# Patient Record
Sex: FEMALE | Race: WHITE | NOT HISPANIC OR LATINO | Employment: FULL TIME | URBAN - METROPOLITAN AREA
[De-identification: names, ages, dates, MRNs, and addresses within clinical notes are randomized per-mention and may not be internally consistent; named-entity substitution may affect disease eponyms.]

---

## 2017-01-12 ENCOUNTER — COMMUNICATION - HEALTHEAST (OUTPATIENT)
Dept: INTERNAL MEDICINE | Facility: CLINIC | Age: 52
End: 2017-01-12

## 2017-01-17 ENCOUNTER — COMMUNICATION - HEALTHEAST (OUTPATIENT)
Dept: INTERNAL MEDICINE | Facility: CLINIC | Age: 52
End: 2017-01-17

## 2017-01-24 ENCOUNTER — OFFICE VISIT - HEALTHEAST (OUTPATIENT)
Dept: PODIATRY | Age: 52
End: 2017-01-24

## 2017-01-24 DIAGNOSIS — L57.0 KERATOMA: ICD-10-CM

## 2017-01-25 ENCOUNTER — COMMUNICATION - HEALTHEAST (OUTPATIENT)
Dept: INTERNAL MEDICINE | Facility: CLINIC | Age: 52
End: 2017-01-25

## 2017-01-25 DIAGNOSIS — Z13.9 SCREENING: ICD-10-CM

## 2017-01-26 ENCOUNTER — COMMUNICATION - HEALTHEAST (OUTPATIENT)
Dept: INTERNAL MEDICINE | Facility: CLINIC | Age: 52
End: 2017-01-26

## 2017-01-27 ENCOUNTER — RECORDS - HEALTHEAST (OUTPATIENT)
Dept: ADMINISTRATIVE | Facility: OTHER | Age: 52
End: 2017-01-27

## 2017-02-08 ENCOUNTER — OFFICE VISIT - HEALTHEAST (OUTPATIENT)
Dept: INTERNAL MEDICINE | Facility: CLINIC | Age: 52
End: 2017-02-08

## 2017-02-08 ENCOUNTER — COMMUNICATION - HEALTHEAST (OUTPATIENT)
Dept: INTERNAL MEDICINE | Facility: CLINIC | Age: 52
End: 2017-02-08

## 2017-02-08 DIAGNOSIS — M54.89 OTHER CHRONIC BACK PAIN: ICD-10-CM

## 2017-02-08 DIAGNOSIS — Z23 NEED FOR IMMUNIZATION AGAINST INFLUENZA: ICD-10-CM

## 2017-02-08 DIAGNOSIS — L71.9 ROSACEA: ICD-10-CM

## 2017-02-08 DIAGNOSIS — Z91.09 ENVIRONMENTAL ALLERGIES: ICD-10-CM

## 2017-02-08 DIAGNOSIS — G89.29 OTHER CHRONIC BACK PAIN: ICD-10-CM

## 2017-02-08 DIAGNOSIS — G62.9 NEUROPATHY: ICD-10-CM

## 2017-02-08 DIAGNOSIS — K80.20 CALCULUS OF GALLBLADDER WITHOUT CHOLECYSTITIS WITHOUT OBSTRUCTION: ICD-10-CM

## 2017-02-08 ASSESSMENT — MIFFLIN-ST. JEOR: SCORE: 1361.53

## 2017-02-10 ENCOUNTER — OFFICE VISIT - HEALTHEAST (OUTPATIENT)
Dept: ALLERGY | Facility: CLINIC | Age: 52
End: 2017-02-10

## 2017-02-10 DIAGNOSIS — R20.0 NUMBNESS: ICD-10-CM

## 2017-02-10 DIAGNOSIS — T78.1XXD ADVERSE FOOD REACTION, SUBSEQUENT ENCOUNTER: ICD-10-CM

## 2017-02-10 DIAGNOSIS — L50.3 DERMATOGRAPHIC URTICARIA: ICD-10-CM

## 2017-02-10 ASSESSMENT — MIFFLIN-ST. JEOR: SCORE: 1344.52

## 2017-02-14 ENCOUNTER — COMMUNICATION - HEALTHEAST (OUTPATIENT)
Dept: INTERNAL MEDICINE | Facility: CLINIC | Age: 52
End: 2017-02-14

## 2017-02-14 ENCOUNTER — RECORDS - HEALTHEAST (OUTPATIENT)
Dept: ADMINISTRATIVE | Facility: OTHER | Age: 52
End: 2017-02-14

## 2017-02-14 ENCOUNTER — OFFICE VISIT (OUTPATIENT)
Dept: NEUROLOGY | Facility: CLINIC | Age: 52
End: 2017-02-14

## 2017-02-14 VITALS — SYSTOLIC BLOOD PRESSURE: 126 MMHG | DIASTOLIC BLOOD PRESSURE: 86 MMHG | HEART RATE: 66 BPM | RESPIRATION RATE: 18 BRPM

## 2017-02-14 DIAGNOSIS — R20.9 SENSORY DISTURBANCE: Primary | ICD-10-CM

## 2017-02-14 DIAGNOSIS — E53.8 VITAMIN B12 DEFICIENCY (NON ANEMIC): ICD-10-CM

## 2017-02-14 ASSESSMENT — PAIN SCALES - GENERAL: PAINLEVEL: MILD PAIN (2)

## 2017-02-14 NOTE — NURSING NOTE
Chief Complaint   Patient presents with     Consult     unexplained neuropathic process     Lanny Ivory,CMA

## 2017-02-14 NOTE — MR AVS SNAPSHOT
After Visit Summary   2/14/2017    Hill Odom    MRN: [id number]           Patient Information     Date Of Birth          1965        Visit Information        Provider Department      2/14/2017 1:30 PM Jace Henderson MD [facility]        Today's Diagnoses     Sensory disturbance    -  1    Vitamin B12 deficiency (non anemic)           Follow-ups after your visit        Additional Services     [facility] Referral       [facility]          Shaheed; ? Toxic reaction to insteps  [facility]  [address]  [address] [place], [place]  [id number] [phone]                  Your next 10 appointments already scheduled     Mar 14, 2017  8:15 AM CDT   Return Visit with Jace Henderson MD   [facility] ([facility])    [address], [address]  [place] [address] [phone]              Who to contact     Please call your clinic at [phone] to:    Ask questions about your health    Make or cancel appointments    Discuss your medicines    Learn about your test results    Speak to your doctor   If you have compliments or concerns about an experience at your clinic, or if you wish to file a complaint, please contact [facility] Patient Relations at [phone] or email us at [email or web address]         Additional Information About Your Visit        MyChart Information     [employer] gives you secure access to your electronic health record. If you see a primary care provider, you can also send messages to your care team and make appointments. If you have questions, please call your primary care clinic.  If you do not have a primary care provider, please call [phone] and they will assist you.      [employer] is an electronic gateway that provides easy, online access to your medical records.  With [employer], you can request a clinic appointment, read your test results, renew a prescription or communicate with your care team.     To access your existing account, please contact your [facility] or call [phone] for assistance.        Care EveryWhere ID     This is your Care EveryWhere ID. This could be used by other organizations to access your [place] medical records  [id number]        Your Vitals Were     Pulse Respirations                66 [phone]           Blood Pressure from Last 3 Encounters:   02/14/17 126/86    Weight from Last 3 Encounters:   No data found for Wt              We Performed the Following     Parietal Cell Antibody IgG        Primary Care Provider Office Phone # Fax #    Matt Sung [phone]       [facility] [address]  [place] [address]        Thank you!     Thank you for choosing [facility]  for your care. Our goal is always to provide you with excellent care. Hearing back from our patients is one way we can continue to improve our services. Please take a few minutes to complete the written survey that you may receive in the mail after your visit with us. Thank you!             Your Updated Medication List - Protect others around you: Learn how to safely use, store and throw away your medicines at www.disposemymeds.org.          This list is accurate as of: 2/14/17 11:59 PM.  Always use your most recent med list.                   Brand Name Dispense Instructions for use    SUMATRIPTAN SUCCINATE PO      Take 100 mg by mouth as needed for migraine

## 2017-02-14 NOTE — LETTER
2017       RE: Hill Odom   [address] [address]  [facility] [address]     Dear Colleague,    Thank you for referring your patient, Hill Odom, to the [facility] at [facility]. Please see a copy of my visit note below.    2017         Matt Sung MD   [facility]   [address]   [place]  [address]      RE: Hill Odom   MRN: [id number]   : 1965      Dear Matt:      Thank you for referring Hill Odom for neurologic consultation on 2017.  The patient is a 51-year-old woman you were kind enough to refer with chief complaints of a burning dysesthetic sensation.      The patient came with handwritten notes that I reviewed with her and I also took her history.  I was 30 minutes late and she was quite upset about that.  I apologized but I told her I really could not always keep my office schedule on time.  It suddenly turned out that she has a very complicated history and I ended up spending 70 minutes with her and then beyond that more minutes trying to arrange testing.  The patient did initially discuss with me diagnosis, and I did ask her to try to stay away from diagnosis and previous testing that was done until after I was able to get a history of her complaints.  She did tell me that she started to have problems in  or July.  She noted that she had a burning sensation involving her feet to the knees.  This evidently lasted about 10 days.  She also noted that she had extreme low back pain in July.  She can recall that she was reaching up to clear out some material in her garage and she evidently dropped a 20-pound box.  She said about 2 weeks later symptoms started.  She just recalled this historical event.  She also then noted a bump involving the right side of her head.  This lasted for about 6 weeks.  She said that she had a very small area that was not  discolored that actually broke and bled.  She could recall feeling this initially start when she was on a bike ride.  The patient did note that she had paresthesias that have continued in her feet.  These have been more persistent in a seated position.  She said the last 10 days she has actually had improvement in her feet as she took out memory foam that she had placed in her shoes and she is convinced that she had some type of toxic reaction to the memory foam.  She noted symptoms also were present when she would be seated on the floor with her knees flexed.  The patient has noted burning involving the back during this time at least 1-2 times per month and also to a lesser extent in her abdomen.  The patient did note that all of her sensory complaints have had a burning quality.  She has actually noted that if she sits and moves her head forward she does get what she described as the same sensation in her lower coccyx area.  She is strong.  She still has paresthesias involving the soles of her feet that go to the back of her calves.  The patient did note that she had pain involving the middle of her thoracic spine in July.  She said that she had also lower back pain.  She has never had decreased perirectal or genital sensation.  She did note that earlier she was quite fatigued until the last 2 weeks and that seems to have improved.  The patient did note that she has had urgency of bowel movement for years.  She said that she has actually gained about 10 pounds in the last 10 months.  She has never had any problems with imbalance.  She said her arms are free of symptoms.  She noted that she has been in excellent health.  Earlier she would exercise riding her bike.  When I reviewed some of her records in front of her I did note that she had been evaluated for a dysesthetic sensation in the left shoulder that lasted about 2 months in 2015.  She said that she had been carrying material over her shoulder but then  "switched to a backpack and that seemed to help.  Those symptoms never returned.  She also complained of some tingling in her toes.  She said in July she did receive prednisone for a week and also Medrol at suppertime.  This helped for about a week.  The patient went on to tell me there is no family history of demyelinating disease.  She herself has never had any history of optic neuritis.  Her symptoms do not seem to be heat related.  She is of [place]  ext[place].  She does not smoke.  She has never used illicit drugs.  She rarely has used alcohol.  The patient did review her family history.  She never really knew her father.  Her mother is fine.  The patient's sister has medical problems and is on disability.  She has 2 brothers and she is estranged from them.  They evidently had  drug and alcohol issues.  The patient denied any trouble with memory.  She has not had any history of allergies to medication.      Review of her typed-written notes indicated that the patient's symptoms started on 06/24/2016.  At that time, she felt something burst in her right shoulder blade.  She thought she had a \"gallbladder stone.\"  Later she had some bad diarrhea.  She had cleaned out the garage and dropped a heavy box.  She said she had no immediate symptoms as a result.  Then on 07/08 she noted paresthesias when she took a walk.  She had severe back pain and numbness when seated in a car on the way to [place].  Biking helped.  A pillow between her knees helped.  Then she had severe diarrhea.  On 07/18, the patient noted a burning sensation in her legs.  She said she had what she described as hot around the knees, it \"felt like fluid behind the knees.\"  The patient on 07/22 was given prednisone.  In August, she noticed the bug bite she described on the side of her head.  In September, she took meloxicam for 2 weeks and then had a burning sensation in her scalp.  The patient did take vitamin D and vitamin B12 in early in " "September.  In mid September, she had attacks on her stomach and back.  It started burning in the upper stomach and the back in the middle of the night.  On 10/15, she had severe diarrhea with bright green bowel movement.  In October, she had paresthesias from her knees to her feet, getting chills.  She had a pain level of 2-3 for 3-4 days with spikes of the pain to 10 for 2-3 days.  She had numbness in her legs when lying down but exacerbated when walking.  She said after 1-1/2 blocks she could hardly feel her feet.  The patient did have on 11/28 on the morning of endoscopy \"electric shocks\" to her lower back and lower abdomen area starting about every 30 seconds.  The patient subsequently did have a neurologic consultation and also had endocrinology, [facility] review, as well as gastroenterology consult and did have an endoscopy.  The patient did have multiple tests done.  She had what appears on records from 10/04/2016 from a neurologist a prior MRI scan of the lumbar spine that showed at L4-5 a small disk protrusion on the right, extrapyramidal zone with annular fissure.  This encroached on the right L4 nerve root.      The patient did have lab studies collated on that record from 09/28/2016, and these were normal for complete blood count, gliadin antibody studies, as well as tissue transglutaminase studies, TSH and IgA level.  Her vitamin D level was low at 28.3.      The patient did evidently have a CT angiogram of the head and neck which showed no mass or abnormality.  Abdominal ultrasound on 06/20/2015 showed she had a solitary mobile gallstones and no evidence of cholecystitis.      The patient's other records do indicate that she has had a cholelithiasis, history of anesthesia with \"slow to wake upon general anesthesia.\"  This evidently came after a partial hysterectomy was done for fibroids on 06/2012.  She has had a prior history of migraine headaches, postoperative nausea and vomiting, rosacea, " "eyelid surgery 12/2015 in 2 foot surgeries.      The other neurologist's records do indicate that she had epigastric pain that was worse at night.  This was from September to December.  She stopped milk then and her symptoms improved.      The patient had a prior history of migraines but not for 10 years and these are better.      The patient also saw a different neurologist, Dr. Linsey Diaz.  She did not recall that evaluation other than she said she was told by Dr. Diaz \"she did not do EMGs.\"  The patient did have a history of migraines the previous 10 years.  These are much better.      Other records from the [facility] did indicate that she had normal EMG study of the legs.  This study was evidently done in December after she had seen Dr. Whalen at the [facility].  He indicated when she was seen on 12/03/2015 that she had a tingling in her left shoulder on an intermittent basis for the last 3 months, and these episodes occurred about every other day for about 5 minutes.  She also reported tingling in her hands, feet and tongue the last couple of weeks.  He did recommend an MRI scan that was done of her brain and cervical cord for demyelinating lesions and this evidently was normal according to the patient.      The patient did have other tests done and these included an  LP. On 01/18/2016  results showed a negative IgG index, negative, HSV, negative cytology, negative Lyme CSF, equivocal oligoclonal band, protein of 32 and glucose of 48 and 2 white blood cells noted on CSF.      Neurologic examination revealed an anxious woman who initially was quite upset about my being late by 30 minutes.  Her blood pressure is 126/86 with a pulse of 66.  Gait, station, cerebellar testing, muscle stretch reflexes that were present at the biceps and brachioradialis, brisk at the triceps with knee jerks being slightly brisk and present with slightly reduced ankle jerks, left upper quadrant abdominal reflexes being present, " plantar stimulation with withdrawal, strength testing, cranial nerve examination, superficial and cortical sensory testing with no obvious sensory level being found.  She had normal vibratory and position sense testing.  She had negative Beevor sign.  I could not auscultate cervical bruits.  She had a supple neck.  The patient had regular cardiac rhythm without gallops or murmurs.      IMPRESSION:  Sensory disturbance.      The patient is convinced that much of her problem does relate to wearing insteps which could have some type of toxic effect on her.  I did tell her I did not know a  but did ask that she review the possibility of consultation with a  in [place] who also works at [facility].  I directed her to that office[[facility]].  The patient is going to have further studies now including MRI scan of the cervical and thoracic spine with and without gadolinium at [facility] on a 3 Cinthia machine.  Depending on the test results, I will make other recommendations.  She notes she is better over the last 10 days.  I told her I did not have a specific diagnosis but was concerned that her symptoms in her lower back were made worse by neck flexion and explained the significance of this.      A complete review of medical systems was done and a positive review is listed in the report above. Over 50% of the time involved counseling and coordination of care.     Sincerely yours,      Jace Henderson MD           D: 2017 15:22   T: 2017 09:43   MT: [email or web address]      Name:     SUZAN KENT   MRN:      [id number]        Account:      [id number]   :      1965           Service Date: 2017      Document: [id number]

## 2017-02-17 ENCOUNTER — OFFICE VISIT - HEALTHEAST (OUTPATIENT)
Dept: SURGERY | Facility: CLINIC | Age: 52
End: 2017-02-17

## 2017-02-17 DIAGNOSIS — K80.20 SYMPTOMATIC CHOLELITHIASIS: ICD-10-CM

## 2017-02-17 ASSESSMENT — MIFFLIN-ST. JEOR: SCORE: 1345.65

## 2017-02-20 DIAGNOSIS — R20.9 SENSORY DISTURBANCE: ICD-10-CM

## 2017-02-20 DIAGNOSIS — E53.8 VITAMIN B12 DEFICIENCY (NON ANEMIC): ICD-10-CM

## 2017-02-21 NOTE — PROGRESS NOTES
2017         Matt Sung MD   [facility]   [address]   [place]  [address]      RE: Hill Odom   MRN: [id number]   : 1965      Dear Matt:      Thank you for referring Hill Odom for neurologic consultation on 2017.  The patient is a 51-year-old woman you were kind enough to refer with chief complaints of a burning dysesthetic sensation.      The patient came with handwritten notes that I reviewed with her and I also took her history.  I was 30 minutes late and she was quite upset about that.  I apologized but I told her I really could not always keep my office schedule on time.  It suddenly turned out that she has a very complicated history and I ended up spending 70 minutes with her and then beyond that more minutes trying to arrange testing.  The patient did initially discuss with me diagnosis, and I did ask her to try to stay away from diagnosis and previous testing that was done until after I was able to get a history of her complaints.  She did tell me that she started to have problems in  or July.  She noted that she had a burning sensation involving her feet to the knees.  This evidently lasted about 10 days.  She also noted that she had extreme low back pain in July.  She can recall that she was reaching up to clear out some material in her garage and she evidently dropped a 20-pound box.  She said about 2 weeks later symptoms started.  She just recalled this historical event.  She also then noted a bump involving the right side of her head.  This lasted for about 6 weeks.  She said that she had a very small area that was not discolored that actually broke and bled.  She could recall feeling this initially start when she was on a bike ride.  The patient did note that she had paresthesias that have continued in her feet.  These have been more persistent in a seated position.  She said the last 10 days she has actually had  improvement in her feet as she took out memory foam that she had placed in her shoes and she is convinced that she had some type of toxic reaction to the memory foam.  She noted symptoms also were present when she would be seated on the floor with her knees flexed.  The patient has noted burning involving the back during this time at least 1-2 times per month and also to a lesser extent in her abdomen.  The patient did note that all of her sensory complaints have had a burning quality.  She has actually noted that if she sits and moves her head forward she does get what she described as the same sensation in her lower coccyx area.  She is strong.  She still has paresthesias involving the soles of her feet that go to the back of her calves.  The patient did note that she had pain involving the middle of her thoracic spine in July.  She said that she had also lower back pain.  She has never had decreased perirectal or genital sensation.  She did note that earlier she was quite fatigued until the last 2 weeks and that seems to have improved.  The patient did note that she has had urgency of bowel movement for years.  She said that she has actually gained about 10 pounds in the last 10 months.  She has never had any problems with imbalance.  She said her arms are free of symptoms.  She noted that she has been in excellent health.  Earlier she would exercise riding her bike.  When I reviewed some of her records in front of her I did note that she had been evaluated for a dysesthetic sensation in the left shoulder that lasted about 2 months in 2015.  She said that she had been carrying material over her shoulder but then switched to a backpack and that seemed to help.  Those symptoms never returned.  She also complained of some tingling in her toes.  She said in July she did receive prednisone for a week and also Medrol at suppertime.  This helped for about a week.  The patient went on to tell me there is no family  "history of demyelinating disease.  She herself has never had any history of optic neuritis.  Her symptoms do not seem to be heat related.  She is of northern  ext[place].  She does not smoke.  She has never used illicit drugs.  She rarely has used alcohol.  The patient did review her family history.  She never really knew her father.  Her mother is fine.  The patient's sister has medical problems and is on disability.  She has 2 brothers and she is estranged from them.  They evidently had  drug and alcohol issues.  The patient denied any trouble with memory.  She has not had any history of allergies to medication.      Review of her typed-written notes indicated that the patient's symptoms started on 06/24/2016.  At that time, she felt something burst in her right shoulder blade.  She thought she had a \"gallbladder stone.\"  Later she had some bad diarrhea.  She had cleaned out the garage and dropped a heavy box.  She said she had no immediate symptoms as a result.  Then on 07/08 she noted paresthesias when she took a walk.  She had severe back pain and numbness when seated in a car on the way to [place].  Biking helped.  A pillow between her knees helped.  Then she had severe diarrhea.  On 07/18, the patient noted a burning sensation in her legs.  She said she had what she described as hot around the knees, it \"felt like fluid behind the knees.\"  The patient on 07/22 was given prednisone.  In August, she noticed the bug bite she described on the side of her head.  In September, she took meloxicam for 2 weeks and then had a burning sensation in her scalp.  The patient did take vitamin D and vitamin B12 in early in September.  In mid September, she had attacks on her stomach and back.  It started burning in the upper stomach and the back in the middle of the night.  On 10/15, she had severe diarrhea with bright green bowel movement.  In October, she had paresthesias from her knees to her feet, getting chills. " " She had a pain level of 2-3 for 3-4 days with spikes of the pain to 10 for 2-3 days.  She had numbness in her legs when lying down but exacerbated when walking.  She said after 1-1/2 blocks she could hardly feel her feet.  The patient did have on 11/28 on the morning of endoscopy \"electric shocks\" to her lower back and lower abdomen area starting about every 30 seconds.  The patient subsequently did have a neurologic consultation and also had endocrinology, [facility] review, as well as gastroenterology consult and did have an endoscopy.  The patient did have multiple tests done.  She had what appears on records from 10/04/2016 from a neurologist a prior MRI scan of the lumbar spine that showed at L4-5 a small disk protrusion on the right, extrapyramidal zone with annular fissure.  This encroached on the right L4 nerve root.      The patient did have lab studies collated on that record from 09/28/2016, and these were normal for complete blood count, gliadin antibody studies, as well as tissue transglutaminase studies, TSH and IgA level.  Her vitamin D level was low at 28.3.      The patient did evidently have a CT angiogram of the head and neck which showed no mass or abnormality.  Abdominal ultrasound on 06/20/2015 showed she had a solitary mobile gallstones and no evidence of cholecystitis.      The patient's other records do indicate that she has had a cholelithiasis, history of anesthesia with \"slow to wake upon general anesthesia.\"  This evidently came after a partial hysterectomy was done for fibroids on 06/2012.  She has had a prior history of migraine headaches, postoperative nausea and vomiting, rosacea, eyelid surgery 12/2015 in 2 foot surgeries.      The other neurologist's records do indicate that she had epigastric pain that was worse at night.  This was from September to December.  She stopped milk then and her symptoms improved.      The patient had a prior history of migraines but not for 10 years " "and these are better.      The patient also saw a different neurologist, Dr. Linsey Diaz.  She did not recall that evaluation other than she said she was told by Dr. Diaz \"she did not do EMGs.\"  The patient did have a history of migraines the previous 10 years.  These are much better.      Other records from the [facility] did indicate that she had normal EMG study of the legs.  This study was evidently done in December after she had seen Dr. Whalen at the [facility].  He indicated when she was seen on 12/03/2015 that she had a tingling in her left shoulder on an intermittent basis for the last 3 months, and these episodes occurred about every other day for about 5 minutes.  She also reported tingling in her hands, feet and tongue the last couple of weeks.  He did recommend an MRI scan that was done of her brain and cervical cord for demyelinating lesions and this evidently was normal according to the patient.      The patient did have other tests done and these included an  LP. On 01/18/2016  results showed a negative IgG index, negative, HSV, negative cytology, negative Lyme CSF, equivocal oligoclonal band, protein of 32 and glucose of 48 and 2 white blood cells noted on CSF.      Neurologic examination revealed an anxious woman who initially was quite upset about my being late by 30 minutes.  Her blood pressure is 126/86 with a pulse of 66.  Gait, station, cerebellar testing, muscle stretch reflexes that were present at the biceps and brachioradialis, brisk at the triceps with knee jerks being slightly brisk and present with slightly reduced ankle jerks, left upper quadrant abdominal reflexes being present, plantar stimulation with withdrawal, strength testing, cranial nerve examination, superficial and cortical sensory testing with no obvious sensory level being found.  She had normal vibratory and position sense testing.  She had negative Beevor sign.  I could not auscultate cervical bruits.  She had a supple " neck.  The patient had regular cardiac rhythm without gallops or murmurs.      IMPRESSION:  Sensory disturbance.      The patient is convinced that much of her problem does relate to wearing insteps which could have some type of toxic effect on her.  I did tell her I did not know a  but did ask that she review the possibility of consultation with a  in [place] who also works at [facility].  I directed her to that office[[facility]].  The patient is going to have further studies now including MRI scan of the cervical and thoracic spine with and without gadolinium at [facility] on a 3 Cinthia machine.  Depending on the test results, I will make other recommendations.  She notes she is better over the last 10 days.  I told her I did not have a specific diagnosis but was concerned that her symptoms in her lower back were made worse by neck flexion and explained the significance of this.      A complete review of medical systems was done and a positive review is listed in the report above. Over 50% of the time involved counseling and coordination of care.     Sincerely yours,      MD LYNNE Schwab MD             D: 2017 15:22   T: 2017 09:43   MT: [email or web address]      Name:     SUZAN KENT   MRN:      [id number]        Account:      [id number]   :      1965           Service Date: 2017      Document: [id number]

## 2017-02-23 ENCOUNTER — TELEPHONE (OUTPATIENT)
Dept: NEUROLOGY | Facility: CLINIC | Age: 52
End: 2017-02-23

## 2017-02-23 DIAGNOSIS — R20.9 SENSORY DISTURBANCE: Primary | ICD-10-CM

## 2017-02-23 LAB
COPPER SERPL-MCNC: 109 UG/DL
ZINC SERPL-MCNC: 60 UG/ML

## 2017-02-27 LAB — PNP ABY SERUM: NORMAL

## 2017-02-28 ENCOUNTER — TELEPHONE (OUTPATIENT)
Dept: NEUROLOGY | Facility: CLINIC | Age: 52
End: 2017-02-28

## 2017-02-28 NOTE — TELEPHONE ENCOUNTER
----- Message from Jace Henderson MD sent at 2/28/2017 12:44 PM CST -----  Please tell her labs I ordered are negative or normal[good news]

## 2017-03-02 ENCOUNTER — ALLIED HEALTH/NURSE VISIT (OUTPATIENT)
Dept: NEUROLOGY | Facility: CLINIC | Age: 52
End: 2017-03-02

## 2017-03-02 DIAGNOSIS — M47.812 CERVICAL SPONDYLOSIS WITHOUT MYELOPATHY: ICD-10-CM

## 2017-03-02 DIAGNOSIS — R20.2 PARESTHESIA OF BOTH LOWER EXTREMITIES: Primary | ICD-10-CM

## 2017-03-02 DIAGNOSIS — E53.8 VITAMIN B12 DEFICIENCY (NON ANEMIC): ICD-10-CM

## 2017-03-02 DIAGNOSIS — R20.9 SENSORY DISTURBANCE: ICD-10-CM

## 2017-03-02 NOTE — MR AVS SNAPSHOT
After Visit Summary   3/2/2017    Hill Odom    MRN: [id number]           Patient Information     Date Of Birth          1965        Visit Information        Provider Department      3/2/2017 10:30 AM  EEG TECH 1 [facility]        Today's Diagnoses     Paresthesia of both lower extremities    -  1    Cervical spondylosis without myelopathy        Vitamin B12 deficiency (non anemic)           Follow-ups after your visit        Your next 10 appointments already scheduled     Mar 02, 2017  6:15 PM CST   (Arrive by 6:00 PM)   MR BRAIN W/O & W CONTRAST with [id number]   [facility] MRI ([facility])    [address] [address] [phone]           Take your medicines as usual, unless your doctor tells you not to. Bring a list of your current medicines to your exam (including vitamins, minerals and over-the-counter drugs).  You will be given intravenous contrast for this exam. To prepare:   The day before your exam, drink extra fluids at least six 8-ounce glasses (unless your doctor tells you to restrict your fluids).   Have a blood test (creatinine test) within 30 days of your exam. Go to your clinic or Diagnostic Imaging Department for this test.  The MRI machine uses a strong magnet. Please wear clothes without metal (snaps, zippers). A sweatsuit works well, or we may give you a hospital gown.  Please remove any body piercings and hair extensions before you arrive. You will also remove watches, jewelry, hairpins, wallets, dentures, partial dental plates and hearing aids. You may wear contact lenses, and you may be able to wear your rings. We have a safe place to keep your personal items, but it is safer to leave them at home.   **IMPORTANT** THE INSTRUCTIONS BELOW ARE ONLY FOR THOSE PATIENTS WHO HAVE BEEN TOLD THEY WILL RECEIVE SEDATION OR GENERAL ANESTHESIA DURING THEIR MRI PROCEDURE:  IF YOU WILL RECEIVE SEDATION  (take medicine to help you relax during your exam):   You must get the medicine from your doctor before you arrive. Bring the medicine to the exam. Do not take it at home.   Arrive one hour early. Bring someone who can take you home after the test. Your medicine will make you sleepy. After the exam, you may not drive, take a bus or take a taxi by yourself.   No eating 8 hours before your exam. You may have clear liquids up until 4 hours before your exam. (Clear liquids include water, clear tea, black coffee and fruit juice without pulp.)  IF YOU WILL RECEIVE ANESTHESIA (be asleep for your exam):   Arrive 1 1/2 hours early. Bring someone who can take you home after the test. You may not drive, take a bus or take a taxi by yourself.   No eating 8 hours before your exam. You may have clear liquids up until 4 hours before your exam. (Clear liquids include water, clear tea, black coffee and fruit juice without pulp.)  Please call the Imaging Department at your exam site with any questions.            Mar 14, 2017  8:15 AM CDT   Return Visit with Jace Henderson MD   [facility] ([facility])    [address]  [facility] [place] [address] [phone]              Who to contact     Please call your clinic at [phone] to:    Ask questions about your health    Make or cancel appointments    Discuss your medicines    Learn about your test results    Speak to your doctor   If you have compliments or concerns about an experience at your clinic, or if you wish to file a complaint, please contact [facility] Patient Relations at [phone] or email us at [email or web address]         Additional Information About Your Visit        MyChart Information     [employer] gives you secure access to your electronic health record. If you see a primary care provider, you can also send messages to your care team and make  appointments. If you have questions, please call your primary care clinic.  If you do not have a primary care provider, please call [phone] and they will assist you.      [employer] is an electronic gateway that provides easy, online access to your medical records. With [employer], you can request a clinic appointment, read your test results, renew a prescription or communicate with your care team.     To access your existing account, please contact your [facility] or call [phone] for assistance.        Care EveryWhere ID     This is your Care EveryWhere ID. This could be used by other organizations to access your [place] medical records  [id number]         Blood Pressure from Last 3 Encounters:   02/14/17 126/86    Weight from Last 3 Encounters:   No data found for Wt              We Performed the Following     SS EVOKED POTENTIAL, LOWER LIMBS PRT (40467)     SS EVOKED POTENTIAL, UPPER LIMBS PRT ([id number])        Primary Care Provider Office Phone # Fax #    Matt Sung [phone]       [facility] [address]  [place] [address]        Thank you!     Thank you for choosing [facility]  for your care. Our goal is always to provide you with excellent care. Hearing back from our patients is one way we can continue to improve our services. Please take a few minutes to complete the written survey that you may receive in the mail after your visit with us. Thank you!             Your Updated Medication List - Protect others around you: Learn how to safely use, store and throw away your medicines at www.disposemymeds.org.          This list is accurate as of: 3/2/17 10:49 AM.  Always use your most recent med list.                   Brand Name Dispense Instructions for use    SUMATRIPTAN SUCCINATE PO      Take 100 mg by mouth as needed for migraine

## 2017-03-14 ENCOUNTER — OFFICE VISIT (OUTPATIENT)
Dept: NEUROLOGY | Facility: CLINIC | Age: 52
End: 2017-03-14

## 2017-03-14 ENCOUNTER — RECORDS - HEALTHEAST (OUTPATIENT)
Dept: ADMINISTRATIVE | Facility: OTHER | Age: 52
End: 2017-03-14

## 2017-03-14 VITALS — SYSTOLIC BLOOD PRESSURE: 125 MMHG | RESPIRATION RATE: 18 BRPM | DIASTOLIC BLOOD PRESSURE: 92 MMHG | HEART RATE: 76 BPM

## 2017-03-14 DIAGNOSIS — R20.9 SENSORY DISTURBANCE: Primary | ICD-10-CM

## 2017-03-14 DIAGNOSIS — R94.131 ABNORMAL EMG: ICD-10-CM

## 2017-03-14 RX ORDER — GABAPENTIN 100 MG/1
CAPSULE ORAL
Qty: 90 CAPSULE | Refills: 3 | Status: SHIPPED | OUTPATIENT
Start: 2017-03-14 | End: 2017-05-31 | Stop reason: SINTOL

## 2017-03-14 NOTE — NURSING NOTE
Chief Complaint   Patient presents with     RECHECK     f/u back pain and numbness in feet      Lanny Ivory,KARMEN

## 2017-03-14 NOTE — MR AVS SNAPSHOT
After Visit Summary   3/14/2017    Hill Odom    MRN: [id number]           Patient Information     Date Of Birth          1965        Visit Information        Provider Department      3/14/2017 8:15 AM Jace Henderson MD [facility]        Today's Diagnoses     Sensory disturbance    -  1    Abnormal EMG           Follow-ups after your visit        Your next 10 appointments already scheduled     Apr 04, 2017  8:00 AM CDT   EMG with Eb Calderon MD   [facility] ([facility])    [address]  [facility] [facility] [address] [phone]              Who to contact     Please call your clinic at [phone] to:    Ask questions about your health    Make or cancel appointments    Discuss your medicines    Learn about your test results    Speak to your doctor   If you have compliments or concerns about an experience at your clinic, or if you wish to file a complaint, please contact [facility] Patient Relations at [phone] or email us at [email or web address]         Additional Information About Your Visit        MyChart Information     [employer] gives you secure access to your electronic health record. If you see a primary care provider, you can also send messages to your care team and make appointments. If you have questions, please call your primary care clinic.  If you do not have a primary care provider, please call [phone] and they will assist you.      [employer] is an electronic gateway that provides easy, online access to your medical records. With [employer], you can request a clinic appointment, read your test results, renew a prescription or communicate with your care team.     To access your existing account, please contact your [facility] or call [phone] for assistance.        Care  EveryWhere ID     This is your Care EveryWhere ID. This could be used by other organizations to access your [place] medical records  [id number]        Your Vitals Were     Pulse Respirations                76 18           Blood Pressure from Last 3 Encounters:   03/14/17 (!) 125/92   02/14/17 126/86    Weight from Last 3 Encounters:   No data found for Wt              We Performed the Following     Needle EMG - 1 Extremity  (5 or more muscles w/ 3or more nerves or 4 or more spinal levels) (38550)          Today's Medication Changes          These changes are accurate as of: 3/14/17  9:06 AM.  If you have any questions, ask your nurse or doctor.               Start taking these medicines.        Dose/Directions    gabapentin 100 MG capsule   Commonly known as:  NEURONTIN   Used for:  Sensory disturbance        Take 100mg po for 3 days, then 200mg po for 3 days, then 300mg   Quantity:  90 capsule   Refills:  3            Where to get your medicines      These medications were sent to [facility]/pharmacy #[id number] [place], [place] - [address]  [address] [address]     Phone:  [phone]     gabapentin 100 MG capsule                Primary Care Provider Office Phone # Fax #    Matt Sung [phone]       [facility] [address]  [place] [address]        Thank you!     Thank you for choosing [facility]  for your care. Our goal is always to provide you with excellent care. Hearing back from our patients is one way we can continue to improve our services. Please take a few minutes to complete the written survey that you may receive in the mail after your visit with us. Thank you!             Your Updated Medication List - Protect others around you: Learn how to safely use, store and throw away your medicines at www.disposemymeds.org.          This list is accurate as of: 3/14/17  9:06 AM.  Always use your most recent med  list.                   Brand Name Dispense Instructions for use    gabapentin 100 MG capsule    NEURONTIN    90 capsule    Take 100mg po for 3 days, then 200mg po for 3 days, then 300mg       SUMATRIPTAN SUCCINATE PO      Take 100 mg by mouth as needed for migraine

## 2017-03-14 NOTE — LETTER
"3/14/2017       RE: Hill Odom   [address]  [facility] [address]     Dear Colleague,    Thank you for referring your patient, Hill Odom, to the [facility] NEUROLOGY CLINIC at [facility]. Please see a copy of my visit note below.    2017      Matt Sung MD   [facility]   [address], [address]   [place]  [address]      RE: Hill Odom   MRN: [id number]   : 1965      Dear Matt:      This is in regard to followup on Hill Odom.  The patient returned today with chief complaints of \"electric shocks\" throughout her body as well as muscle pain and possible Lhermitte sign.      The patient did undergo SEP.  These somatoform evoked potentials were done of the left tibial and left median motor nerve at [facility].  Central conduction was normal for both studies, which is reassuring.  She has slowing or prolongation of the left tibial pathway until it went into the central pathway.  This was felt to be a peripheral process.  I did go over with the patient her various previous evaluations and did suggest that she have an EMG now done through Dr. Calderon, our neuromuscular expert.  She did talk with me about her neck being sore and having tongue type of issues with soreness.  She has never had any trouble taking low-dose gabapentin.  I have talked with her about going to a higher dose today again.  She has not had any history of bipolar disease.  She denied any depression, suicidal thoughts, previous history of sedation or other problems.  I went over potential side effects of gabapentin with her and did prescribe it.  Specifically, I did warn her about inability to drive and/or trouble driving or operating heavy machinery as well as generalized weakness. I reviewed all of the MRI scans done of her neuro axis and reviewed the films with her and also her labs. I discussed with the patient further followup " including EMG testing of her lower extremities.  She is going to have further evaluation and testing based on this testing.      Thank you for allowing me to see this patient.  If you should have any questions, please feel free to contact me. I spent 25 min with the patient today and 50% of the time it involved counseling and coordination of care.      Sincerely yours,       She is going to have followup with me shortly.             Jace Henderson MD           D: 2017 16:44   T: 03/15/2017 13:54   MT: [email or web address]      Name:     SUZAN KENT   MRN:      [id number]        Account:      [id number]   :      1965           Service Date: 2017      Document: [id number]

## 2017-03-15 ENCOUNTER — COMMUNICATION - HEALTHEAST (OUTPATIENT)
Dept: INTERNAL MEDICINE | Facility: CLINIC | Age: 52
End: 2017-03-15

## 2017-03-15 NOTE — PROGRESS NOTES
"2017      Matt Sung MD   [facility]   [address], [address]   [place]  [address]      RE: Hill Odom   MRN: [id number]   : 1965      Dear Matt:      This is in regard to followup on Hill Odom.  The patient returned today with chief complaints of \"electric shocks\" throughout her body as well as muscle pain and possible Lhermitte sign.      The patient did undergo SEP.  These somatoform evoked potentials were done of the left tibial and left median motor nerve at [facility].  Central conduction was normal for both studies, which is reassuring.  She has slowing or prolongation of the left tibial pathway until it went into the central pathway.  This was felt to be a peripheral process.  I did go over with the patient her various previous evaluations and did suggest that she have an EMG now done through Dr. Calderon, our neuromuscular expert.  She did talk with me about her neck being sore and having tongue type of issues with soreness.  She has never had any trouble taking low-dose gabapentin.  I have talked with her about going to a higher dose today again.  She has not had any history of bipolar disease.  She denied any depression, suicidal thoughts, previous history of sedation or other problems.  I went over potential side effects of gabapentin with her and did prescribe it.  Specifically, I did warn her about inability to drive and/or trouble driving or operating heavy machinery as well as generalized weakness. I reviewed all of the MRI scans done of her neuro axis and reviewed the films with her and also her labs. I discussed with the patient further followup including EMG testing of her lower extremities.  She is going to have further evaluation and testing based on this testing.      Thank you for allowing me to see this patient.  If you should have any questions, please feel free to contact me. I spent 25 min with the patient today and 50% of the time it involved " counseling and coordination of care.      Sincerely yours,       She is going to have followup with me shortly.             MD LYNNE Schwab MD             D: 2017 16:44   T: 03/15/2017 13:54   MT: [email or web address]      Name:     SUZAN KENT   MRN:      [id number]        Account:      [id number]   :      1965           Service Date: 2017      Document: [id number]

## 2017-04-12 ENCOUNTER — RECORDS - HEALTHEAST (OUTPATIENT)
Dept: ADMINISTRATIVE | Facility: OTHER | Age: 52
End: 2017-04-12

## 2017-04-12 ENCOUNTER — OFFICE VISIT (OUTPATIENT)
Dept: NEUROLOGY | Facility: CLINIC | Age: 52
End: 2017-04-12

## 2017-04-12 DIAGNOSIS — M54.50 CHRONIC BILATERAL LOW BACK PAIN WITHOUT SCIATICA: ICD-10-CM

## 2017-04-12 DIAGNOSIS — G89.29 CHRONIC BILATERAL LOW BACK PAIN WITHOUT SCIATICA: ICD-10-CM

## 2017-04-12 DIAGNOSIS — R20.0 BILATERAL LEG NUMBNESS: Primary | ICD-10-CM

## 2017-04-12 NOTE — LETTER
4/12/2017       RE: Hill Odom  [address]  [facility] [address]     Dear Colleague,    Thank you for referring your patient, Hill Odom, to the [facility] EMG at [facility]. Please see a copy of my visit note below.        [facility]  Electrodiagnostic Laboratory    Nerve Conduction & EMG Report          Patient:       Hill Odom  Patient ID:    [id number]  Gender:        Female  YOB: 1965  Age:           51 Years 7 Months        History & Examination:  51 year old woman with numbness from knees to toes. Both sides affected. Onset summer 2016. She also reports some low back discomfort. Eval for polyneuropathy vs radiuclopathy.     Techniques: Motor and sensory conduction studies were done with surface recording electrodes. EMG was done with a concentric needle electrode.     Results:  Nerve conduction studies:   1. Bilateral sural sensory responses were normal.   2. Bilateral peroneal-EDB and left tibial-AH motor responses were normal.     Needle EMG of selected right and left leg muscles was performed as tabulated below. No abnormal spontaneous activity was observed in the sampled muscles. Motor unit potential morphology and recruitment patterns were normal.     Interpretation:  This is a normal study. There is no electrophysiologic evidence of a large-fiber polyneuropathy or lumbosacral radiculopathy affecting the lower limbs on the basis of this study.     Eb Calderon MD  Department of Neurology            Sensory NCS      Nerve / Sites Rec. Site Onset Peak NP Amp Ref. PP Amp Dist Wyatt Ref. Temp     ms ms  V  V  V cm m/s m/s  C   L SURAL - Lat Mall      Calf Ankle 2.50 3.28 8.4 8.0 21.2 14 56.0 38.0 32.1   R SURAL - Lat Mall      Calf Ankle 2.29 3.18 8.7 8.0 7.1 14 61.1 38.0 31.2       Motor NCS      Nerve / Sites Rec. Site Lat Ref. Amp Ref. Rel Amp Dist Wyatt Ref. Dur. Area Temp.     ms ms mV mV % cm m/s m/s ms %  C   L DEEP PERONEAL - EDB       Ankle EDB 3.80 6.00 5.8 2.5 100 8   4.79 100 32      FibHead EDB 9.69  5.7  97.8 30 51.0 38.0 5.52 92 32      Pop Fos EDB 11.25  5.2  89.7 8.5 54.4 38.0 5.78 88.2 32   R DEEP PERONEAL - EDB      Ankle EDB 3.33 6.00 6.0 2.5 100 8   5.16 100 29.2   L TIBIAL - AH      Ankle AH 3.65 6.00 20.2 4.0 100 8   4.43 100 31.1      Pop Fos AH 10.94  9.3  46.2 39 53.5 38.0 5.16 55.7 31.5       F  Wave      Nerve Min F Lat Max F Lat Mean FLat Temp.    ms ms ms  C   L TIBIAL 46.56 47.81 47.13 29.4       EMG Summary Table     Normal    IA Fib Fasc H.F. Amp Dur. PPP Pattern   L. VAST LATERALIS Normal None None None N N None Normal   L. TIB ANTERIOR Normal None None None N N None Normal   L. GASTROCN (MED) Normal None None None N N None Normal   R. TIB ANTERIOR Normal None None None N N None Normal   R. GASTROCN (MED) Normal None None None N N None Normal                        Again, thank you for allowing me to participate in the care of your patient.      Sincerely,    Eb Calderon MD

## 2017-04-12 NOTE — PROGRESS NOTES
[facility]  Electrodiagnostic Laboratory    Nerve Conduction & EMG Report          Patient:       Hill Odom  Patient ID:    [id number]  Gender:        Female  YOB: 1965  Age:           51 Years 7 Months        History & Examination:  51 year old woman with numbness from knees to toes. Both sides affected. Onset summer 2016. She also reports some low back discomfort. Eval for polyneuropathy vs radiuclopathy.     Techniques: Motor and sensory conduction studies were done with surface recording electrodes. EMG was done with a concentric needle electrode.     Results:  Nerve conduction studies:   1. Bilateral sural sensory responses were normal.   2. Bilateral peroneal-EDB and left tibial-AH motor responses were normal.     Needle EMG of selected right and left leg muscles was performed as tabulated below. No abnormal spontaneous activity was observed in the sampled muscles. Motor unit potential morphology and recruitment patterns were normal.     Interpretation:  This is a normal study. There is no electrophysiologic evidence of a large-fiber polyneuropathy or lumbosacral radiculopathy affecting the lower limbs on the basis of this study.     Eb Calderon MD  Department of Neurology            Sensory NCS      Nerve / Sites Rec. Site Onset Peak NP Amp Ref. PP Amp Dist Wyatt Ref. Temp     ms ms  V  V  V cm m/s m/s  C   L SURAL - Lat Mall      Calf Ankle 2.50 3.28 8.4 8.0 21.2 14 56.0 38.0 32.1   R SURAL - Lat Mall      Calf Ankle 2.29 3.18 8.7 8.0 7.1 14 61.1 38.0 31.2       Motor NCS      Nerve / Sites Rec. Site Lat Ref. Amp Ref. Rel Amp Dist Wyatt Ref. Dur. Area Temp.     ms ms mV mV % cm m/s m/s ms %  C   L DEEP PERONEAL - EDB      Ankle EDB 3.80 6.00 5.8 2.5 100 8   4.79 100 32      FibHead EDB 9.69  5.7  97.8 30 51.0 38.0 5.52 92 32      Pop Fos EDB 11.25  5.2  89.7 8.5 54.4 38.0 5.78 88.2 32   R DEEP PERONEAL - EDB      Ankle EDB 3.33 6.00 6.0 2.5 100 8   5.16 100 29.2   L TIBIAL -  AH      Ankle AH 3.65 6.00 20.2 4.0 100 8   4.43 100 31.1      Pop Fos AH 10.94  9.3  46.2 39 53.5 38.0 5.16 55.7 31.5       F  Wave      Nerve Min F Lat Max F Lat Mean FLat Temp.    ms ms ms  C   L TIBIAL 46.56 47.81 47.13 29.4       EMG Summary Table     Normal    IA Fib Fasc H.F. Amp Dur. PPP Pattern   L. VAST LATERALIS Normal None None None N N None Normal   L. TIB ANTERIOR Normal None None None N N None Normal   L. GASTROCN (MED) Normal None None None N N None Normal   R. TIB ANTERIOR Normal None None None N N None Normal   R. GASTROCN (MED) Normal None None None N N None Normal

## 2017-04-12 NOTE — MR AVS SNAPSHOT
After Visit Summary   4/12/2017    Hill Odom    MRN: [id number]           Patient Information     Date Of Birth          1965        Visit Information        Provider Department      4/12/2017 12:30 PM Eb Calderon MD  Health EMG        Today's Diagnoses     Bilateral leg numbness    -  1    Chronic bilateral low back pain without sciatica           Follow-ups after your visit        Who to contact     Please call your clinic at [phone] to:    Ask questions about your health    Make or cancel appointments    Discuss your medicines    Learn about your test results    Speak to your doctor   If you have compliments or concerns about an experience at your clinic, or if you wish to file a complaint, please contact [facility] Patient Relations at [phone] or email us at [email or web address]         Additional Information About Your Visit        MyChart Information     [employer] gives you secure access to your electronic health record. If you see a primary care provider, you can also send messages to your care team and make appointments. If you have questions, please call your primary care clinic.  If you do not have a primary care provider, please call [phone] and they will assist you.      [employer] is an electronic gateway that provides easy, online access to your medical records. With [employer], you can request a clinic appointment, read your test results, renew a prescription or communicate with your care team.     To access your existing account, please contact your [facility] Clinic or call [phone] for assistance.        Care EveryWhere ID     This is your Care EveryWhere ID. This could be used by other organizations to access your [place] medical records  [id number]         Blood Pressure from Last 3 Encounters:   03/14/17 (!) 125/92   02/14/17 126/86    Weight from Last 3 Encounters:   No data found for Wt               We Performed the Following     HC NCS MOTOR W OR W/O F-WAVE, 5 OR 6     HC NEEDLE EMG EA EXTREMITY W/PARASPINAL AREA LIMITED        Primary Care Provider Office Phone # Fax #    Matt Sung [phone]       [facility] [address] [facility] [address]  [place] [address]        Thank you!     Thank you for choosing [facility]  for your care. Our goal is always to provide you with excellent care. Hearing back from our patients is one way we can continue to improve our services. Please take a few minutes to complete the written survey that you may receive in the mail after your visit with us. Thank you!             Your Updated Medication List - Protect others around you: Learn how to safely use, store and throw away your medicines at www.disposemymeds.org.          This list is accurate as of: 4/12/17  1:00 PM.  Always use your most recent med list.                   Brand Name Dispense Instructions for use    gabapentin 100 MG capsule    NEURONTIN    90 capsule    Take 100mg po for 3 days, then 200mg po for 3 days, then 300mg       SUMATRIPTAN SUCCINATE PO      Take 100 mg by mouth as needed for migraine

## 2017-04-19 ENCOUNTER — TELEPHONE (OUTPATIENT)
Dept: NEUROLOGY | Facility: CLINIC | Age: 52
End: 2017-04-19

## 2017-04-19 NOTE — TELEPHONE ENCOUNTER
Discussed normal EMG results.I reviewed her last MRI of lumbar spine. She didn't tolerate doxycline. For skin rash . Numbness better with low dosage gabapentin. Advised her to follow up here.

## 2017-04-24 ENCOUNTER — OFFICE VISIT - HEALTHEAST (OUTPATIENT)
Dept: SURGERY | Facility: CLINIC | Age: 52
End: 2017-04-24

## 2017-04-24 DIAGNOSIS — K80.20 SYMPTOMATIC CHOLELITHIASIS: ICD-10-CM

## 2017-04-24 ASSESSMENT — MIFFLIN-ST. JEOR: SCORE: 1343.84

## 2017-05-01 ENCOUNTER — AMBULATORY - HEALTHEAST (OUTPATIENT)
Dept: SURGERY | Facility: CLINIC | Age: 52
End: 2017-05-01

## 2017-05-01 ENCOUNTER — COMMUNICATION - HEALTHEAST (OUTPATIENT)
Dept: INTERNAL MEDICINE | Facility: CLINIC | Age: 52
End: 2017-05-01

## 2017-05-02 ENCOUNTER — RECORDS - HEALTHEAST (OUTPATIENT)
Dept: ADMINISTRATIVE | Facility: OTHER | Age: 52
End: 2017-05-02

## 2017-05-16 ENCOUNTER — OFFICE VISIT - HEALTHEAST (OUTPATIENT)
Dept: INTERNAL MEDICINE | Facility: CLINIC | Age: 52
End: 2017-05-16

## 2017-05-16 DIAGNOSIS — K80.20 CALCULUS OF GALLBLADDER WITHOUT CHOLECYSTITIS WITHOUT OBSTRUCTION: ICD-10-CM

## 2017-05-16 DIAGNOSIS — Z01.818 PRE-OP EXAM: ICD-10-CM

## 2017-05-16 DIAGNOSIS — M79.2 NEUROPATHIC PAIN: ICD-10-CM

## 2017-05-16 LAB
ATRIAL RATE - MUSE: 73 BPM
DIASTOLIC BLOOD PRESSURE - MUSE: NORMAL MMHG
INTERPRETATION ECG - MUSE: NORMAL
P AXIS - MUSE: 16 DEGREES
PR INTERVAL - MUSE: 148 MS
QRS DURATION - MUSE: 72 MS
QT - MUSE: 382 MS
QTC - MUSE: 420 MS
R AXIS - MUSE: 82 DEGREES
SYSTOLIC BLOOD PRESSURE - MUSE: NORMAL MMHG
T AXIS - MUSE: 59 DEGREES
VENTRICULAR RATE- MUSE: 73 BPM

## 2017-05-16 ASSESSMENT — MIFFLIN-ST. JEOR: SCORE: 1341.11

## 2017-05-17 ENCOUNTER — COMMUNICATION - HEALTHEAST (OUTPATIENT)
Dept: INTERNAL MEDICINE | Facility: CLINIC | Age: 52
End: 2017-05-17

## 2017-05-25 ENCOUNTER — AMBULATORY - HEALTHEAST (OUTPATIENT)
Dept: SURGERY | Facility: CLINIC | Age: 52
End: 2017-05-25

## 2017-05-25 ENCOUNTER — RECORDS - HEALTHEAST (OUTPATIENT)
Dept: ADMINISTRATIVE | Facility: OTHER | Age: 52
End: 2017-05-25

## 2017-05-25 ENCOUNTER — AMBULATORY - HEALTHEAST (OUTPATIENT)
Dept: VASCULAR SURGERY | Facility: CLINIC | Age: 52
End: 2017-05-25

## 2017-05-25 DIAGNOSIS — K80.20 CHOLELITHIASIS: ICD-10-CM

## 2017-05-31 ENCOUNTER — OFFICE VISIT (OUTPATIENT)
Dept: NEUROLOGY | Facility: CLINIC | Age: 52
End: 2017-05-31

## 2017-05-31 ENCOUNTER — COMMUNICATION - HEALTHEAST (OUTPATIENT)
Dept: INTERNAL MEDICINE | Facility: CLINIC | Age: 52
End: 2017-05-31

## 2017-05-31 ENCOUNTER — RECORDS - HEALTHEAST (OUTPATIENT)
Dept: ADMINISTRATIVE | Facility: OTHER | Age: 52
End: 2017-05-31

## 2017-05-31 VITALS — RESPIRATION RATE: 18 BRPM | DIASTOLIC BLOOD PRESSURE: 68 MMHG | HEART RATE: 78 BPM | SYSTOLIC BLOOD PRESSURE: 122 MMHG

## 2017-05-31 DIAGNOSIS — L71.9 ROSACEA: Primary | ICD-10-CM

## 2017-05-31 DIAGNOSIS — M47.816 LUMBAR SPONDYLOSIS: ICD-10-CM

## 2017-05-31 DIAGNOSIS — M47.812 CERVICAL SPONDYLOSIS: ICD-10-CM

## 2017-05-31 DIAGNOSIS — Z88.9 ATOPY: ICD-10-CM

## 2017-05-31 ASSESSMENT — PAIN SCALES - GENERAL: PAINLEVEL: NO PAIN (0)

## 2017-05-31 NOTE — MR AVS SNAPSHOT
After Visit Summary   5/31/2017    Hill Odom    MRN: [id number]           Patient Information     Date Of Birth          1965        Visit Information        Provider Department      5/31/2017 8:00 AM Jace Henderson MD [facility] Neurology Clinic        Today's Diagnoses     Rosacea    -  1    Atopy           Follow-ups after your visit        Additional Services     DERMATOLOGY REFERRAL       Your provider has referred you to: [facility]: Dermatology Clinic [facility] [phone]   [email or web address]    Please be aware that coverage of these services is subject to the terms and limitations of your health insurance plan.  Call member services at your health plan with any benefit or coverage questions.      Please bring the following with you to your appointment:    (1) Any X-Rays, CTs or MRIs which have been performed.  Contact the facility where they were done to arrange for  prior to your scheduled appointment.    (2) List of current medications  (3) This referral request   (4) Any documents/labs given to you for this referral                  Who to contact     Please call your clinic at [phone] to:    Ask questions about your health    Make or cancel appointments    Discuss your medicines    Learn about your test results    Speak to your doctor   If you have compliments or concerns about an experience at your clinic, or if you wish to file a complaint, please contact [facility] Patient Relations at [phone] or email us at [email or web address]         Additional Information About Your Visit        MyChart Information     [employer] gives you secure access to your electronic health record. If you see a primary care provider, you can also send messages to your care team and make appointments. If you have questions, please call your primary care clinic.  If you do not  have a primary care provider, please call [phone] and they will assist you.      [employer] is an electronic gateway that provides easy, online access to your medical records. With [employer], you can request a clinic appointment, read your test results, renew a prescription or communicate with your care team.     To access your existing account, please contact your [facility] Clinic or call [phone] for assistance.        Care EveryWhere ID     This is your Care EveryWhere ID. This could be used by other organizations to access your [place] medical records  [id number]        Your Vitals Were     Pulse Respirations                78 18           Blood Pressure from Last 3 Encounters:   05/31/17 122/68   03/14/17 (!) 125/92   02/14/17 126/86    Weight from Last 3 Encounters:   No data found for Wt              We Performed the Following     DERMATOLOGY REFERRAL          Today's Medication Changes          These changes are accurate as of: 5/31/17 11:59 PM.  If you have any questions, ask your nurse or doctor.               Stop taking these medicines if you haven't already. Please contact your care team if you have questions.     gabapentin 100 MG capsule   Commonly known as:  NEURONTIN   Stopped by:  Jace Henderson MD                    Primary Care Provider Office Phone # Fax #    Matt Sung [phone]       [facility] [address]  [place] [address]        Thank you!     Thank you for choosing [facility] NEUROLOGY CLINIC  for your care. Our goal is always to provide you with excellent care. Hearing back from our patients is one way we can continue to improve our services. Please take a few minutes to complete the written survey that you may receive in the mail after your visit with us. Thank you!             Your Updated Medication List - Protect others around you: Learn how to safely use, store and throw  away your medicines at www.disposemymeds.org.          This list is accurate as of: 5/31/17 11:59 PM.  Always use your most recent med list.                   Brand Name Dispense Instructions for use    SUMATRIPTAN SUCCINATE PO      Take 100 mg by mouth as needed for migraine

## 2017-05-31 NOTE — LETTER
"2017       RE: Hill Odom   [address]  [facility] [place] [address]     Dear Colleague,    Thank you for referring your patient, Hill Odom, to the [facility] NEUROLOGY CLINIC at [facility]. Please see a copy of my visit note below.    May 31, 2017         Matt Sung MD   [facility]   [address]   [place]  [address]      RE: Hill Odom   MRN: [id number]   : 1965      Dear Dr. Sung:      This is in regard to followup on Hill Odom.  The patient returned today with chief complaint of spine discomfort as well as sensory disturbance.      The patient said that she did get improvement in some of her symptoms taking gabapentin for 4 weeks.  She then went off the medicine.  She noted her neck started to hurt.  She continued to have pain in the middle of her back.  She did note that paresthesias of her feet have continued intermittently but are not persistent.  She noted the \"electric sensation\" she had in her lower back and legs did seem to leave.  She thought she had some trouble with doxycycline but then went back on it and for a time she was on it with gabapentin, but thought she had reaction.  By , she noted her face was better.  Then she developed swelling involving her hands and feet that was painful.  She went off gabapentin.  The patient did note that she was started on a salve for her acne rosacea.  The patient also reviewed with me having her gallbladder removed.  This did not help her spine pain but it did help some chronic burning she had in her abdomen.  She was treated with ciprofloxacin after surgery and developed a rash and had to be treated with Benadryl.  She has basically suffered from atopy it sounds like in the past.  She also developed since the time I saw her having hives earlier that did not relate to antibiotic but did probably relate to eating tomatoes.  She " wondered about referral to a different allergist.  I told her at [facility] allergists could relate to dermatologic problems.  I suggested she go through our Dermatology Department to see if they can come up with any further treatment for acne rosacea and her atopy.  She did talk about her vitamin D and B12 levels and has continued on replacement.  I suggested that these levels be rechecked under your direction this year.  I had referred her to [facility] in the past, but she had not gone ahead with physical therapy as I suggested.  I went over with her the actual MRI scan that was done at [facility] from 07/19/2016 of the lumbar spine.  This showed at the L4-5 level an annular bulge and a right foraminal disk protrusion with an annular fissure which encroached on the exiting right L4 nerve root.  She has some slight annular bulging also at L2-3 and L3-4.  The patient reviewed with me her thoracic MRI scan and this showed some minor degenerative changes with some minor disk bulging.  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  I explained to her that there was no evidence of spinal cord compression on these studies and she earlier had a normal MRI scan of the brain.  I did talk with her about her normal EMG study through Dr. Calderon in our neuromuscular department.  I reassured her of this.  She is interested in having a dermatology consultation and I made arrangements for this to be done at [facility].  I stressed the importance of getting involved in a physical therapy program.  Some of her symptoms started after a long bike ride, and I talked with her about how this could aggravate the spine issues.  She is going to be seen in this office now on a p.r.n. basis.  If she does not  improve, I have talked with her about having followup MRI scan of the lumbar spine done again.  I noted her blood pressure today was 110/78 with a pulse of 76 seated.  Upon standing, it was 122/68 with a pulse of 78.      I have not renewed her gabapentin and did tell her at this point to hold off on restarting that.      I spent 25 minutes with the patient today.  Over 50% of the time did involve counseling and coordination of care.      Thank you for allowing me to see this patient.      Sincerely yours,         LYNNE PIERCE MD             D: 2017 18:24   T: 2017 14:31   MT: [email or web address]      Name:     SUZAN KENT   MRN:      [id number]        Account:      [id number]   :      1965           Service Date: 2017      Document: [id number]

## 2017-06-01 ENCOUNTER — COMMUNICATION - HEALTHEAST (OUTPATIENT)
Dept: INTERNAL MEDICINE | Facility: CLINIC | Age: 52
End: 2017-06-01

## 2017-06-01 NOTE — PROGRESS NOTES
"May 31, 2017         Matt Sung MD   [facility]   [address]   [place]  [address]      RE: Hill Odom   MRN: [id number]   : 1965      Dear Dr. Sung:      This is in regard to followup on Hill Odom.  The patient returned today with chief complaint of spine discomfort as well as sensory disturbance.      The patient said that she did get improvement in some of her symptoms taking gabapentin for 4 weeks.  She then went off the medicine.  She noted her neck started to hurt.  She continued to have pain in the middle of her back.  She did note that paresthesias of her feet have continued intermittently but are not persistent.  She noted the \"electric sensation\" she had in her lower back and legs did seem to leave.  She thought she had some trouble with doxycycline but then went back on it and for a time she was on it with gabapentin, but thought she had reaction.  By , she noted her face was better.  Then she developed swelling involving her hands and feet that was painful.  She went off gabapentin.  The patient did note that she was started on a salve for her acne rosacea.  The patient also reviewed with me having her gallbladder removed.  This did not help her spine pain but it did help some chronic burning she had in her abdomen.  She was treated with ciprofloxacin after surgery and developed a rash and had to be treated with Benadryl.  She has basically suffered from atopy it sounds like in the past.  She also developed since the time I saw her having hives earlier that did not relate to antibiotic but did probably relate to eating tomatoes.  She wondered about referral to a different allergist.  I told her at [email or web address] allergists could relate to dermatologic problems.  I suggested she go through our Dermatology Department to see if they can come up with any further treatment for acne rosacea and her atopy.  She did talk about her vitamin D and B12 levels and has " continued on replacement.  I suggested that these levels be rechecked under your direction this year.  I had referred her to [facility] in the past, but she had not gone ahead with physical therapy as I suggested.  I went over with her the actual MRI scan that was done at [facility] from 07/19/2016 of the lumbar spine.  This showed at the L4-5 level an annular bulge and a right foraminal disk protrusion with an annular fissure which encroached on the exiting right L4 nerve root.  She has some slight annular bulging also at L2-3 and L3-4.  The patient reviewed with me her thoracic MRI scan and this showed some minor degenerative changes with some minor disk bulging.  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  I explained to her that there was no evidence of spinal cord compression on these studies and she earlier had a normal MRI scan of the brain.  I did talk with her about her normal EMG study through Dr. Calderon in our neuromuscular department.  I reassured her of this.  She is interested in having a dermatology consultation and I made arrangements for this to be done at [facility].  I stressed the importance of getting involved in a physical therapy program.  Some of her symptoms started after a long bike ride, and I talked with her about how this could aggravate the spine issues.  She is going to be seen in this office now on a p.r.n. basis.  If she does not improve, I have talked with her about having followup MRI scan of the lumbar spine done again.  I noted her blood pressure today was 110/78 with a pulse of 76 seated.  Upon standing, it was 122/68 with a pulse of 78.      I have not renewed her gabapentin and did tell her at this point to hold off on restarting  that.      I spent 25 minutes with the patient today.  Over 50% of the time did involve counseling and coordination of care.      Thank you for allowing me to see this patient.      Sincerely yours,         LYNNE PIERCE MD             D: 2017 18:24   T: 2017 14:31   MT: dany      Name:     SUZAN KENT   MRN:      [id number]        Account:      [id number]   :      1965           Service Date: 2017      Document: [id number]

## 2017-06-05 ENCOUNTER — COMMUNICATION - HEALTHEAST (OUTPATIENT)
Dept: SURGERY | Facility: CLINIC | Age: 52
End: 2017-06-05

## 2017-06-07 ENCOUNTER — COMMUNICATION - HEALTHEAST (OUTPATIENT)
Dept: INTERNAL MEDICINE | Facility: CLINIC | Age: 52
End: 2017-06-07

## 2017-06-08 ENCOUNTER — OFFICE VISIT - HEALTHEAST (OUTPATIENT)
Dept: SURGERY | Facility: CLINIC | Age: 52
End: 2017-06-08

## 2017-06-08 DIAGNOSIS — Z98.890 POST-OPERATIVE STATE: ICD-10-CM

## 2017-06-22 DIAGNOSIS — Z88.9 MULTIPLE ALLERGIES: Primary | ICD-10-CM

## 2017-06-27 ENCOUNTER — RECORDS - HEALTHEAST (OUTPATIENT)
Dept: ADMINISTRATIVE | Facility: OTHER | Age: 52
End: 2017-06-27

## 2017-06-27 ENCOUNTER — TRANSFERRED RECORDS (OUTPATIENT)
Dept: HEALTH INFORMATION MANAGEMENT | Facility: CLINIC | Age: 52
End: 2017-06-27

## 2017-08-08 ENCOUNTER — COMMUNICATION - HEALTHEAST (OUTPATIENT)
Dept: INTERNAL MEDICINE | Facility: CLINIC | Age: 52
End: 2017-08-08

## 2017-08-08 ENCOUNTER — OFFICE VISIT - HEALTHEAST (OUTPATIENT)
Dept: INTERNAL MEDICINE | Facility: CLINIC | Age: 52
End: 2017-08-08

## 2017-08-08 DIAGNOSIS — L71.9 ROSACEA: ICD-10-CM

## 2017-08-08 DIAGNOSIS — M79.2 NEUROPATHIC PAIN: ICD-10-CM

## 2017-08-08 DIAGNOSIS — Z00.00 HEALTHCARE MAINTENANCE: ICD-10-CM

## 2017-08-08 DIAGNOSIS — T78.40XA ALLERGIC REACTION: ICD-10-CM

## 2017-08-08 DIAGNOSIS — M54.9 BACK PAIN: ICD-10-CM

## 2017-08-08 ASSESSMENT — MIFFLIN-ST. JEOR: SCORE: 1341.11

## 2017-08-16 ENCOUNTER — COMMUNICATION - HEALTHEAST (OUTPATIENT)
Dept: INTERNAL MEDICINE | Facility: CLINIC | Age: 52
End: 2017-08-16

## 2017-08-24 ENCOUNTER — HOSPITAL ENCOUNTER (OUTPATIENT)
Dept: MAMMOGRAPHY | Facility: HOSPITAL | Age: 52
Discharge: HOME | End: 2017-08-24
Attending: INTERNAL MEDICINE

## 2017-08-24 DIAGNOSIS — Z00.00 HEALTHCARE MAINTENANCE: ICD-10-CM

## 2017-08-29 ENCOUNTER — COMMUNICATION - HEALTHEAST (OUTPATIENT)
Dept: INTERNAL MEDICINE | Facility: CLINIC | Age: 52
End: 2017-08-29

## 2017-09-11 ASSESSMENT — ENCOUNTER SYMPTOMS
FEVER: 0
POLYDIPSIA: 0
SINUS CONGESTION: 0
SMELL DISTURBANCE: 0
TASTE DISTURBANCE: 0
NECK PAIN: 0
JOINT SWELLING: 0
POLYPHAGIA: 0
HALLUCINATIONS: 0
NAIL CHANGES: 0
CHILLS: 1
FATIGUE: 0
MUSCLE WEAKNESS: 0
MYALGIAS: 0
POOR WOUND HEALING: 0
SORE THROAT: 0
TROUBLE SWALLOWING: 0
NIGHT SWEATS: 1
WEIGHT LOSS: 0
HOARSE VOICE: 0
ARTHRALGIAS: 1
WEIGHT GAIN: 0
INCREASED ENERGY: 0
MUSCLE CRAMPS: 1
ALTERED TEMPERATURE REGULATION: 0
NECK MASS: 0
SKIN CHANGES: 0
STIFFNESS: 0
DECREASED APPETITE: 0
SINUS PAIN: 0
BACK PAIN: 1

## 2017-09-15 ENCOUNTER — PRE VISIT (OUTPATIENT)
Dept: PULMONOLOGY | Facility: CLINIC | Age: 52
End: 2017-09-15

## 2017-09-15 NOTE — TELEPHONE ENCOUNTER
1.  Date/reason for appt:9/25/17, Allergies     2.  Referring provider: LYNNE PIERCE    3.  Call to patient (Yes / No - short description): No, referred     4.  Previous care at / records requested from:   [facility]

## 2017-09-20 ENCOUNTER — RECORDS - HEALTHEAST (OUTPATIENT)
Dept: ADMINISTRATIVE | Facility: OTHER | Age: 52
End: 2017-09-20

## 2017-09-20 ENCOUNTER — TRANSFERRED RECORDS (OUTPATIENT)
Dept: HEALTH INFORMATION MANAGEMENT | Facility: CLINIC | Age: 52
End: 2017-09-20

## 2017-09-25 ENCOUNTER — RECORDS - HEALTHEAST (OUTPATIENT)
Dept: ADMINISTRATIVE | Facility: OTHER | Age: 52
End: 2017-09-25

## 2017-09-25 ENCOUNTER — OFFICE VISIT (OUTPATIENT)
Dept: PULMONOLOGY | Facility: CLINIC | Age: 52
End: 2017-09-25
Attending: ALLERGY & IMMUNOLOGY
Payer: COMMERCIAL

## 2017-09-25 VITALS
DIASTOLIC BLOOD PRESSURE: 75 MMHG | HEART RATE: 72 BPM | WEIGHT: 165 LBS | RESPIRATION RATE: 18 BRPM | OXYGEN SATURATION: 97 % | SYSTOLIC BLOOD PRESSURE: 113 MMHG | BODY MASS INDEX: 27.49 KG/M2 | HEIGHT: 65 IN

## 2017-09-25 DIAGNOSIS — J30.1 CHRONIC SEASONAL ALLERGIC RHINITIS DUE TO POLLEN: ICD-10-CM

## 2017-09-25 DIAGNOSIS — Z88.9 MULTIPLE ALLERGIES: ICD-10-CM

## 2017-09-25 DIAGNOSIS — L27.2 DERMATITIS DUE TO FOOD TAKEN INTERNALLY: Primary | ICD-10-CM

## 2017-09-25 PROCEDURE — 95004 PERQ TESTS W/ALRGNC XTRCS: CPT | Performed by: ALLERGY & IMMUNOLOGY

## 2017-09-25 PROCEDURE — 99212 OFFICE O/P EST SF 10 MIN: CPT | Mod: ZF

## 2017-09-25 RX ORDER — SUMATRIPTAN 100 MG/1
TABLET, FILM COATED ORAL
COMMUNITY
Start: 2016-12-19 | End: 2021-10-05

## 2017-09-25 ASSESSMENT — PAIN SCALES - GENERAL: PAINLEVEL: MILD PAIN (2)

## 2017-09-25 NOTE — MR AVS SNAPSHOT
After Visit Summary   9/25/2017    Hill Odom    MRN: [id number]           Patient Information     Date Of Birth          1965        Visit Information        Provider Department      9/25/2017 7:15 AM Olvin Holcomb MD [facility]        Today's Diagnoses     Dermatitis due to food taken internally    -  1    Multiple allergies        Chronic seasonal allergic rhinitis due to pollen           Follow-ups after your visit        Follow-up notes from your care team     Return if symptoms worsen or fail to improve.      Who to contact     If you have questions or need follow up information about today's clinic visit or your schedule please contact [facility] directly at [phone].  Normal or non-critical lab and imaging results will be communicated to you by Everypointhart, letter or phone within 4 business days after the clinic has received the results. If you do not hear from us within 7 days, please contact the clinic through Everypointhart or phone. If you have a critical or abnormal lab result, we will notify you by phone as soon as possible.  Submit refill requests through Roovyn or call your pharmacy and they will forward the refill request to us. Please allow 3 business days for your refill to be completed.          Additional Information About Your Visit        MyChart Information     Roovyn gives you secure access to your electronic health record. If you see a primary care provider, you can also send messages to your care team and make appointments. If you have questions, please call your primary care clinic.  If you do not have a primary care provider, please call [phone] and they will assist you.        Care EveryWhere ID     This is your Care EveryWhere ID. This could be used by other organizations to access your [place] medical records  [id number]        Your Vitals Were     Pulse Respirations Height Pulse  "Oximetry BMI (Body Mass Index)       72 18 1.651 m (5' 5\") 97% 27.46 kg/m2        Blood Pressure from Last 3 Encounters:   09/25/17 113/75   05/31/17 122/68   03/14/17 (!) 125/92    Weight from Last 3 Encounters:   09/25/17 74.8 kg (165 lb)              We Performed the Following     ALLERGY/ASTHMA ADULT REFERRAL     Percutaneous test with allergic extract with number of test to be specified          Today's Medication Changes          These changes are accurate as of: 9/25/17  8:27 AM.  If you have any questions, ask your nurse or doctor.               These medicines have changed or have updated prescriptions.        Dose/Directions    SUMAtriptan 100 MG tablet   Commonly known as:  IMITREX   This may have changed:  Another medication with the same name was removed. Continue taking this medication, and follow the directions you see here.   Changed by:  Olvin Holcomb MD        TAKE 1 TABLET (100 MG TOTAL) BY MOUTH EVERY 2 (TWO) HOURS AS NEEDED FOR MIGRAINE.   Refills:  0                Primary Care Provider Office Phone # Fax #    Matt Sung [phone]       [facility] [address]  [place] [address]        Equal Access to Services     JACOB SCHWARZ AH: Hadii marilyn Choe, waaxda luqadaha, qaybta kaalmada adekathyyada, lisette barton. So [facility] [phone].    ATENCIÓN: Si habla español, tiene a foster disposición servicios gratuitos de asistencia lingüística. Mickie al [phone].    We comply with applicable federal civil rights laws and [place] laws. We do not discriminate on the basis of race, color, national origin, age, disability sex, sexual orientation or gender identity.            Thank you!     Thank you for choosing [facility]  for your care. Our goal is always to provide you with excellent care. Hearing back from our patients is one way we can continue to improve our services. Please take a " few minutes to complete the written survey that you may receive in the mail after your visit with us. Thank you!             Your Updated Medication List - Protect others around you: Learn how to safely use, store and throw away your medicines at www.disposemymeds.org.          This list is accurate as of: 9/25/17  8:27 AM.  Always use your most recent med list.                   Brand Name Dispense Instructions for use Diagnosis    Cholecalciferol 4000 UNITS Caps      Take 1 capsule by mouth        SUMAtriptan 100 MG tablet    IMITREX     TAKE 1 TABLET (100 MG TOTAL) BY MOUTH EVERY 2 (TWO) HOURS AS NEEDED FOR MIGRAINE.

## 2017-09-25 NOTE — NURSING NOTE
Chief Complaint   Patient presents with     Consult     New consult on Destinee and her allergys     Devendra Barajas CMA at 7:01 AM on 9/25/2017

## 2017-09-25 NOTE — PROGRESS NOTES
Reason for Visit  Hill Odom is a 52 year old female who is referred by Matt Sung for possible allergies.    Allergy HPI  Destinee presents today for an initial consultation. She is concerned about various foods are causing problems.  Peanut butter may cause hives on the face. Other nuts such as an Nutella cause a rash on the face. She also wonders about cheese cauliflower onions beans corn. Also tomato and chicken Parmesan. She seems to pop makes her hands and feet itch. She said rosacea that she thinks is improved after discontinuing peanut butter but may been better after starting ivermectin 1%. She did see an allergist who did not do any testing. She also notices supportive medications caused joint pain such as doxycycline. She also wonders about aspirin and ibuprofen. She has some seasonal allergy symptoms and has a positive testing in the past to [facility] per her report. She notes more problems in the fall.    Social history she has a cat she does not smoke.    Ludy and mom with drug allergies.    The patient was seen and examined by Olvin Rouse MD   Current Outpatient Prescriptions   Medication     Cholecalciferol 4000 UNITS CAPS     SUMAtriptan (IMITREX) 100 MG tablet     No current facility-administered medications for this visit.      Allergies   Allergen Reactions     Amoxicillin      hives     Augmented Betamethasone Diprop [Betamethasone]      Ciprofloxacin      Doxycycline Rash     Social History     Social History     Marital status:      Spouse name: N/A     Number of children: N/A     Years of education: N/A     Occupational History     Not on file.     Social History Main Topics     Smoking status: Never Smoker     Smokeless tobacco: Never Used     Alcohol use No     Drug use: Not on file     Sexual activity: Not on file     Other Topics Concern     Not on file     Social History Narrative     No past medical history on file.  No past surgical history on file.  No family history  "on file.      ROS   A complete ROS was otherwise negative except as noted in the HPI and the end of the note.  /75  Pulse 72  Resp 18  Ht 1.651 m (5' 5\")  Wt 74.8 kg (165 lb)  SpO2 97%  BMI 27.46 kg/m2  Exam:   GENERAL APPEARANCE: Well developed, well nourished, alert, and in no apparent distress.  EYES: PERRL, EOMI, conjunctiva clear non-injected  HENT: Nasal mucosa with no edema and no discharge. No nasal polyps.  No facial tenderness.  EARS: Canals clear, TMs normal  MOUTH: Oral mucosa is moist, without any lesions, no tonsillar enlargement, no oropharyngeal exudate.  RESP: Good air flow throughout.  No crackles. No rhonchi. No wheezes.  CV: Normal S1, S2, regular rhythm, normal rate. No murmur.  No rub. No gallop. No LE edema.   MS: Extremities normal. No clubbing. No cyanosis.  SKIN: No rashes noted  NEURO: Speech normal, normal strength and tone, normal gait and stance  PSYCH: Normal mentation, orientation to person, place, and time.  Results: 17 percutaneous food and environmental allergen (and 2 controls) extracts placed by MA and read by MD.          Assessment and plan: Destinee notes various foods cause multitude of problems mostly skin manifestations. Skin testing to do various foods today was done and she was negative to all of these. We also look for various pollens across reactive food pollen syndrome and she was negative to these as well. Based on her symptoms of joint pain testing for drugs would not be any benefit at this time. Also explained to her hand and feet itching is often from temperature fluctuations such as going from a very cold to a warm environment or vice versa often with washing of the hands in cold water. She'll be mindful of this.          Answers for HPI/ROS submitted by the patient on 9/11/2017   General Symptoms: Yes  Skin Symptoms: Yes  HENT Symptoms: Yes  EYE SYMPTOMS: No  HEART SYMPTOMS: No  LUNG SYMPTOMS: No  INTESTINAL SYMPTOMS: No  URINARY SYMPTOMS: No  GYNECOLOGIC " SYMPTOMS: No  BREAST SYMPTOMS: No  SKELETAL SYMPTOMS: Yes  BLOOD SYMPTOMS: No  NERVOUS SYSTEM SYMPTOMS: No  MENTAL HEALTH SYMPTOMS: No  Fever: No  Loss of appetite: No  Weight loss: No  Weight gain: No  Fatigue: No  Night sweats: Yes  Chills: Yes  Increased stress: Yes  Excessive hunger: No  Excessive thirst: No  Feeling hot or cold when others believe the temperature is normal: No  Loss of height: No  Post-operative complications: No  Surgical site pain: No  Hallucinations: No  Change in or Loss of Energy: No  Hyperactivity: No  Confusion: No  Changes in hair: No  Changes in moles/birth marks: No  Itching: Yes  Rashes: Yes  Changes in nails: No  Acne: Yes  Hair in places you don't want it: No  Change in facial hair: No  Warts: No  Non-healing sores: No  Scarring: No  Flaking of skin: No  Color changes of hands/feet in cold : No  Sun sensitivity: No  Skin thickening: No  Ear pain: Yes  Ear discharge: No  Hearing loss: No  Tinnitus: Yes  Nosebleeds: No  Congestion: No  Sinus pain: No  Trouble swallowing: No   Voice hoarseness: No  Mouth sores: No  Sore throat: No  Tooth pain: Yes  Gum tenderness: No  Bleeding gums: No  Change in taste: No  Change in sense of smell: No  Dry mouth: No  Hearing aid used: No  Neck lump: No  Back pain: Yes  Muscle aches: No  Neck pain: No  Swollen joints: No  Joint pain: Yes  Bone pain: Yes  Muscle cramps: Yes  Muscle weakness: No  Joint stiffness: No  Bone fracture: No

## 2017-09-25 NOTE — LETTER
9/25/2017       RE: Hill Odom  [address]  [facility] [facility] [address]     Dear Colleague,    Thank you for referring your patient, Hill Odom, to the [facility] at [facility]. Please see a copy of my visit note below.    Reason for Visit  Hill Odom is a 52 year old female who is referred by Matt Sung for possible allergies.    Allergy ALTAGRACIA  Destinee presents today for an initial consultation. She is concerned about various foods are causing problems.  Peanut butter may cause hives on the face. Other nuts such as an Nutella cause a rash on the face. She also wonders about cheese cauliflower onions beans corn. Also tomato and chicken Parmesan. She seems to pop makes her hands and feet itch. She said rosacea that she thinks is improved after discontinuing peanut butter but may been better after starting ivermectin 1%. She did see an allergist who did not do any testing. She also notices supportive medications caused joint pain such as doxycycline. She also wonders about aspirin and ibuprofen. She has some seasonal allergy symptoms and has a positive testing in the past to [facility] per her report. She notes more problems in the fall.    Social history she has a cat she does not smoke.    Ludy and mom with drug allergies.    The patient was seen and examined by Olvin Rouse MD   Current Outpatient Prescriptions   Medication     Cholecalciferol 4000 UNITS CAPS     SUMAtriptan (IMITREX) 100 MG tablet     No current facility-administered medications for this visit.      Allergies   Allergen Reactions     Amoxicillin      hives     Augmented Betamethasone Diprop [Betamethasone]      Ciprofloxacin      Doxycycline Rash     Social History     Social History     Marital status:      Spouse name: N/A     Number of children: N/A     Years of education: N/A     Occupational History     Not on file.     Social History Main Topics     Smoking  "status: Never Smoker     Smokeless tobacco: Never Used     Alcohol use No     Drug use: Not on file     Sexual activity: Not on file     Other Topics Concern     Not on file     Social History Narrative     No past medical history on file.  No past surgical history on file.  No family history on file.      ROS   A complete ROS was otherwise negative except as noted in the HPI and the end of the note.  /75  Pulse 72  Resp 18  Ht 1.651 m (5' 5\")  Wt 74.8 kg (165 lb)  SpO2 97%  BMI 27.46 kg/m2  Exam:   GENERAL APPEARANCE: Well developed, well nourished, alert, and in no apparent distress.  EYES: PERRL, EOMI, conjunctiva clear non-injected  HENT: Nasal mucosa with no edema and no discharge. No nasal polyps.  No facial tenderness.  EARS: Canals clear, TMs normal  MOUTH: Oral mucosa is moist, without any lesions, no tonsillar enlargement, no oropharyngeal exudate.  RESP: Good air flow throughout.  No crackles. No rhonchi. No wheezes.  CV: Normal S1, S2, regular rhythm, normal rate. No murmur.  No rub. No gallop. No LE edema.   MS: Extremities normal. No clubbing. No cyanosis.  SKIN: No rashes noted  NEURO: Speech normal, normal strength and tone, normal gait and stance  PSYCH: Normal mentation, orientation to person, place, and time.  Results: 17 percutaneous food and environmental allergen (and 2 controls) extracts placed by MA and read by MD.          Assessment and plan: Destinee notes various foods cause multitude of problems mostly skin manifestations. Skin testing to do various foods today was done and she was negative to all of these. We also look for various pollens across reactive food pollen syndrome and she was negative to these as well. Based on her symptoms of joint pain testing for drugs would not be any benefit at this time. Also explained to her hand and feet itching is often from temperature fluctuations such as going from a very cold to a warm environment or vice versa often with washing of the " hands in cold water. She'll be mindful of this.      Again, thank you for allowing me to participate in the care of your patient.      Sincerely,    Olvin Rouse MD

## 2017-10-07 ENCOUNTER — OFFICE VISIT - HEALTHEAST (OUTPATIENT)
Dept: FAMILY MEDICINE | Facility: CLINIC | Age: 52
End: 2017-10-07

## 2017-10-07 DIAGNOSIS — B02.9 HERPES ZOSTER WITHOUT COMPLICATION: ICD-10-CM

## 2017-10-08 ENCOUNTER — COMMUNICATION - HEALTHEAST (OUTPATIENT)
Dept: INTERNAL MEDICINE | Facility: CLINIC | Age: 52
End: 2017-10-08

## 2017-10-16 ENCOUNTER — COMMUNICATION - HEALTHEAST (OUTPATIENT)
Dept: INTERNAL MEDICINE | Facility: CLINIC | Age: 52
End: 2017-10-16

## 2017-10-17 ENCOUNTER — COMMUNICATION - HEALTHEAST (OUTPATIENT)
Dept: INTERNAL MEDICINE | Facility: CLINIC | Age: 52
End: 2017-10-17

## 2017-11-10 ENCOUNTER — COMMUNICATION - HEALTHEAST (OUTPATIENT)
Dept: INTERNAL MEDICINE | Facility: CLINIC | Age: 52
End: 2017-11-10

## 2017-11-10 ENCOUNTER — OFFICE VISIT - HEALTHEAST (OUTPATIENT)
Dept: INTERNAL MEDICINE | Facility: CLINIC | Age: 52
End: 2017-11-10

## 2017-11-10 DIAGNOSIS — M79.2 NEUROPATHIC PAIN: ICD-10-CM

## 2017-11-10 DIAGNOSIS — B02.9 SHINGLES: ICD-10-CM

## 2017-11-10 ASSESSMENT — MIFFLIN-ST. JEOR: SCORE: 1336.58

## 2017-11-13 ENCOUNTER — COMMUNICATION - HEALTHEAST (OUTPATIENT)
Dept: INTERNAL MEDICINE | Facility: CLINIC | Age: 52
End: 2017-11-13

## 2017-11-28 ENCOUNTER — COMMUNICATION - HEALTHEAST (OUTPATIENT)
Dept: INTERNAL MEDICINE | Facility: CLINIC | Age: 52
End: 2017-11-28

## 2017-12-15 ENCOUNTER — COMMUNICATION - HEALTHEAST (OUTPATIENT)
Dept: INTERNAL MEDICINE | Facility: CLINIC | Age: 52
End: 2017-12-15

## 2017-12-26 ENCOUNTER — OFFICE VISIT - HEALTHEAST (OUTPATIENT)
Dept: INTERNAL MEDICINE | Facility: CLINIC | Age: 52
End: 2017-12-26

## 2017-12-26 DIAGNOSIS — G62.9 PERIPHERAL NEUROPATHY: ICD-10-CM

## 2017-12-26 DIAGNOSIS — M79.2 NEUROPATHIC PAIN: ICD-10-CM

## 2017-12-26 LAB — HBA1C MFR BLD: 5.4 % (ref 3.5–6)

## 2017-12-26 ASSESSMENT — MIFFLIN-ST. JEOR: SCORE: 1341.11

## 2017-12-27 ENCOUNTER — COMMUNICATION - HEALTHEAST (OUTPATIENT)
Dept: INTERNAL MEDICINE | Facility: CLINIC | Age: 52
End: 2017-12-27

## 2017-12-27 DIAGNOSIS — G62.9 NEUROPATHY: ICD-10-CM

## 2017-12-31 ENCOUNTER — HEALTH MAINTENANCE LETTER (OUTPATIENT)
Age: 52
End: 2017-12-31

## 2018-01-05 ASSESSMENT — ENCOUNTER SYMPTOMS
TROUBLE SWALLOWING: 0
SORE THROAT: 0
HOARSE VOICE: 0
NECK MASS: 0
SINUS PAIN: 0
SMELL DISTURBANCE: 0
SINUS CONGESTION: 0
TASTE DISTURBANCE: 0
NAIL CHANGES: 0
POOR WOUND HEALING: 1
SKIN CHANGES: 0

## 2018-01-19 ENCOUNTER — RECORDS - HEALTHEAST (OUTPATIENT)
Dept: ADMINISTRATIVE | Facility: OTHER | Age: 53
End: 2018-01-19

## 2018-01-19 ENCOUNTER — OFFICE VISIT (OUTPATIENT)
Dept: NEUROLOGY | Facility: CLINIC | Age: 53
End: 2018-01-19
Payer: COMMERCIAL

## 2018-01-19 VITALS
DIASTOLIC BLOOD PRESSURE: 77 MMHG | TEMPERATURE: 97.8 F | HEIGHT: 65 IN | OXYGEN SATURATION: 100 % | RESPIRATION RATE: 24 BRPM | WEIGHT: 168 LBS | HEART RATE: 71 BPM | BODY MASS INDEX: 27.99 KG/M2 | SYSTOLIC BLOOD PRESSURE: 113 MMHG

## 2018-01-19 DIAGNOSIS — R20.9 SENSORY DISTURBANCE: ICD-10-CM

## 2018-01-19 DIAGNOSIS — R20.9 SENSORY DISTURBANCE: Primary | ICD-10-CM

## 2018-01-19 PROBLEM — L71.9 ROSACEA: Status: ACTIVE | Noted: 2018-01-19

## 2018-01-19 PROBLEM — M79.2 NEUROPATHIC PAIN: Status: ACTIVE | Noted: 2017-05-16

## 2018-01-19 PROBLEM — L50.3 DERMATOGRAPHIA: Status: ACTIVE | Noted: 2018-01-19

## 2018-01-19 PROBLEM — E78.5 HYPERLIPIDEMIA: Status: ACTIVE | Noted: 2018-01-19

## 2018-01-19 PROBLEM — G43.909 MIGRAINE HEADACHE: Status: ACTIVE | Noted: 2018-01-19

## 2018-01-19 LAB
CRP SERPL-MCNC: <2.9 MG/L (ref 0–8)
ERYTHROCYTE [SEDIMENTATION RATE] IN BLOOD BY WESTERGREN METHOD: 15 MM/H (ref 0–30)

## 2018-01-19 RX ORDER — LANOLIN ALCOHOL/MO/W.PET/CERES
1000 CREAM (GRAM) TOPICAL WEEKLY
COMMUNITY

## 2018-01-19 ASSESSMENT — PAIN SCALES - GENERAL: PAINLEVEL: MILD PAIN (3)

## 2018-01-19 NOTE — LETTER
2018       RE: Hill Odom   [address]  [facility] [address]     Dear Colleague,    Thank you for referring your patient, Hill Odom, to the [facility] at [facility]. Please see a copy of my visit note below.    Service Date: 2018      RE: Hill Odom   MRN: [id number]   : 1965      Dear Matt:      This is in regard to followup on Hill Odom.  The patient returned today with a chief complaint of burning in her hands and feet with color change involving her feet.      The patient has noted a new symptom.  This is particularly prominent involving her feet.  She notes a burning sensation with redness of her feet.  She did develop herpes zoster involving the bottom of her foot.  She had pictures of it and it does appear to be typical.  She also had it on her buttock on the left.  The patient has noted that her feet do turn bright red with the pain and she said light touch is uncomfortable.  Her previous spine issues have improved to a marked degree under her physical therapist's directions, Ji Moncada at [facility].  She is pleased with him.  She did note that gabapentin has helped some of her joint discomfort.  She has continued under your direction.  The patient did have results of her most recent visit for me.  She reviewed your letter to her from 2017, and basically, she had a normal comprehensive metabolic profile as well as complete blood count.  Her B12 level was 438 pg.  Her TSH was 1.80 and A1c hemoglobin 5.4.      Neurologic examination revealed a pleasant woman.  Gait, station, cerebellar testing, muscle stretch reflexes, plantar stimulation, strength, as well as superficial and cortical sensory testing are unremarkable.  I felt full peripheral pulses.  She has some tenderness involving her metatarsal areas.      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      I did discuss with the patient the  possibility that she could have erythromelalgia.  I recommend that she try aspirin to see if this helps.  She will have further blood work now including CRP, sedimentation rate, and ELP for monoclonal protein.  I did encourage her to continue gabapentin as it may help this condition.  I told her this was a poorly understood condition but did recommend that she see an autonomic specialist, Dr. Downs who is a neurologist at [facility].  I told her I would be happy to see her in followup if needed.      I spent 25 minutes with the patient today.  Over 50% of the time did involve counseling and coordination of care.       Thank you for allowing me to see this patient.         LYNNE HENDERSON MD             D: 2018   T: 2018   MT: dany      Name:     SUZAN KENT   MRN:      [id number]        Account:      [id number]   :      1965           Service Date: 2018      Document: [id number]       Again, thank you for allowing me to participate in the care of your patient.      Sincerely,    Lynne Henderson MD    CC:  Matt Sung MD   [facility]   [address], [address]   [place]  [address]

## 2018-01-19 NOTE — MR AVS SNAPSHOT
After Visit Summary   1/19/2018    Hill Odom    MRN: [id number]           Patient Information     Date Of Birth          1965        Visit Information        Provider Department      1/19/2018 4:00 PM Jace Henderson MD [facility]        Today's Diagnoses     Sensory disturbance    -  1       Follow-ups after your visit        Additional Services     NEUROLOGY ADULT REFERRAL       Your provider has referred you for the following:   Consult at DR. GRAN[facility]    Please be aware that coverage of these services is subject to the terms and limitations of your health insurance plan.  Call member services at your health plan with any benefit or coverage questions.      Please bring the following with you to your appointment:    (1) Any X-Rays, CTs or MRIs which have been performed.  Contact the facility where they were done to arrange for  prior to your scheduled appointment.    (2) List of current medications  (3) This referral request   (4) Any documents/labs given to you for this referral                  Your next 10 appointments already scheduled     Jan 19, 2018  5:15 PM CST   LAB with [facility] ([facility])    [address] [place] [id number] [phone]           Please do not eat 10-12 hours before your appointment if you are coming in fasting for labs on lipids, cholesterol, or glucose (sugar). This does not apply to pregnant women. Water, hot tea and black coffee (with nothing added) are okay. Do not drink other fluids, diet soda or chew gum.              Future tests that were ordered for you today     Open Future Orders        Priority Expected Expires Ordered    Protein Immunofixation Serum Routine 1/24/2018 1/19/2019 1/19/2018    Erythrocyte sedimentation rate auto Routine  1/19/2019 1/19/2018    CRP inflammation Routine  1/19/2019 1/19/2018            Who to contact     Please call your clinic  "at [phone] to:    Ask questions about your health    Make or cancel appointments    Discuss your medicines    Learn about your test results    Speak to your doctor   If you have compliments or concerns about an experience at your clinic, or if you wish to file a complaint, please contact [facility] Patient Relations at [phone] or email us at [email or web address]         Additional Information About Your Visit        Taniumhart Information     [employer] gives you secure access to your electronic health record. If you see a primary care provider, you can also send messages to your care team and make appointments. If you have questions, please call your primary care clinic.  If you do not have a primary care provider, please call [phone] and they will assist you.      [employer] is an electronic gateway that provides easy, online access to your medical records. With [employer], you can request a clinic appointment, read your test results, renew a prescription or communicate with your care team.     To access your existing account, please contact your [facility] Clinic or call [phone] for assistance.        Care EveryWhere ID     This is your Care EveryWhere ID. This could be used by other organizations to access your [place] medical records  [id number]        Your Vitals Were     Pulse Temperature Respirations Height Pulse Oximetry Breastfeeding?    71 97.8  F (36.6  C) 24 1.651 m (5' 5\") 100% No    BMI (Body Mass Index)                   27.96 kg/m2            Blood Pressure from Last 3 Encounters:   01/19/18 113/77   09/25/17 113/75   05/31/17 122/68    Weight from Last 3 Encounters:   01/19/18 76.2 kg (168 lb)   09/25/17 74.8 kg (165 lb)              We Performed the Following     NEUROLOGY ADULT REFERRAL        Primary Care Provider Office Phone # Fax #    Matt Sung [phone]       [facility] [address]  [place] [address]        Equal Access to Services     [facility] Hospital[facility]AD[facility] : Hadii aad ku hadraissapraneeth Fredrickali, wabriannada sharadkyleha, qaartta kaisaichanning lee, lisette barton. So [facility] [phone].    ATENCIÓN: Si habla español, tiene a foster disposición servicios gratuitos de asistencia lingüística. Papoame al [phone].    We comply with applicable federal civil rights laws and [place] laws. We do not discriminate on the basis of race, color, national origin, age, disability, sex, sexual orientation, or gender identity.            Thank you!     Thank you for choosing [facility]UROLOGY  for your care. Our goal is always to provide you with excellent care. Hearing back from our patients is one way we can continue to improve our services. Please take a few minutes to complete the written survey that you may receive in the mail after your visit with us. Thank you!             Your Updated Medication List - Protect others around you: Learn how to safely use, store and throw away your medicines at www.disposemymeds.org.          This list is accurate as of: 1/19/18  5:01 PM.  Always use your most recent med list.                   Brand Name Dispense Instructions for use Diagnosis    Cholecalciferol 4000 UNITS Caps      Take 1 capsule by mouth        cyanocobalamin 1000 MCG tablet    vitamin  B-12     Take 1,000 mcg by mouth once a week    Sensory disturbance       MULTIVITAMIN & MINERAL PO      Take 1 tablet by mouth daily    Sensory disturbance       SUMAtriptan 100 MG tablet    IMITREX     TAKE 1 TABLET (100 MG TOTAL) BY MOUTH EVERY 2 (TWO) HOURS AS NEEDED FOR MIGRAINE.

## 2018-01-19 NOTE — NURSING NOTE
Chief Complaint   Patient presents with     RECHECK     UMP- SENSORY DISTURBANCES IN BLEs F/U     Anshul Myers, CMA

## 2018-01-22 LAB
IGA SERPL-MCNC: 198 MG/DL (ref 70–380)
IGG SERPL-MCNC: 793 MG/DL (ref 695–1620)
IGM SERPL-MCNC: 125 MG/DL (ref 60–265)
PROT PATTERN SERPL IFE-IMP: NORMAL

## 2018-01-26 ENCOUNTER — COMMUNICATION - HEALTHEAST (OUTPATIENT)
Dept: INTERNAL MEDICINE | Facility: CLINIC | Age: 53
End: 2018-01-26

## 2018-01-29 NOTE — PROGRESS NOTES
Service Date: 2018         Matt Sung MD   [facility]   [address]   [place]  [address]      RE: Hill Odom   MRN: [id number]   : 1965      Dear Matt:      This is in regard to followup on Hill Odom.  The patient returned today with a chief complaint of burning in her hands and feet with color change involving her feet.      The patient has noted a new symptom.  This is particularly prominent involving her feet.  She notes a burning sensation with redness of her feet.  She did develop herpes zoster involving the bottom of her foot.  She had pictures of it and it does appear to be typical.  She also had it on her buttock on the left.  The patient has noted that her feet do turn bright red with the pain and she said light touch is uncomfortable.  Her previous spine issues have improved to a marked degree under her physical therapist's directions, Ji Moncada at [facility].  She is pleased with him.  She did note that gabapentin has helped some of her joint discomfort.  She has continued under your direction.  The patient did have results of her most recent visit for me.  She reviewed your letter to her from 2017, and basically, she had a normal comprehensive metabolic profile as well as complete blood count.  Her B12 level was 438 pg.  Her TSH was 1.80 and A1c hemoglobin 5.4.      Neurologic examination revealed a pleasant woman.  Gait, station, cerebellar testing, muscle stretch reflexes, plantar stimulation, strength, as well as superficial and cortical sensory testing are unremarkable.  I felt full peripheral pulses.  She has some tenderness involving her metatarsal areas.      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      I did discuss with the patient the possibility that she could have erythromelalgia.  I recommend that she try aspirin to see if this helps.  She will have further blood work now including CRP, sedimentation  rate, and ELP for monoclonal protein.  I did encourage her to continue gabapentin as it may help this condition.  I told her this was a poorly understood condition but did recommend that she see an autonomic specialist, Dr. Downs who is a neurologist at [facility].  I told her I would be happy to see her in followup if needed.      I spent 25 minutes with the patient today.  Over 50% of the time did involve counseling and coordination of care.       Thank you for allowing me to see this patient.      Sincerely yours,      MD LYNNE Schwab MD             D: 2018   T: 2018   MT: dany      Name:     SUZAN KENT   MRN:      [id number]        Account:      [id number]   :      1965           Service Date: 2018      Document: [id number]

## 2018-01-31 ENCOUNTER — CARE COORDINATION (OUTPATIENT)
Dept: NEUROLOGY | Facility: CLINIC | Age: 53
End: 2018-01-31

## 2018-01-31 NOTE — PROGRESS NOTES
Angi Carpenter RN Seeb, Tracey, RN                   Fax referral and clinic notes to [facility] Dr. Downs once notes are signed by Dr. Henderson.  [phone].            Previous Messages       ----- Message -----      From: Roxanna Orellana      Sent: 1/19/2018   5:03 PM        To: Angi Carpenter RN   Subject: Neuro Referral                                   Hi Dr. Santiago Whitley placed a neuro referral for this patient to see Dr. Quintero at [facility]. Would you be able to send orders there?     Roxanna Magaña            1/31/18: per [employer] message. Referral needs to come from patient's primary MD. Patient has already contacted that office.

## 2018-02-01 ENCOUNTER — RECORDS - HEALTHEAST (OUTPATIENT)
Dept: ADMINISTRATIVE | Facility: OTHER | Age: 53
End: 2018-02-01

## 2018-02-01 ENCOUNTER — COMMUNICATION - HEALTHEAST (OUTPATIENT)
Dept: INTERNAL MEDICINE | Facility: CLINIC | Age: 53
End: 2018-02-01

## 2018-02-01 DIAGNOSIS — I73.81 ERYTHROMELALGIA (H): ICD-10-CM

## 2018-02-07 ENCOUNTER — COMMUNICATION - HEALTHEAST (OUTPATIENT)
Dept: INTERNAL MEDICINE | Facility: CLINIC | Age: 53
End: 2018-02-07

## 2018-02-12 ENCOUNTER — COMMUNICATION - HEALTHEAST (OUTPATIENT)
Dept: INTERNAL MEDICINE | Facility: CLINIC | Age: 53
End: 2018-02-12

## 2018-02-20 ENCOUNTER — RECORDS - HEALTHEAST (OUTPATIENT)
Dept: ADMINISTRATIVE | Facility: OTHER | Age: 53
End: 2018-02-20

## 2018-02-23 ENCOUNTER — AMBULATORY - HEALTHEAST (OUTPATIENT)
Dept: INTERNAL MEDICINE | Facility: CLINIC | Age: 53
End: 2018-02-23

## 2018-02-26 ENCOUNTER — OFFICE VISIT - HEALTHEAST (OUTPATIENT)
Dept: INTERNAL MEDICINE | Facility: CLINIC | Age: 53
End: 2018-02-26

## 2018-02-26 DIAGNOSIS — I73.81 ERYTHROMELALGIA (H): ICD-10-CM

## 2018-02-26 DIAGNOSIS — R10.9 RIGHT FLANK PAIN, CHRONIC: ICD-10-CM

## 2018-02-26 DIAGNOSIS — Z00.00 ROUTINE GENERAL MEDICAL EXAMINATION AT A HEALTH CARE FACILITY: ICD-10-CM

## 2018-02-26 DIAGNOSIS — G43.909 MIGRAINE: ICD-10-CM

## 2018-02-26 DIAGNOSIS — E78.5 HYPERLIPIDEMIA, UNSPECIFIED HYPERLIPIDEMIA TYPE: ICD-10-CM

## 2018-02-26 DIAGNOSIS — G89.29 RIGHT FLANK PAIN, CHRONIC: ICD-10-CM

## 2018-02-26 DIAGNOSIS — R20.9 SENSORY DISTURBANCE: ICD-10-CM

## 2018-02-26 LAB
ALBUMIN SERPL-MCNC: 3.6 G/DL (ref 3.5–5)
ALBUMIN UR-MCNC: NEGATIVE MG/DL
ALP SERPL-CCNC: 98 U/L (ref 45–120)
ALT SERPL W P-5'-P-CCNC: 14 U/L (ref 0–45)
ANION GAP SERPL CALCULATED.3IONS-SCNC: 10 MMOL/L (ref 5–18)
APPEARANCE UR: CLEAR
AST SERPL W P-5'-P-CCNC: 16 U/L (ref 0–40)
BACTERIA #/AREA URNS HPF: ABNORMAL HPF
BILIRUB SERPL-MCNC: 0.5 MG/DL (ref 0–1)
BILIRUB UR QL STRIP: NEGATIVE
BUN SERPL-MCNC: 15 MG/DL (ref 8–22)
CALCIUM SERPL-MCNC: 9.2 MG/DL (ref 8.5–10.5)
CHLORIDE BLD-SCNC: 107 MMOL/L (ref 98–107)
CHOLEST SERPL-MCNC: 246 MG/DL
CO2 SERPL-SCNC: 22 MMOL/L (ref 22–31)
COLOR UR AUTO: YELLOW
CREAT SERPL-MCNC: 0.74 MG/DL (ref 0.6–1.1)
ERYTHROCYTE [DISTWIDTH] IN BLOOD BY AUTOMATED COUNT: 11.8 % (ref 11–14.5)
FASTING STATUS PATIENT QL REPORTED: YES
GFR SERPL CREATININE-BSD FRML MDRD: >60 ML/MIN/1.73M2
GLUCOSE BLD-MCNC: 87 MG/DL (ref 70–125)
GLUCOSE UR STRIP-MCNC: NEGATIVE MG/DL
HCT VFR BLD AUTO: 38.9 % (ref 35–47)
HDLC SERPL-MCNC: 58 MG/DL
HGB BLD-MCNC: 12.8 G/DL (ref 12–16)
HGB UR QL STRIP: ABNORMAL
KETONES UR STRIP-MCNC: NEGATIVE MG/DL
LDLC SERPL CALC-MCNC: 175 MG/DL
LEUKOCYTE ESTERASE UR QL STRIP: NEGATIVE
MCH RBC QN AUTO: 28.9 PG (ref 27–34)
MCHC RBC AUTO-ENTMCNC: 33 G/DL (ref 32–36)
MCV RBC AUTO: 87 FL (ref 80–100)
NITRATE UR QL: NEGATIVE
PH UR STRIP: 6 [PH] (ref 5–8)
PLATELET # BLD AUTO: 325 THOU/UL (ref 140–440)
PMV BLD AUTO: 7.8 FL (ref 7–10)
POTASSIUM BLD-SCNC: 4.2 MMOL/L (ref 3.5–5)
PROT SERPL-MCNC: 7 G/DL (ref 6–8)
RBC # BLD AUTO: 4.45 MILL/UL (ref 3.8–5.4)
RBC #/AREA URNS AUTO: ABNORMAL HPF
SODIUM SERPL-SCNC: 139 MMOL/L (ref 136–145)
SP GR UR STRIP: 1.01 (ref 1–1.03)
SQUAMOUS #/AREA URNS AUTO: ABNORMAL LPF
TRIGL SERPL-MCNC: 66 MG/DL
UROBILINOGEN UR STRIP-ACNC: ABNORMAL
WBC #/AREA URNS AUTO: ABNORMAL HPF
WBC: 5.3 THOU/UL (ref 4–11)

## 2018-02-26 ASSESSMENT — MIFFLIN-ST. JEOR: SCORE: 1341.11

## 2018-02-27 ENCOUNTER — RECORDS - HEALTHEAST (OUTPATIENT)
Dept: ADMINISTRATIVE | Facility: OTHER | Age: 53
End: 2018-02-27

## 2018-02-27 ENCOUNTER — COMMUNICATION - HEALTHEAST (OUTPATIENT)
Dept: INTERNAL MEDICINE | Facility: CLINIC | Age: 53
End: 2018-02-27

## 2018-02-28 ENCOUNTER — RECORDS - HEALTHEAST (OUTPATIENT)
Dept: ADMINISTRATIVE | Facility: OTHER | Age: 53
End: 2018-02-28

## 2018-03-01 ENCOUNTER — RECORDS - HEALTHEAST (OUTPATIENT)
Dept: ADMINISTRATIVE | Facility: OTHER | Age: 53
End: 2018-03-01

## 2018-03-05 ENCOUNTER — OFFICE VISIT - HEALTHEAST (OUTPATIENT)
Dept: PODIATRY | Facility: CLINIC | Age: 53
End: 2018-03-05

## 2018-03-05 DIAGNOSIS — L57.0 KERATOMA: ICD-10-CM

## 2018-03-14 ENCOUNTER — RECORDS - HEALTHEAST (OUTPATIENT)
Dept: ADMINISTRATIVE | Facility: OTHER | Age: 53
End: 2018-03-14

## 2018-03-15 ENCOUNTER — COMMUNICATION - HEALTHEAST (OUTPATIENT)
Dept: INTERNAL MEDICINE | Facility: CLINIC | Age: 53
End: 2018-03-15

## 2018-03-19 ENCOUNTER — AMBULATORY - HEALTHEAST (OUTPATIENT)
Dept: INTERNAL MEDICINE | Facility: CLINIC | Age: 53
End: 2018-03-19

## 2018-03-19 ENCOUNTER — OFFICE VISIT - HEALTHEAST (OUTPATIENT)
Dept: INTERNAL MEDICINE | Facility: CLINIC | Age: 53
End: 2018-03-19

## 2018-03-19 DIAGNOSIS — R10.9 RIGHT FLANK PAIN: ICD-10-CM

## 2018-03-19 DIAGNOSIS — M79.2 NEUROPATHIC PAIN: ICD-10-CM

## 2018-03-19 DIAGNOSIS — N95.1 PERIMENOPAUSAL: ICD-10-CM

## 2018-03-19 DIAGNOSIS — I73.81 ERYTHROMELALGIA (H): ICD-10-CM

## 2018-03-19 ASSESSMENT — MIFFLIN-ST. JEOR: SCORE: 1354.72

## 2018-03-20 ENCOUNTER — RECORDS - HEALTHEAST (OUTPATIENT)
Dept: ADMINISTRATIVE | Facility: OTHER | Age: 53
End: 2018-03-20

## 2018-03-20 ENCOUNTER — COMMUNICATION - HEALTHEAST (OUTPATIENT)
Dept: INTERNAL MEDICINE | Facility: CLINIC | Age: 53
End: 2018-03-20

## 2018-03-20 LAB — FSH SERPL-ACNC: 65.3 MIU/ML

## 2018-03-30 ENCOUNTER — RECORDS - HEALTHEAST (OUTPATIENT)
Dept: ADMINISTRATIVE | Facility: OTHER | Age: 53
End: 2018-03-30

## 2018-04-03 ENCOUNTER — COMMUNICATION - HEALTHEAST (OUTPATIENT)
Dept: INTERNAL MEDICINE | Facility: CLINIC | Age: 53
End: 2018-04-03

## 2018-04-12 ENCOUNTER — COMMUNICATION - HEALTHEAST (OUTPATIENT)
Dept: INTERNAL MEDICINE | Facility: CLINIC | Age: 53
End: 2018-04-12

## 2018-04-13 ENCOUNTER — HOSPITAL ENCOUNTER (OUTPATIENT)
Dept: CT IMAGING | Facility: CLINIC | Age: 53
Discharge: HOME | End: 2018-04-13
Attending: INTERNAL MEDICINE

## 2018-04-13 ENCOUNTER — OFFICE VISIT - HEALTHEAST (OUTPATIENT)
Dept: INTERNAL MEDICINE | Facility: CLINIC | Age: 53
End: 2018-04-13

## 2018-04-13 DIAGNOSIS — R10.9 RIGHT SIDED ABDOMINAL PAIN: ICD-10-CM

## 2018-04-13 LAB
ALBUMIN SERPL-MCNC: 3.7 G/DL (ref 3.5–5)
ALBUMIN UR-MCNC: NEGATIVE MG/DL
ALP SERPL-CCNC: 95 U/L (ref 45–120)
ALT SERPL W P-5'-P-CCNC: 20 U/L (ref 0–45)
ANION GAP SERPL CALCULATED.3IONS-SCNC: 9 MMOL/L (ref 5–18)
APPEARANCE UR: CLEAR
AST SERPL W P-5'-P-CCNC: 20 U/L (ref 0–40)
BACTERIA #/AREA URNS HPF: ABNORMAL HPF
BILIRUB SERPL-MCNC: 0.6 MG/DL (ref 0–1)
BILIRUB UR QL STRIP: NEGATIVE
BUN SERPL-MCNC: 16 MG/DL (ref 8–22)
CALCIUM SERPL-MCNC: 9.5 MG/DL (ref 8.5–10.5)
CHLORIDE BLD-SCNC: 106 MMOL/L (ref 98–107)
CO2 SERPL-SCNC: 26 MMOL/L (ref 22–31)
COLOR UR AUTO: YELLOW
CREAT SERPL-MCNC: 0.84 MG/DL (ref 0.6–1.1)
ERYTHROCYTE [DISTWIDTH] IN BLOOD BY AUTOMATED COUNT: 13 % (ref 11–14.5)
GFR SERPL CREATININE-BSD FRML MDRD: >60 ML/MIN/1.73M2
GLUCOSE BLD-MCNC: 122 MG/DL (ref 70–125)
GLUCOSE UR STRIP-MCNC: NEGATIVE MG/DL
HCT VFR BLD AUTO: 38.1 % (ref 35–47)
HGB BLD-MCNC: 12.7 G/DL (ref 12–16)
HGB UR QL STRIP: NEGATIVE
KETONES UR STRIP-MCNC: NEGATIVE MG/DL
LEUKOCYTE ESTERASE UR QL STRIP: ABNORMAL
MCH RBC QN AUTO: 28.9 PG (ref 27–34)
MCHC RBC AUTO-ENTMCNC: 33.4 G/DL (ref 32–36)
MCV RBC AUTO: 86 FL (ref 80–100)
NITRATE UR QL: NEGATIVE
PH UR STRIP: 5.5 [PH] (ref 5–8)
PLATELET # BLD AUTO: 316 THOU/UL (ref 140–440)
PMV BLD AUTO: 7.8 FL (ref 7–10)
POTASSIUM BLD-SCNC: 4.5 MMOL/L (ref 3.5–5)
PROT SERPL-MCNC: 6.6 G/DL (ref 6–8)
RBC # BLD AUTO: 4.41 MILL/UL (ref 3.8–5.4)
RBC #/AREA URNS AUTO: ABNORMAL HPF
SODIUM SERPL-SCNC: 141 MMOL/L (ref 136–145)
SP GR UR STRIP: <=1.005 (ref 1–1.03)
SQUAMOUS #/AREA URNS AUTO: ABNORMAL LPF
UROBILINOGEN UR STRIP-ACNC: ABNORMAL
WBC #/AREA URNS AUTO: ABNORMAL HPF
WBC: 6.6 THOU/UL (ref 4–11)

## 2018-04-13 ASSESSMENT — MIFFLIN-ST. JEOR: SCORE: 1345.65

## 2018-04-14 LAB — BACTERIA SPEC CULT: NO GROWTH

## 2018-04-25 ENCOUNTER — COMMUNICATION - HEALTHEAST (OUTPATIENT)
Dept: INTERNAL MEDICINE | Facility: CLINIC | Age: 53
End: 2018-04-25

## 2018-06-02 ENCOUNTER — COMMUNICATION - HEALTHEAST (OUTPATIENT)
Dept: INTERNAL MEDICINE | Facility: CLINIC | Age: 53
End: 2018-06-02

## 2018-08-09 ENCOUNTER — COMMUNICATION - HEALTHEAST (OUTPATIENT)
Dept: INTERNAL MEDICINE | Facility: CLINIC | Age: 53
End: 2018-08-09

## 2018-08-20 ENCOUNTER — OFFICE VISIT - HEALTHEAST (OUTPATIENT)
Dept: PODIATRY | Facility: CLINIC | Age: 53
End: 2018-08-20

## 2018-08-20 DIAGNOSIS — L57.0 KERATOMA: ICD-10-CM

## 2018-08-28 ENCOUNTER — OFFICE VISIT - HEALTHEAST (OUTPATIENT)
Dept: INTERNAL MEDICINE | Facility: CLINIC | Age: 53
End: 2018-08-28

## 2018-08-28 ENCOUNTER — COMMUNICATION - HEALTHEAST (OUTPATIENT)
Dept: INTERNAL MEDICINE | Facility: CLINIC | Age: 53
End: 2018-08-28

## 2018-08-28 DIAGNOSIS — R63.5 ABNORMAL WEIGHT GAIN: ICD-10-CM

## 2018-08-28 DIAGNOSIS — E78.00 HYPERCHOLESTEREMIA: ICD-10-CM

## 2018-08-28 DIAGNOSIS — R20.9 SENSORY DISTURBANCE: ICD-10-CM

## 2018-08-28 DIAGNOSIS — M79.2 NEUROPATHIC PAIN: ICD-10-CM

## 2018-08-28 DIAGNOSIS — Z86.19 HISTORY OF HERPES ZOSTER: ICD-10-CM

## 2018-08-28 DIAGNOSIS — H91.91 HEARING LOSS OF RIGHT EAR, UNSPECIFIED HEARING LOSS TYPE: ICD-10-CM

## 2018-08-28 LAB — TSH SERPL DL<=0.005 MIU/L-ACNC: 0.8 UIU/ML (ref 0.3–5)

## 2018-08-28 ASSESSMENT — MIFFLIN-ST. JEOR: SCORE: 1372.87

## 2018-10-02 ENCOUNTER — COMMUNICATION - HEALTHEAST (OUTPATIENT)
Dept: INTERNAL MEDICINE | Facility: CLINIC | Age: 53
End: 2018-10-02

## 2018-10-02 ENCOUNTER — HOSPITAL ENCOUNTER (OUTPATIENT)
Dept: MAMMOGRAPHY | Facility: CLINIC | Age: 53
Discharge: HOME | End: 2018-10-02
Attending: INTERNAL MEDICINE

## 2018-10-02 DIAGNOSIS — Z12.31 VISIT FOR SCREENING MAMMOGRAM: ICD-10-CM

## 2018-10-08 ENCOUNTER — COMMUNICATION - HEALTHEAST (OUTPATIENT)
Dept: INTERNAL MEDICINE | Facility: CLINIC | Age: 53
End: 2018-10-08

## 2018-10-21 ENCOUNTER — COMMUNICATION - HEALTHEAST (OUTPATIENT)
Dept: INTERNAL MEDICINE | Facility: CLINIC | Age: 53
End: 2018-10-21

## 2018-10-21 ENCOUNTER — OFFICE VISIT - HEALTHEAST (OUTPATIENT)
Dept: FAMILY MEDICINE | Facility: CLINIC | Age: 53
End: 2018-10-21

## 2018-10-21 DIAGNOSIS — M25.50 ARTHRALGIA, UNSPECIFIED JOINT: ICD-10-CM

## 2018-10-21 LAB
BASOPHILS # BLD AUTO: 0 THOU/UL (ref 0–0.2)
BASOPHILS NFR BLD AUTO: 0 % (ref 0–2)
C REACTIVE PROTEIN LHE: 0.2 MG/DL (ref 0–0.8)
EOSINOPHIL # BLD AUTO: 0.2 THOU/UL (ref 0–0.4)
EOSINOPHIL NFR BLD AUTO: 4 % (ref 0–6)
ERYTHROCYTE [DISTWIDTH] IN BLOOD BY AUTOMATED COUNT: 11.1 % (ref 11–14.5)
ERYTHROCYTE [SEDIMENTATION RATE] IN BLOOD BY WESTERGREN METHOD: 11 MM/HR (ref 0–20)
HCT VFR BLD AUTO: 38.8 % (ref 35–47)
HGB BLD-MCNC: 13.3 G/DL (ref 12–16)
LYMPHOCYTES # BLD AUTO: 1.7 THOU/UL (ref 0.8–4.4)
LYMPHOCYTES NFR BLD AUTO: 28 % (ref 20–40)
MCH RBC QN AUTO: 28.9 PG (ref 27–34)
MCHC RBC AUTO-ENTMCNC: 34.2 G/DL (ref 32–36)
MCV RBC AUTO: 85 FL (ref 80–100)
MONOCYTES # BLD AUTO: 0.4 THOU/UL (ref 0–0.9)
MONOCYTES NFR BLD AUTO: 6 % (ref 2–10)
NEUTROPHILS # BLD AUTO: 3.7 THOU/UL (ref 2–7.7)
NEUTROPHILS NFR BLD AUTO: 62 % (ref 50–70)
PLATELET # BLD AUTO: 353 THOU/UL (ref 140–440)
PMV BLD AUTO: 7.8 FL (ref 7–10)
RBC # BLD AUTO: 4.59 MILL/UL (ref 3.8–5.4)
RHEUMATOID FACT SERPL-ACNC: <15 IU/ML (ref 0–30)
WBC: 6 THOU/UL (ref 4–11)

## 2018-10-22 ENCOUNTER — AMBULATORY - HEALTHEAST (OUTPATIENT)
Dept: INTERNAL MEDICINE | Facility: CLINIC | Age: 53
End: 2018-10-22

## 2018-10-22 DIAGNOSIS — M79.2 NEUROPATHIC PAIN: ICD-10-CM

## 2018-10-22 LAB
ANA SER QL: 0.3 U
B BURGDOR IGG+IGM SER QL: <0.01 INDEX VALUE

## 2018-10-23 ENCOUNTER — AMBULATORY - HEALTHEAST (OUTPATIENT)
Dept: INTERNAL MEDICINE | Facility: CLINIC | Age: 53
End: 2018-10-23

## 2018-10-23 DIAGNOSIS — M25.562 ARTHRALGIA OF BOTH KNEES: ICD-10-CM

## 2018-10-23 DIAGNOSIS — M25.561 ARTHRALGIA OF BOTH KNEES: ICD-10-CM

## 2018-10-23 LAB — CCP AB SER IA-ACNC: <0.5 U/ML

## 2018-10-27 ENCOUNTER — AMBULATORY - HEALTHEAST (OUTPATIENT)
Dept: NURSING | Facility: CLINIC | Age: 53
End: 2018-10-27

## 2018-11-01 ENCOUNTER — COMMUNICATION - HEALTHEAST (OUTPATIENT)
Dept: INTERNAL MEDICINE | Facility: CLINIC | Age: 53
End: 2018-11-01

## 2018-11-13 ENCOUNTER — OFFICE VISIT - HEALTHEAST (OUTPATIENT)
Dept: PODIATRY | Facility: CLINIC | Age: 53
End: 2018-11-13

## 2018-11-13 DIAGNOSIS — L57.0 KERATOMA: ICD-10-CM

## 2018-11-20 ENCOUNTER — OFFICE VISIT - HEALTHEAST (OUTPATIENT)
Dept: RHEUMATOLOGY | Facility: CLINIC | Age: 53
End: 2018-11-20

## 2018-11-20 DIAGNOSIS — M17.0 PRIMARY OSTEOARTHRITIS OF BOTH KNEES: ICD-10-CM

## 2018-11-20 DIAGNOSIS — M70.62 TROCHANTERIC BURSITIS OF BOTH HIPS: ICD-10-CM

## 2018-11-20 DIAGNOSIS — M70.61 TROCHANTERIC BURSITIS OF BOTH HIPS: ICD-10-CM

## 2018-11-20 DIAGNOSIS — M25.50 POLYARTHRALGIA: ICD-10-CM

## 2018-11-20 ASSESSMENT — MIFFLIN-ST. JEOR: SCORE: 1327.51

## 2019-01-08 ENCOUNTER — OFFICE VISIT - HEALTHEAST (OUTPATIENT)
Dept: PODIATRY | Facility: CLINIC | Age: 54
End: 2019-01-08

## 2019-01-08 DIAGNOSIS — L57.0 KERATOMA: ICD-10-CM

## 2019-01-08 ASSESSMENT — MIFFLIN-ST. JEOR: SCORE: 1327.51

## 2019-02-06 ENCOUNTER — OFFICE VISIT - HEALTHEAST (OUTPATIENT)
Dept: PODIATRY | Facility: CLINIC | Age: 54
End: 2019-02-06

## 2019-02-06 DIAGNOSIS — L57.0 KERATOMA: ICD-10-CM

## 2019-02-06 ASSESSMENT — MIFFLIN-ST. JEOR: SCORE: 1327.51

## 2019-02-28 ENCOUNTER — OFFICE VISIT - HEALTHEAST (OUTPATIENT)
Dept: INTERNAL MEDICINE | Facility: CLINIC | Age: 54
End: 2019-02-28

## 2019-02-28 DIAGNOSIS — M85.80 LOW BONE MASS: ICD-10-CM

## 2019-02-28 DIAGNOSIS — L91.8 INFLAMED SKIN TAG: ICD-10-CM

## 2019-02-28 DIAGNOSIS — Z00.00 ROUTINE GENERAL MEDICAL EXAMINATION AT A HEALTH CARE FACILITY: ICD-10-CM

## 2019-02-28 DIAGNOSIS — L71.9 ROSACEA: ICD-10-CM

## 2019-02-28 DIAGNOSIS — G43.809 OTHER MIGRAINE WITHOUT STATUS MIGRAINOSUS, NOT INTRACTABLE: ICD-10-CM

## 2019-02-28 DIAGNOSIS — E53.8 VITAMIN B12 DEFICIENCY (NON ANEMIC): ICD-10-CM

## 2019-02-28 DIAGNOSIS — E78.5 HYPERLIPIDEMIA, UNSPECIFIED HYPERLIPIDEMIA TYPE: ICD-10-CM

## 2019-02-28 LAB
ALBUMIN SERPL-MCNC: 3.7 G/DL (ref 3.5–5)
ALBUMIN UR-MCNC: NEGATIVE MG/DL
ALP SERPL-CCNC: 98 U/L (ref 45–120)
ALT SERPL W P-5'-P-CCNC: 15 U/L (ref 0–45)
ANION GAP SERPL CALCULATED.3IONS-SCNC: 9 MMOL/L (ref 5–18)
APPEARANCE UR: CLEAR
AST SERPL W P-5'-P-CCNC: 17 U/L (ref 0–40)
BACTERIA #/AREA URNS HPF: ABNORMAL HPF
BILIRUB SERPL-MCNC: 0.6 MG/DL (ref 0–1)
BILIRUB UR QL STRIP: NEGATIVE
BUN SERPL-MCNC: 14 MG/DL (ref 8–22)
CALCIUM SERPL-MCNC: 9.3 MG/DL (ref 8.5–10.5)
CAOX CRY #/AREA URNS HPF: PRESENT /[HPF]
CHLORIDE BLD-SCNC: 105 MMOL/L (ref 98–107)
CHOLEST SERPL-MCNC: 247 MG/DL
CO2 SERPL-SCNC: 26 MMOL/L (ref 22–31)
COLOR UR AUTO: YELLOW
CREAT SERPL-MCNC: 0.81 MG/DL (ref 0.6–1.1)
ERYTHROCYTE [DISTWIDTH] IN BLOOD BY AUTOMATED COUNT: 13.9 % (ref 11–14.5)
FASTING STATUS PATIENT QL REPORTED: YES
GFR SERPL CREATININE-BSD FRML MDRD: >60 ML/MIN/1.73M2
GLUCOSE BLD-MCNC: 73 MG/DL (ref 70–125)
GLUCOSE UR STRIP-MCNC: NEGATIVE MG/DL
HCT VFR BLD AUTO: 38.5 % (ref 35–47)
HDLC SERPL-MCNC: 62 MG/DL
HGB BLD-MCNC: 13.1 G/DL (ref 12–16)
HGB UR QL STRIP: ABNORMAL
KETONES UR STRIP-MCNC: NEGATIVE MG/DL
LDLC SERPL CALC-MCNC: 172 MG/DL
LEUKOCYTE ESTERASE UR QL STRIP: NEGATIVE
MCH RBC QN AUTO: 28.6 PG (ref 27–34)
MCHC RBC AUTO-ENTMCNC: 34.1 G/DL (ref 32–36)
MCV RBC AUTO: 84 FL (ref 80–100)
MUCOUS THREADS #/AREA URNS LPF: ABNORMAL LPF
NITRATE UR QL: NEGATIVE
PH UR STRIP: 5.5 [PH] (ref 5–8)
PLATELET # BLD AUTO: 319 THOU/UL (ref 140–440)
PMV BLD AUTO: 7.8 FL (ref 7–10)
POTASSIUM BLD-SCNC: 3.9 MMOL/L (ref 3.5–5)
PROT SERPL-MCNC: 6.9 G/DL (ref 6–8)
RBC # BLD AUTO: 4.59 MILL/UL (ref 3.8–5.4)
RBC #/AREA URNS AUTO: ABNORMAL HPF
SODIUM SERPL-SCNC: 140 MMOL/L (ref 136–145)
SP GR UR STRIP: 1.02 (ref 1–1.03)
SQUAMOUS #/AREA URNS AUTO: ABNORMAL LPF
TRIGL SERPL-MCNC: 65 MG/DL
UROBILINOGEN UR STRIP-ACNC: ABNORMAL
VIT B12 SERPL-MCNC: 578 PG/ML (ref 213–816)
WBC #/AREA URNS AUTO: ABNORMAL HPF
WBC: 5.8 THOU/UL (ref 4–11)

## 2019-02-28 ASSESSMENT — MIFFLIN-ST. JEOR: SCORE: 1304.83

## 2019-03-01 LAB
25(OH)D3 SERPL-MCNC: 36.6 NG/ML (ref 30–80)
25(OH)D3 SERPL-MCNC: 36.6 NG/ML (ref 30–80)

## 2019-03-05 ENCOUNTER — COMMUNICATION - HEALTHEAST (OUTPATIENT)
Dept: INTERNAL MEDICINE | Facility: CLINIC | Age: 54
End: 2019-03-05

## 2019-03-12 ENCOUNTER — OFFICE VISIT - HEALTHEAST (OUTPATIENT)
Dept: RHEUMATOLOGY | Facility: CLINIC | Age: 54
End: 2019-03-12

## 2019-03-12 DIAGNOSIS — M70.62 TROCHANTERIC BURSITIS OF BOTH HIPS: ICD-10-CM

## 2019-03-12 DIAGNOSIS — M70.61 TROCHANTERIC BURSITIS OF BOTH HIPS: ICD-10-CM

## 2019-03-13 ENCOUNTER — RECORDS - HEALTHEAST (OUTPATIENT)
Dept: ADMINISTRATIVE | Facility: OTHER | Age: 54
End: 2019-03-13

## 2019-03-21 ENCOUNTER — COMMUNICATION - HEALTHEAST (OUTPATIENT)
Dept: INTERNAL MEDICINE | Facility: CLINIC | Age: 54
End: 2019-03-21

## 2019-04-03 ENCOUNTER — OFFICE VISIT - HEALTHEAST (OUTPATIENT)
Dept: PODIATRY | Facility: CLINIC | Age: 54
End: 2019-04-03

## 2019-04-03 DIAGNOSIS — L57.0 KERATOMA: ICD-10-CM

## 2019-04-03 ASSESSMENT — MIFFLIN-ST. JEOR: SCORE: 1304.83

## 2019-04-05 ENCOUNTER — COMMUNICATION - HEALTHEAST (OUTPATIENT)
Dept: INTERNAL MEDICINE | Facility: CLINIC | Age: 54
End: 2019-04-05

## 2019-04-12 ENCOUNTER — AMBULATORY - HEALTHEAST (OUTPATIENT)
Dept: INTERNAL MEDICINE | Facility: CLINIC | Age: 54
End: 2019-04-12

## 2019-04-12 DIAGNOSIS — L84 CALLUS OF FOOT: ICD-10-CM

## 2019-04-15 ENCOUNTER — COMMUNICATION - HEALTHEAST (OUTPATIENT)
Dept: INTERNAL MEDICINE | Facility: CLINIC | Age: 54
End: 2019-04-15

## 2019-04-25 ENCOUNTER — COMMUNICATION - HEALTHEAST (OUTPATIENT)
Dept: INTERNAL MEDICINE | Facility: CLINIC | Age: 54
End: 2019-04-25

## 2019-05-01 ENCOUNTER — AMBULATORY - HEALTHEAST (OUTPATIENT)
Dept: INTERNAL MEDICINE | Facility: CLINIC | Age: 54
End: 2019-05-01

## 2019-05-01 ENCOUNTER — COMMUNICATION - HEALTHEAST (OUTPATIENT)
Dept: INTERNAL MEDICINE | Facility: CLINIC | Age: 54
End: 2019-05-01

## 2019-05-01 DIAGNOSIS — L84 CALLUS OF FOOT: ICD-10-CM

## 2019-05-06 ENCOUNTER — RECORDS - HEALTHEAST (OUTPATIENT)
Dept: ADMINISTRATIVE | Facility: OTHER | Age: 54
End: 2019-05-06

## 2019-05-06 ENCOUNTER — OFFICE VISIT (OUTPATIENT)
Dept: PODIATRY | Facility: CLINIC | Age: 54
End: 2019-05-06
Payer: COMMERCIAL

## 2019-05-06 VITALS
SYSTOLIC BLOOD PRESSURE: 110 MMHG | BODY MASS INDEX: 26.66 KG/M2 | HEIGHT: 65 IN | WEIGHT: 160 LBS | DIASTOLIC BLOOD PRESSURE: 66 MMHG

## 2019-05-06 DIAGNOSIS — L84 CALLUS: Primary | ICD-10-CM

## 2019-05-06 PROCEDURE — 11056 PARNG/CUTG B9 HYPRKR LES 2-4: CPT | Performed by: PODIATRIST

## 2019-05-06 PROCEDURE — 99203 OFFICE O/P NEW LOW 30 MIN: CPT | Mod: 25 | Performed by: PODIATRIST

## 2019-05-06 RX ORDER — AMMONIUM LACTATE 12 G/100G
CREAM TOPICAL 2 TIMES DAILY
Qty: 385 G | Refills: 1 | Status: SHIPPED | OUTPATIENT
Start: 2019-05-06 | End: 2022-03-29

## 2019-05-06 ASSESSMENT — MIFFLIN-ST. JEOR: SCORE: 1321.64

## 2019-05-06 NOTE — PATIENT INSTRUCTIONS
Thank you for choosing [facility]!    DR. TORRE'S CLINIC LOCATIONS:   MONDAY - [place] TUESDAY - [place]   [address] [facility]  [address]   [place] [address] [place] [address]   [phone]       THURSDAY AM - [place] THURSDAY PM - [place]   [address]   [place] [address] [place] [address]   [phone]       FRIDAY AM - [place] SET UP SURGERY: [phone] [address] APPOINTMENTS: [phone]   [place] [address] BILLING QUESTIONS: [phone] FAX NUMBER: [phone]     Follow Up: as needed    CALLUS / CORN / IPK CARE  When there is excessive friction or pressure on the skin, the body responds by making the skin thicker to protect the deeper structures from becoming exposed. While this works well to protect the deeper structures, the thickened skin can cause increased pressure and pain.    Callus: flat, diffuse thickened areas are simple calluses and they are usually caused by friction. Often these are the result of rubbing on a shoe or from going barefoot.    Corns: calluses with a central core on or between the toes are called corns. These result from prominent joints on toes rubbing together. Theses are a symptom of bone malalignment or illfitting shoes and will always recur unless the underlying bones are addressed surgically.    IPK: calluses with a central core on the ball of the foot are usually IPKs (intractable plantar keratosis). These are caused by excessive pressure from the metatarsals, the bones that make up the ball of the foot. Often one of these bones is too long or too prominent. Again, these will always recur unless the underlying bone issue is addressed. There is no cure for these. They will either go away by themselves, recur, or more could develop.    TREATMENT RECOMENDATIONS  - File: Trim them down with a pumice stone or callus file a  couple times a week to remove callus tissue that builds up. An electric callus removing device. Amope Pedi Perfect Electronic Pedicure Foot File and Callus Remover can be a good option.   - Moisturize: Lotion can be applied to soften the callus. A lactic acid or urea based cream such as Carmol, Kersal or Vanicream or thicker cream with shea butter are good options.   - Foot Gear: Good supportive shoes and minimizing barefoot walking can slow down callus formation and can decrease pain levels. Gel inserts can also provide padding to the bottom of the foot to prevent pain and slow recurrence. Toe spacers, toe covers, can custom orthotic inserts can be beneficial as well.  - Surgery: If there is a surgical pathology noted, such as a prominent bone, often this needs to be addressed surgically to minimize recurrence. However, sometimes the lesion simply migrates to another spot after surgery, so it is not a guaranteed cure.     BODY WEIGHT AND YOUR FEET  The following information is included in the after visit summary for all patients. Body weight can be a sensitive issue to discuss in clinic, but we think the following information is very important. Although we focus on the feet and ankles, we do support the overall health of our patients.     Many things can cause foot and ankle problems. Foot structure, activity level, foot mechanics and injuries are common causes of pain. One very important issue that often goes unmentioned, is body weight. Extra weight can cause increased stress on muscles, ligaments, bones and tendons. Sometimes just a few extra pounds is all it takes to put one over her/his threshold. Without reducing that stress, it can be difficult to alleviate pain. As Foot & Ankle specialists, our job is addressing the lower extremity problem and possible causes. Regarding extra body weight, we encourage patients to discuss diet and weight management plans with their primary care doctors. It is this team  approach that gives you the best opportunity for pain relief and getting you back on your feet.      [place] has a Comprehensive Weight Management Program. This program includes counseling, education, non-surgical and surgical approaches to weight loss. If you are interested in learning more either talk to you primary care provider or call [phone].

## 2019-05-06 NOTE — PROGRESS NOTES
"Foot & Ankle Surgery  May 6, 2019    CC: \"callouses\"    I was asked to see Hill Odom regarding the chief complaint by:  Dr. Sung    HPI:  Pt is a 53 year old female who presents with above complaint.  Bilateral callus issues x 10 years.  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  She has had painful callus issues for years, and was initially seeing a doctor before being referred to Dr Betancourt.  She was then referred to our department when told Dr Betancourt doesn't see calluses.  Pain 8/10 daily, worse with walking.  She has the calluses trimmed every 6-8 weeks at the doctor's office.  She was last seen by Dr Betancourt about 5 weeks ago.  The left foot callus is ok, but the R foot calluses are painful    ROS:   Pos for CC.  The patient denies current nausea, vomiting, chills, fevers, belly pain, calf pain, chest pain or SOB.  Complete remainder of ROS is otherwise neg.    VITALS:    Vitals:    05/06/19 0900   BP: 110/66   Weight: 72.6 kg (160 lb)   Height: 1.651 m (5' 5\")       PMH:  No past medical history on file.    SXHX:  No past surgical history on file.     MEDS:    Current Outpatient Medications   Medication     Cholecalciferol 4000 UNITS CAPS     cyanocobalamin (VITAMIN  B-12) 1000 MCG tablet     Multiple Vitamins-Minerals (MULTIVITAMIN & MINERAL PO)     SUMAtriptan (IMITREX) 100 MG tablet     No current facility-administered medications for this visit.        ALL:     Allergies   Allergen Reactions     Amoxicillin      Okay for penicillin---see allergy note from 1/4/16  hives     Amoxicillin-Pot Clavulanate      Need to avoid amoxicillin, had non-specific drug rash to amox---okay for penicillin, see allergy note 1/4/16     Augmentin Other (See Comments)     Ciprofloxacin Rash     Doxycycline Rash and Other (See Comments)     Itchy/rash on face, swollen in hands and feet     Iron Rash     Simvastatin Rash       FMH:  No family history on file.    SocHx:    Social History     Socioeconomic History     " Marital status:      Spouse name: Not on file     Number of children: Not on file     Years of education: Not on file     Highest education level: Not on file   Occupational History     Not on file   Social Needs     Financial resource strain: Not on file     Food insecurity:     Worry: Not on file     Inability: Not on file     Transportation needs:     Medical: Not on file     Non-medical: Not on file   Tobacco Use     Smoking status: Never Smoker     Smokeless tobacco: Never Used   Substance and Sexual Activity     Alcohol use: No     Drug use: No     Sexual activity: Not Currently   Lifestyle     Physical activity:     Days per week: Not on file     Minutes per session: Not on file     Stress: Not on file   Relationships     Social connections:     Talks on phone: Not on file     Gets together: Not on file     Attends Judaism service: Not on file     Active member of club or organization: Not on file     Attends meetings of clubs or organizations: Not on file     Relationship status: Not on file     Intimate partner violence:     Fear of current or ex partner: Not on file     Emotionally abused: Not on file     Physically abused: Not on file     Forced sexual activity: Not on file   Other Topics Concern     Not on file   Social History Narrative     Not on file           EXAMINATION:  Gen:   No apparent distress  Neuro:   A&Ox3, no deficits  Psych:    Answering questions appropriately for age and situation with normal affect  Head:    NCAT  Eye:    Visual scanning without deficit  Ear:    Response to auditory stimuli wnl  Lung:    Non-labored breathing on RA noted  Abd:    NTND per patient report  Lymph:    Neg for pitting/non-pitting edema BLE  Vasc:    Pulses palpable, CFT minimally delayed  Neuro:    Light touch sensation intact to all sensory nerve distributions without paresthesias  Derm:   Nucleated callus plantar-medial R hallux and plantar R forefoot.  L foot callusing minimal today  MSK:     ROM, strength wnl without limitation, no pain on palpation noted.  Calf:    Neg for redness, swelling or tenderness    Assessment:  53 year old female with painful calluses R foot x 2      Plan:  Discussed etiologies, anatomy and options  1.  Painful calluses R foot x 2  -We discussed that many calluses are caused by pressure or shearing forces on the skin, and these can often be treated sufficiently with shoes, inserts and local callus debridement.  Some calluses, however, can have a deep core, and while home treatments are helpful, occasional clinical debridement may be necessary.  In certain cases, surgical excision may be required if no other treatments have proven sufficient.  -Our home callus instruction handout was dispensed, detailing home therapies to minimize regrowth of the calluses.    -debrided with 15 blade without incident  -discussed surgical excision of the skin lesions as next option should deep debridement today and aggressive home therapies prove insufficient.  Briefly discussed   -Rx for Lac Hydrin      Follow up:  prn or sooner with acute issues      Patient's medical history was reviewed today    Body mass index is 26.63 kg/m .  Weight management plan: Patient was referred to their PCP to discuss a diet and exercise plan.        Alex Morales DPM FACFAS FACFAOM  Podiatric Foot & Ankle Surgeon  [facility]  [phone]

## 2019-05-06 NOTE — LETTER
"    5/6/2019         RE: Hill Odom  [address] [place] [address]        Dear Colleague,    Thank you for referring your patient, Hill Odom, to the [facility]. Please see a copy of my visit note below.    Foot & Ankle Surgery  May 6, 2019    CC: \"callouses\"    I was asked to see Hill Odom regarding the chief complaint by:  Dr. Sung    HPI:  Pt is a 53 year old female who presents with above complaint.  Bilateral callus issues x 10 years.  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  She has had painful callus issues for years, and was initially seeing a doctor before being referred to Dr Betancourt.  She was then referred to our department when told Dr Betancourt doesn't see calluses.  Pain 8/10 daily, worse with walking.  She has the calluses trimmed every 6-8 weeks at the doctor's office.  She was last seen by Dr Betancourt about 5 weeks ago.  The left foot callus is ok, but the R foot calluses are painful    ROS:   Pos for CC.  The patient denies current nausea, vomiting, chills, fevers, belly pain, calf pain, chest pain or SOB.  Complete remainder of ROS is otherwise neg.    VITALS:    Vitals:    05/06/19 0900   BP: 110/66   Weight: 72.6 kg (160 lb)   Height: 1.651 m (5' 5\")       PMH:  No past medical history on file.    SXHX:  No past surgical history on file.     MEDS:    Current Outpatient Medications   Medication     Cholecalciferol 4000 UNITS CAPS     cyanocobalamin (VITAMIN  B-12) 1000 MCG tablet     Multiple Vitamins-Minerals (MULTIVITAMIN & MINERAL PO)     SUMAtriptan (IMITREX) 100 MG tablet     No current facility-administered medications for this visit.        ALL:     Allergies   Allergen Reactions     Amoxicillin      Okay for penicillin---see allergy note from 1/4/16  hives     Amoxicillin-Pot Clavulanate      Need to avoid amoxicillin, had non-specific drug rash to amox---okay for penicillin, see allergy note 1/4/16     Augmentin Other (See Comments)     Ciprofloxacin " Rash     Doxycycline Rash and Other (See Comments)     Itchy/rash on face, swollen in hands and feet     Iron Rash     Simvastatin Rash       FMH:  No family history on file.    SocHx:    Social History     Socioeconomic History     Marital status:      Spouse name: Not on file     Number of children: Not on file     Years of education: Not on file     Highest education level: Not on file   Occupational History     Not on file   Social Needs     Financial resource strain: Not on file     Food insecurity:     Worry: Not on file     Inability: Not on file     Transportation needs:     Medical: Not on file     Non-medical: Not on file   Tobacco Use     Smoking status: Never Smoker     Smokeless tobacco: Never Used   Substance and Sexual Activity     Alcohol use: No     Drug use: No     Sexual activity: Not Currently   Lifestyle     Physical activity:     Days per week: Not on file     Minutes per session: Not on file     Stress: Not on file   Relationships     Social connections:     Talks on phone: Not on file     Gets together: Not on file     Attends Jainism service: Not on file     Active member of club or organization: Not on file     Attends meetings of clubs or organizations: Not on file     Relationship status: Not on file     Intimate partner violence:     Fear of current or ex partner: Not on file     Emotionally abused: Not on file     Physically abused: Not on file     Forced sexual activity: Not on file   Other Topics Concern     Not on file   Social History Narrative     Not on file           EXAMINATION:  Gen:   No apparent distress  Neuro:   A&Ox3, no deficits  Psych:    Answering questions appropriately for age and situation with normal affect  Head:    NCAT  Eye:    Visual scanning without deficit  Ear:    Response to auditory stimuli wnl  Lung:    Non-labored breathing on RA noted  Abd:    NTND per patient report  Lymph:    Neg for pitting/non-pitting edema BLE  Vasc:    Pulses palpable, CFT  minimally delayed  Neuro:    Light touch sensation intact to all sensory nerve distributions without paresthesias  Derm:   Nucleated callus plantar-medial R hallux and plantar R forefoot.  L foot callusing minimal today  MSK:    ROM, strength wnl without limitation, no pain on palpation noted.  Calf:    Neg for redness, swelling or tenderness    Assessment:  53 year old female with painful calluses R foot x 2      Plan:  Discussed etiologies, anatomy and options  1.  Painful calluses R foot x 2  -We discussed that many calluses are caused by pressure or shearing forces on the skin, and these can often be treated sufficiently with shoes, inserts and local callus debridement.  Some calluses, however, can have a deep core, and while home treatments are helpful, occasional clinical debridement may be necessary.  In certain cases, surgical excision may be required if no other treatments have proven sufficient.  -Our home callus instruction handout was dispensed, detailing home therapies to minimize regrowth of the calluses.    -debrided with 15 blade without incident  -discussed surgical excision of the skin lesions as next option should deep debridement today and aggressive home therapies prove insufficient.  Briefly discussed   -Rx for Lac Hydrin      Follow up:  prn or sooner with acute issues      Patient's medical history was reviewed today    Body mass index is 26.63 kg/m .  Weight management plan: Patient was referred to their PCP to discuss a diet and exercise plan.        Alex Morales DPM FACFAS FACFAOM  Podiatric Foot & Ankle Surgeon  [facility]  [phone]      Again, thank you for allowing me to participate in the care of your patient.        Sincerely,        Alex Morales DPM, YAYA

## 2019-08-28 ENCOUNTER — OFFICE VISIT - HEALTHEAST (OUTPATIENT)
Dept: INTERNAL MEDICINE | Facility: CLINIC | Age: 54
End: 2019-08-28

## 2019-08-28 ENCOUNTER — COMMUNICATION - HEALTHEAST (OUTPATIENT)
Dept: INTERNAL MEDICINE | Facility: CLINIC | Age: 54
End: 2019-08-28

## 2019-08-28 DIAGNOSIS — R10.9 RIGHT FLANK PAIN: ICD-10-CM

## 2019-08-28 DIAGNOSIS — Z12.31 ENCOUNTER FOR SCREENING MAMMOGRAM FOR BREAST CANCER: ICD-10-CM

## 2019-08-28 DIAGNOSIS — E78.00 HYPERCHOLESTEROLEMIA: ICD-10-CM

## 2019-08-28 DIAGNOSIS — R23.2 FLUSHING: ICD-10-CM

## 2019-08-28 DIAGNOSIS — L84 CALLUS OF FOOT: ICD-10-CM

## 2019-08-28 DIAGNOSIS — E53.8 VITAMIN B12 DEFICIENCY (NON ANEMIC): ICD-10-CM

## 2019-08-28 DIAGNOSIS — R74.8 ELEVATED ALKALINE PHOSPHATASE LEVEL: ICD-10-CM

## 2019-08-28 DIAGNOSIS — M20.41 HAMMER TOE OF RIGHT FOOT: ICD-10-CM

## 2019-08-28 LAB
ALBUMIN SERPL-MCNC: 4.1 G/DL (ref 3.5–5)
ALBUMIN UR-MCNC: NEGATIVE MG/DL
ALP SERPL-CCNC: 129 U/L (ref 45–120)
ALT SERPL W P-5'-P-CCNC: 17 U/L (ref 0–45)
ANION GAP SERPL CALCULATED.3IONS-SCNC: 12 MMOL/L (ref 5–18)
APPEARANCE UR: CLEAR
AST SERPL W P-5'-P-CCNC: 18 U/L (ref 0–40)
BILIRUB SERPL-MCNC: 0.6 MG/DL (ref 0–1)
BILIRUB UR QL STRIP: NEGATIVE
BUN SERPL-MCNC: 16 MG/DL (ref 8–22)
CALCIUM SERPL-MCNC: 9.9 MG/DL (ref 8.5–10.5)
CHLORIDE BLD-SCNC: 103 MMOL/L (ref 98–107)
CHOLEST SERPL-MCNC: 273 MG/DL
CO2 SERPL-SCNC: 24 MMOL/L (ref 22–31)
COLOR UR AUTO: YELLOW
CREAT SERPL-MCNC: 0.86 MG/DL (ref 0.6–1.1)
ERYTHROCYTE [DISTWIDTH] IN BLOOD BY AUTOMATED COUNT: 12.4 % (ref 11–14.5)
FASTING STATUS PATIENT QL REPORTED: YES
GFR SERPL CREATININE-BSD FRML MDRD: >60 ML/MIN/1.73M2
GLUCOSE BLD-MCNC: 76 MG/DL (ref 70–125)
GLUCOSE UR STRIP-MCNC: NEGATIVE MG/DL
HCT VFR BLD AUTO: 39.9 % (ref 35–47)
HDLC SERPL-MCNC: 63 MG/DL
HGB BLD-MCNC: 13.6 G/DL (ref 12–16)
HGB UR QL STRIP: NEGATIVE
KETONES UR STRIP-MCNC: NEGATIVE MG/DL
LDLC SERPL CALC-MCNC: 197 MG/DL
LEUKOCYTE ESTERASE UR QL STRIP: NEGATIVE
LIPASE SERPL-CCNC: 12 U/L (ref 0–52)
MCH RBC QN AUTO: 29.2 PG (ref 27–34)
MCHC RBC AUTO-ENTMCNC: 34 G/DL (ref 32–36)
MCV RBC AUTO: 86 FL (ref 80–100)
NITRATE UR QL: NEGATIVE
PH UR STRIP: 5.5 [PH] (ref 5–8)
PLATELET # BLD AUTO: 339 THOU/UL (ref 140–440)
PMV BLD AUTO: 7.8 FL (ref 7–10)
POTASSIUM BLD-SCNC: 4.3 MMOL/L (ref 3.5–5)
PROT SERPL-MCNC: 7.1 G/DL (ref 6–8)
RBC # BLD AUTO: 4.65 MILL/UL (ref 3.8–5.4)
SODIUM SERPL-SCNC: 139 MMOL/L (ref 136–145)
SP GR UR STRIP: 1.02 (ref 1–1.03)
TRIGL SERPL-MCNC: 63 MG/DL
UROBILINOGEN UR STRIP-ACNC: NORMAL
VIT B12 SERPL-MCNC: 557 PG/ML (ref 213–816)
WBC: 6.1 THOU/UL (ref 4–11)

## 2019-08-28 ASSESSMENT — MIFFLIN-ST. JEOR: SCORE: 1318.44

## 2019-08-29 ENCOUNTER — COMMUNICATION - HEALTHEAST (OUTPATIENT)
Dept: INTERNAL MEDICINE | Facility: CLINIC | Age: 54
End: 2019-08-29

## 2019-09-03 ENCOUNTER — COMMUNICATION - HEALTHEAST (OUTPATIENT)
Dept: INTERNAL MEDICINE | Facility: CLINIC | Age: 54
End: 2019-09-03

## 2019-09-03 DIAGNOSIS — E78.00 HYPERCHOLESTEROLEMIA: ICD-10-CM

## 2019-09-05 ENCOUNTER — HOSPITAL ENCOUNTER (OUTPATIENT)
Dept: ULTRASOUND IMAGING | Facility: HOSPITAL | Age: 54
Discharge: HOME | End: 2019-09-05
Attending: INTERNAL MEDICINE

## 2019-09-05 DIAGNOSIS — R74.8 ELEVATED ALKALINE PHOSPHATASE LEVEL: ICD-10-CM

## 2019-09-05 DIAGNOSIS — R10.9 RIGHT FLANK PAIN: ICD-10-CM

## 2019-09-06 ENCOUNTER — COMMUNICATION - HEALTHEAST (OUTPATIENT)
Dept: INTERNAL MEDICINE | Facility: CLINIC | Age: 54
End: 2019-09-06

## 2019-09-06 DIAGNOSIS — R74.8 ELEVATED ALKALINE PHOSPHATASE LEVEL: ICD-10-CM

## 2019-09-09 ENCOUNTER — OFFICE VISIT (OUTPATIENT)
Dept: PODIATRY | Facility: CLINIC | Age: 54
End: 2019-09-09
Payer: COMMERCIAL

## 2019-09-09 VITALS
SYSTOLIC BLOOD PRESSURE: 102 MMHG | BODY MASS INDEX: 26.82 KG/M2 | WEIGHT: 161 LBS | DIASTOLIC BLOOD PRESSURE: 70 MMHG | HEIGHT: 65 IN

## 2019-09-09 DIAGNOSIS — L84 CALLUS: Primary | ICD-10-CM

## 2019-09-09 PROCEDURE — 99213 OFFICE O/P EST LOW 20 MIN: CPT | Mod: 25 | Performed by: PODIATRIST

## 2019-09-09 PROCEDURE — 11056 PARNG/CUTG B9 HYPRKR LES 2-4: CPT | Performed by: PODIATRIST

## 2019-09-09 ASSESSMENT — MIFFLIN-ST. JEOR: SCORE: 1321.17

## 2019-09-09 NOTE — PATIENT INSTRUCTIONS
Thank you for choosing [facility] / Foot & Ankle Surgery!    DR. TORRE'S CLINIC LOCATIONS:   MONDAY - [place] TUESDAY - [place]   [address] [facility]  [address]   [place] [address] [place] [address]   [phone]       THURSDAY AM - [place] THURSDAY PM - [place]   [address]   [place] [address] [place] [address]   [phone]       FRIDAY AM - [place] SET UP SURGERY: [phone] [address] APPOINTMENTS: [phone]   [place] [address] BILLING QUESTIONS: [phone] FAX NUMBER: [phone]         FYI: The following information is included in the after visit summary for all patients:  Body weight can be a sensitive issue to discuss in clinic, but we think the following information is very important. Although we focus on the feet and ankles, we do support the overall health of our patients. Many things can cause foot and ankle problems. Foot structure, activity level, foot mechanics and injuries are common causes of pain. One very important issue that often goes unmentioned, is body weight. Extra weight can cause increased stress on muscles, ligaments, bones and tendons. Sometimes just a few extra pounds is all it takes to put one over her/his threshold. Without reducing that stress, it can be difficult to alleviate pain. As Foot & Ankle specialists, our job is addressing the lower extremity problem and possible causes. Regarding extra body weight, we encourage patients to discuss diet and weight management plans with their primary care doctors. It is this team approach that gives you the best opportunity for pain relief and getting you back on your feet. [facility] has a Comprehensive Weight Management Program. This program includes counseling, education, non-surgical and surgical approaches to weight loss. If you are interested in learning more either talk to you primary care provider  or call [phone].

## 2019-09-09 NOTE — PROGRESS NOTES
"Foot & Ankle Surgery   September 9, 2019    S:  Pt is seen today for evaluation of painful calluses right foot, including hallux, 4th toe and forefoot.  I saw her approx 4 months ago. She states the Lac Hydrin caused some discomfort, so she's using baby oil.  She states she's curling her toes to alleviate plantar forefoot pressure, and it's causing pain in her 4th toe.    Vitals:    09/09/19 0859   BP: 102/70   Weight: 73 kg (161 lb)   Height: 1.651 m (5' 5\")   '      ROS - Pos for CC.  Patient denies current nausea, vomiting, chills, fevers, belly pain, calf pain, chest pain or SOB.  Complete remainder of ROS it otherwise neg.      PE:  Gen:   No apparent distress  Eye:    Visual scanning without deficit  Ear:    Response to auditory stimuli wnl  Lung:    Non-labored breathing on RA noted  Abd:    NTND per patient report  Lymph:    Neg for pitting/non-pitting edema BLE  Vasc:    Pulses palpable, CFT minimally delayed  Neuro:    Light touch sensation intact to all sensory nerve distributions without paresthesias  Derm:    Large nucleated callus plantar R forefoot near 4th tMPJ.  Large nucleated callus plantar-medial R hallux base and smaller nucleated callus distal R 4th toe  MSK:    ROM, strength wnl without limitation, no pain on palpation noted.  Calf:    Neg for redness, swelling or tenderness    Assessment:  54 year old female with painful nucleated calluses x 3 right foot      Plan:  Discussed etiologies, anatomy and options  1.  Painful nucleated calluses R foot x 3  -debrided with 15 blade x 3 without incident; discussed cost of not covered by insurance.  She states debridement was previously covered  -continue home pumcing.  She's doing it once weekly, advised more frequent utilization  -continue lotion, either Rx or OTC  -again reviewed surgical option.  Specifically, discussed excision of plantar forefoot lesion.  Would recommend orthotics after this to minimize likelihood of recurrence.  Briefly " discussed procedure and post-op course  -however, advised more aggressive/frequent home therapies for now    Follow up:  prn or sooner with acute issues      Body mass index is 26.79 kg/m .  Weight management plan: Patient was referred to their PCP to discuss a diet and exercise plan.         Alex Morales DPM FACFAS FACFAOM  Podiatric Foot & Ankle Surgeon  [facility]  [phone]

## 2019-09-17 ENCOUNTER — RECORDS - HEALTHEAST (OUTPATIENT)
Dept: ADMINISTRATIVE | Facility: OTHER | Age: 54
End: 2019-09-17

## 2019-09-19 ENCOUNTER — COMMUNICATION - HEALTHEAST (OUTPATIENT)
Dept: INTERNAL MEDICINE | Facility: CLINIC | Age: 54
End: 2019-09-19

## 2019-09-24 ENCOUNTER — AMBULATORY - HEALTHEAST (OUTPATIENT)
Dept: LAB | Facility: CLINIC | Age: 54
End: 2019-09-24

## 2019-09-24 DIAGNOSIS — R74.8 ELEVATED ALKALINE PHOSPHATASE LEVEL: ICD-10-CM

## 2019-09-24 DIAGNOSIS — E78.00 HYPERCHOLESTEROLEMIA: ICD-10-CM

## 2019-09-24 LAB — ALP SERPL-CCNC: 102 U/L (ref 45–120)

## 2019-09-25 ENCOUNTER — COMMUNICATION - HEALTHEAST (OUTPATIENT)
Dept: INTERNAL MEDICINE | Facility: CLINIC | Age: 54
End: 2019-09-25

## 2019-10-11 ENCOUNTER — OFFICE VISIT - HEALTHEAST (OUTPATIENT)
Dept: INTERNAL MEDICINE | Facility: CLINIC | Age: 54
End: 2019-10-11

## 2019-10-11 DIAGNOSIS — J06.9 VIRAL UPPER RESPIRATORY TRACT INFECTION: ICD-10-CM

## 2019-10-11 DIAGNOSIS — R10.13 EPIGASTRIC ABDOMINAL PAIN: ICD-10-CM

## 2019-10-11 DIAGNOSIS — R05.9 COUGH: ICD-10-CM

## 2019-10-11 DIAGNOSIS — R10.13 DYSPEPSIA: ICD-10-CM

## 2019-10-11 ASSESSMENT — MIFFLIN-ST. JEOR: SCORE: 1295.76

## 2019-10-14 ENCOUNTER — HOSPITAL ENCOUNTER (OUTPATIENT)
Dept: MAMMOGRAPHY | Facility: CLINIC | Age: 54
Discharge: HOME | End: 2019-10-14
Attending: INTERNAL MEDICINE

## 2019-10-14 DIAGNOSIS — Z12.31 ENCOUNTER FOR SCREENING MAMMOGRAM FOR BREAST CANCER: ICD-10-CM

## 2019-10-15 ENCOUNTER — COMMUNICATION - HEALTHEAST (OUTPATIENT)
Dept: INTERNAL MEDICINE | Facility: CLINIC | Age: 54
End: 2019-10-15

## 2019-10-20 ENCOUNTER — COMMUNICATION - HEALTHEAST (OUTPATIENT)
Dept: INTERNAL MEDICINE | Facility: CLINIC | Age: 54
End: 2019-10-20

## 2019-10-20 DIAGNOSIS — G43.909 MIGRAINE: ICD-10-CM

## 2019-11-12 ENCOUNTER — AMBULATORY - HEALTHEAST (OUTPATIENT)
Dept: NURSING | Facility: CLINIC | Age: 54
End: 2019-11-12

## 2019-11-27 ENCOUNTER — COMMUNICATION - HEALTHEAST (OUTPATIENT)
Dept: LAB | Facility: CLINIC | Age: 54
End: 2019-11-27

## 2019-11-27 DIAGNOSIS — E78.00 HYPERCHOLESTEROLEMIA: ICD-10-CM

## 2019-12-03 ENCOUNTER — AMBULATORY - HEALTHEAST (OUTPATIENT)
Dept: LAB | Facility: CLINIC | Age: 54
End: 2019-12-03

## 2019-12-03 DIAGNOSIS — E78.00 HYPERCHOLESTEROLEMIA: ICD-10-CM

## 2019-12-03 LAB
ALBUMIN SERPL-MCNC: 3.8 G/DL (ref 3.5–5)
ALP SERPL-CCNC: 108 U/L (ref 45–120)
ALT SERPL W P-5'-P-CCNC: 16 U/L (ref 0–45)
AST SERPL W P-5'-P-CCNC: 17 U/L (ref 0–40)
BILIRUB DIRECT SERPL-MCNC: 0.2 MG/DL
BILIRUB SERPL-MCNC: 0.7 MG/DL (ref 0–1)
CHOLEST SERPL-MCNC: 236 MG/DL
FASTING STATUS PATIENT QL REPORTED: YES
HDLC SERPL-MCNC: 53 MG/DL
LDLC SERPL CALC-MCNC: 164 MG/DL
PROT SERPL-MCNC: 6.7 G/DL (ref 6–8)
TRIGL SERPL-MCNC: 97 MG/DL

## 2019-12-04 ENCOUNTER — COMMUNICATION - HEALTHEAST (OUTPATIENT)
Dept: INTERNAL MEDICINE | Facility: CLINIC | Age: 54
End: 2019-12-04

## 2020-01-29 ENCOUNTER — OFFICE VISIT - HEALTHEAST (OUTPATIENT)
Dept: INTERNAL MEDICINE | Facility: CLINIC | Age: 55
End: 2020-01-29

## 2020-01-29 DIAGNOSIS — M54.2 NECK PAIN: ICD-10-CM

## 2020-01-29 ASSESSMENT — MIFFLIN-ST. JEOR: SCORE: 1295.76

## 2020-03-03 ENCOUNTER — OFFICE VISIT - HEALTHEAST (OUTPATIENT)
Dept: INTERNAL MEDICINE | Facility: CLINIC | Age: 55
End: 2020-03-03

## 2020-03-03 DIAGNOSIS — E78.00 HYPERCHOLESTEROLEMIA: ICD-10-CM

## 2020-03-03 DIAGNOSIS — Z00.00 ROUTINE GENERAL MEDICAL EXAMINATION AT A HEALTH CARE FACILITY: ICD-10-CM

## 2020-03-03 DIAGNOSIS — H61.23 BILATERAL IMPACTED CERUMEN: ICD-10-CM

## 2020-03-03 DIAGNOSIS — Z23 NEED FOR PROPHYLACTIC VACCINATION WITH TETANUS-DIPHTHERIA (TD): ICD-10-CM

## 2020-03-03 DIAGNOSIS — Z12.11 SCREEN FOR COLON CANCER: ICD-10-CM

## 2020-03-03 DIAGNOSIS — M85.80 LOW BONE MASS: ICD-10-CM

## 2020-03-03 DIAGNOSIS — L71.9 ROSACEA: ICD-10-CM

## 2020-03-03 DIAGNOSIS — Z23 IMMUNIZATION DUE: ICD-10-CM

## 2020-03-03 DIAGNOSIS — E53.8 VITAMIN B12 DEFICIENCY (NON ANEMIC): ICD-10-CM

## 2020-03-03 DIAGNOSIS — R10.13 EPIGASTRIC PAIN: ICD-10-CM

## 2020-03-03 LAB
ALBUMIN SERPL-MCNC: 3.7 G/DL (ref 3.5–5)
ALBUMIN UR-MCNC: NEGATIVE MG/DL
ALP SERPL-CCNC: 111 U/L (ref 45–120)
ALT SERPL W P-5'-P-CCNC: 13 U/L (ref 0–45)
ANION GAP SERPL CALCULATED.3IONS-SCNC: 10 MMOL/L (ref 5–18)
APPEARANCE UR: CLEAR
AST SERPL W P-5'-P-CCNC: 15 U/L (ref 0–40)
BILIRUB SERPL-MCNC: 0.7 MG/DL (ref 0–1)
BILIRUB UR QL STRIP: NEGATIVE
BUN SERPL-MCNC: 10 MG/DL (ref 8–22)
CALCIUM SERPL-MCNC: 9.3 MG/DL (ref 8.5–10.5)
CHLORIDE BLD-SCNC: 105 MMOL/L (ref 98–107)
CHOLEST SERPL-MCNC: 239 MG/DL
CO2 SERPL-SCNC: 25 MMOL/L (ref 22–31)
COLOR UR AUTO: YELLOW
CREAT SERPL-MCNC: 0.85 MG/DL (ref 0.6–1.1)
ERYTHROCYTE [DISTWIDTH] IN BLOOD BY AUTOMATED COUNT: 12 % (ref 11–14.5)
FASTING STATUS PATIENT QL REPORTED: YES
GFR SERPL CREATININE-BSD FRML MDRD: >60 ML/MIN/1.73M2
GLUCOSE BLD-MCNC: 76 MG/DL (ref 70–125)
GLUCOSE UR STRIP-MCNC: NEGATIVE MG/DL
HCT VFR BLD AUTO: 38.1 % (ref 35–47)
HDLC SERPL-MCNC: 58 MG/DL
HGB BLD-MCNC: 13.3 G/DL (ref 12–16)
HGB UR QL STRIP: NEGATIVE
KETONES UR STRIP-MCNC: NEGATIVE MG/DL
LDLC SERPL CALC-MCNC: 167 MG/DL
LEUKOCYTE ESTERASE UR QL STRIP: NEGATIVE
MCH RBC QN AUTO: 30.4 PG (ref 27–34)
MCHC RBC AUTO-ENTMCNC: 34.8 G/DL (ref 32–36)
MCV RBC AUTO: 87 FL (ref 80–100)
NITRATE UR QL: NEGATIVE
PH UR STRIP: 5.5 [PH] (ref 4.5–8)
PLATELET # BLD AUTO: 357 THOU/UL (ref 140–440)
PMV BLD AUTO: 8.2 FL (ref 7–10)
POTASSIUM BLD-SCNC: 3.6 MMOL/L (ref 3.5–5)
PROT SERPL-MCNC: 6.7 G/DL (ref 6–8)
RBC # BLD AUTO: 4.37 MILL/UL (ref 3.8–5.4)
SODIUM SERPL-SCNC: 140 MMOL/L (ref 136–145)
SP GR UR STRIP: 1.01 (ref 1–1.03)
TRIGL SERPL-MCNC: 70 MG/DL
TSH SERPL DL<=0.005 MIU/L-ACNC: 1.33 UIU/ML (ref 0.3–5)
UROBILINOGEN UR STRIP-ACNC: NORMAL
VIT B12 SERPL-MCNC: 537 PG/ML (ref 213–816)
WBC: 5.4 THOU/UL (ref 4–11)

## 2020-03-03 ASSESSMENT — MIFFLIN-ST. JEOR: SCORE: 1295.76

## 2020-03-04 LAB
25(OH)D3 SERPL-MCNC: 32.5 NG/ML (ref 30–80)
25(OH)D3 SERPL-MCNC: 32.5 NG/ML (ref 30–80)

## 2020-03-05 ENCOUNTER — COMMUNICATION - HEALTHEAST (OUTPATIENT)
Dept: INTERNAL MEDICINE | Facility: CLINIC | Age: 55
End: 2020-03-05

## 2020-03-10 ENCOUNTER — HEALTH MAINTENANCE LETTER (OUTPATIENT)
Age: 55
End: 2020-03-10

## 2020-03-30 ENCOUNTER — COMMUNICATION - HEALTHEAST (OUTPATIENT)
Dept: INTERNAL MEDICINE | Facility: CLINIC | Age: 55
End: 2020-03-30

## 2020-05-18 ENCOUNTER — AMBULATORY - HEALTHEAST (OUTPATIENT)
Dept: INTERNAL MEDICINE | Facility: CLINIC | Age: 55
End: 2020-05-18

## 2020-05-18 ENCOUNTER — COMMUNICATION - HEALTHEAST (OUTPATIENT)
Dept: INTERNAL MEDICINE | Facility: CLINIC | Age: 55
End: 2020-05-18

## 2020-05-18 DIAGNOSIS — L98.9 SKIN LESION: ICD-10-CM

## 2020-05-28 ENCOUNTER — RECORDS - HEALTHEAST (OUTPATIENT)
Dept: ADMINISTRATIVE | Facility: OTHER | Age: 55
End: 2020-05-28

## 2020-06-17 ENCOUNTER — RECORDS - HEALTHEAST (OUTPATIENT)
Dept: ADMINISTRATIVE | Facility: OTHER | Age: 55
End: 2020-06-17

## 2020-09-23 ENCOUNTER — OFFICE VISIT - HEALTHEAST (OUTPATIENT)
Dept: INTERNAL MEDICINE | Facility: CLINIC | Age: 55
End: 2020-09-23

## 2020-09-23 DIAGNOSIS — M85.80 LOW BONE MASS: ICD-10-CM

## 2020-09-23 DIAGNOSIS — Z87.898 HISTORY OF HEADACHE: ICD-10-CM

## 2020-09-23 DIAGNOSIS — E53.8 VITAMIN B12 DEFICIENCY (NON ANEMIC): ICD-10-CM

## 2020-09-23 DIAGNOSIS — D12.6 BENIGN NEOPLASM OF COLON, UNSPECIFIED PART OF COLON: ICD-10-CM

## 2020-09-23 DIAGNOSIS — E78.00 HYPERCHOLESTEROLEMIA: ICD-10-CM

## 2020-09-23 DIAGNOSIS — G43.909 MIGRAINE: ICD-10-CM

## 2020-09-23 LAB
ALBUMIN SERPL-MCNC: 3.9 G/DL (ref 3.5–5)
ALP SERPL-CCNC: 108 U/L (ref 45–120)
ALT SERPL W P-5'-P-CCNC: 12 U/L (ref 0–45)
ANION GAP SERPL CALCULATED.3IONS-SCNC: 9 MMOL/L (ref 5–18)
AST SERPL W P-5'-P-CCNC: 15 U/L (ref 0–40)
BILIRUB SERPL-MCNC: 0.5 MG/DL (ref 0–1)
BUN SERPL-MCNC: 12 MG/DL (ref 8–22)
CALCIUM SERPL-MCNC: 9.2 MG/DL (ref 8.5–10.5)
CHLORIDE BLD-SCNC: 105 MMOL/L (ref 98–107)
CHOLEST SERPL-MCNC: 245 MG/DL
CO2 SERPL-SCNC: 25 MMOL/L (ref 22–31)
CREAT SERPL-MCNC: 0.75 MG/DL (ref 0.6–1.1)
ERYTHROCYTE [DISTWIDTH] IN BLOOD BY AUTOMATED COUNT: 12.3 % (ref 11–14.5)
FASTING STATUS PATIENT QL REPORTED: YES
GFR SERPL CREATININE-BSD FRML MDRD: >60 ML/MIN/1.73M2
GLUCOSE BLD-MCNC: 88 MG/DL (ref 70–125)
HCT VFR BLD AUTO: 40 % (ref 35–47)
HDLC SERPL-MCNC: 57 MG/DL
HGB BLD-MCNC: 13.4 G/DL (ref 12–16)
LDLC SERPL CALC-MCNC: 174 MG/DL
MCH RBC QN AUTO: 29 PG (ref 27–34)
MCHC RBC AUTO-ENTMCNC: 33.5 G/DL (ref 32–36)
MCV RBC AUTO: 87 FL (ref 80–100)
PLATELET # BLD AUTO: 345 THOU/UL (ref 140–440)
PMV BLD AUTO: 7.8 FL (ref 7–10)
POTASSIUM BLD-SCNC: 4.3 MMOL/L (ref 3.5–5)
PROT SERPL-MCNC: 6.5 G/DL (ref 6–8)
RBC # BLD AUTO: 4.61 MILL/UL (ref 3.8–5.4)
SODIUM SERPL-SCNC: 139 MMOL/L (ref 136–145)
TRIGL SERPL-MCNC: 68 MG/DL
WBC: 5.6 THOU/UL (ref 4–11)

## 2020-09-23 ASSESSMENT — MIFFLIN-ST. JEOR: SCORE: 1295.76

## 2020-09-24 LAB — COVID-19 ANTIBODY IGG: NEGATIVE

## 2020-09-25 ENCOUNTER — COMMUNICATION - HEALTHEAST (OUTPATIENT)
Dept: INTERNAL MEDICINE | Facility: CLINIC | Age: 55
End: 2020-09-25

## 2020-10-05 ENCOUNTER — COMMUNICATION - HEALTHEAST (OUTPATIENT)
Dept: INTERNAL MEDICINE | Facility: CLINIC | Age: 55
End: 2020-10-05

## 2020-10-20 ENCOUNTER — HOSPITAL ENCOUNTER (OUTPATIENT)
Dept: MAMMOGRAPHY | Facility: CLINIC | Age: 55
Discharge: HOME | End: 2020-10-20
Attending: INTERNAL MEDICINE

## 2020-10-20 DIAGNOSIS — Z12.31 VISIT FOR SCREENING MAMMOGRAM: ICD-10-CM

## 2020-10-30 ENCOUNTER — COMMUNICATION - HEALTHEAST (OUTPATIENT)
Dept: INTERNAL MEDICINE | Facility: CLINIC | Age: 55
End: 2020-10-30

## 2020-12-27 ENCOUNTER — HEALTH MAINTENANCE LETTER (OUTPATIENT)
Age: 55
End: 2020-12-27

## 2021-02-24 ENCOUNTER — COMMUNICATION - HEALTHEAST (OUTPATIENT)
Dept: SCHEDULING | Facility: CLINIC | Age: 56
End: 2021-02-24

## 2021-02-24 ENCOUNTER — OFFICE VISIT - HEALTHEAST (OUTPATIENT)
Dept: INTERNAL MEDICINE | Facility: CLINIC | Age: 56
End: 2021-02-24

## 2021-02-24 DIAGNOSIS — L98.9 SKIN LESION: ICD-10-CM

## 2021-02-24 ASSESSMENT — MIFFLIN-ST. JEOR: SCORE: 1295.76

## 2021-03-23 ENCOUNTER — COMMUNICATION - HEALTHEAST (OUTPATIENT)
Dept: INTERNAL MEDICINE | Facility: CLINIC | Age: 56
End: 2021-03-23

## 2021-03-25 ENCOUNTER — OFFICE VISIT - HEALTHEAST (OUTPATIENT)
Dept: INTERNAL MEDICINE | Facility: CLINIC | Age: 56
End: 2021-03-25

## 2021-03-25 ENCOUNTER — AMBULATORY - HEALTHEAST (OUTPATIENT)
Dept: SCHEDULING | Facility: CLINIC | Age: 56
End: 2021-03-25

## 2021-03-25 DIAGNOSIS — Z78.0 POST-MENOPAUSAL: ICD-10-CM

## 2021-03-25 DIAGNOSIS — G43.909 MIGRAINE WITHOUT STATUS MIGRAINOSUS, NOT INTRACTABLE, UNSPECIFIED MIGRAINE TYPE: ICD-10-CM

## 2021-03-25 DIAGNOSIS — Z00.00 ROUTINE GENERAL MEDICAL EXAMINATION AT A HEALTH CARE FACILITY: ICD-10-CM

## 2021-03-25 DIAGNOSIS — M85.80 OSTEOPENIA: ICD-10-CM

## 2021-03-25 DIAGNOSIS — L98.9 SKIN LESION: ICD-10-CM

## 2021-03-25 DIAGNOSIS — E53.8 VITAMIN B12 DEFICIENCY (NON ANEMIC): ICD-10-CM

## 2021-03-25 DIAGNOSIS — L71.9 ROSACEA: ICD-10-CM

## 2021-03-25 DIAGNOSIS — E78.00 HYPERCHOLESTEROLEMIA: ICD-10-CM

## 2021-03-25 LAB
ALBUMIN SERPL-MCNC: 4 G/DL (ref 3.5–5)
ALBUMIN UR-MCNC: NEGATIVE G/DL
ALP SERPL-CCNC: 117 U/L (ref 45–120)
ALT SERPL W P-5'-P-CCNC: 17 U/L (ref 0–45)
ANION GAP SERPL CALCULATED.3IONS-SCNC: 11 MMOL/L (ref 5–18)
APPEARANCE UR: CLEAR
AST SERPL W P-5'-P-CCNC: 16 U/L (ref 0–40)
BACTERIA #/AREA URNS HPF: ABNORMAL /[HPF]
BILIRUB SERPL-MCNC: 0.7 MG/DL (ref 0–1)
BILIRUB UR QL STRIP: NEGATIVE
BUN SERPL-MCNC: 13 MG/DL (ref 8–22)
CALCIUM SERPL-MCNC: 9.1 MG/DL (ref 8.5–10.5)
CHLORIDE BLD-SCNC: 104 MMOL/L (ref 98–107)
CHOLEST SERPL-MCNC: 241 MG/DL
CO2 SERPL-SCNC: 24 MMOL/L (ref 22–31)
COLOR UR AUTO: YELLOW
CREAT SERPL-MCNC: 0.8 MG/DL (ref 0.6–1.1)
ERYTHROCYTE [DISTWIDTH] IN BLOOD BY AUTOMATED COUNT: 12.8 % (ref 11–14.5)
FASTING STATUS PATIENT QL REPORTED: YES
GFR SERPL CREATININE-BSD FRML MDRD: >60 ML/MIN/1.73M2
GLUCOSE BLD-MCNC: 73 MG/DL (ref 70–125)
GLUCOSE UR STRIP-MCNC: NEGATIVE MG/DL
HCT VFR BLD AUTO: 40.7 % (ref 35–47)
HDLC SERPL-MCNC: 53 MG/DL
HGB BLD-MCNC: 13.3 G/DL (ref 12–16)
HGB UR QL STRIP: ABNORMAL
KETONES UR STRIP-MCNC: NEGATIVE MG/DL
LDLC SERPL CALC-MCNC: 170 MG/DL
LEUKOCYTE ESTERASE UR QL STRIP: NEGATIVE
MCH RBC QN AUTO: 28.8 PG (ref 27–34)
MCHC RBC AUTO-ENTMCNC: 32.7 G/DL (ref 32–36)
MCV RBC AUTO: 88 FL (ref 80–100)
NITRATE UR QL: NEGATIVE
PH UR STRIP: 6 [PH] (ref 5–8)
PLATELET # BLD AUTO: 338 THOU/UL (ref 140–440)
PMV BLD AUTO: 9.5 FL (ref 7–10)
POTASSIUM BLD-SCNC: 4.4 MMOL/L (ref 3.5–5)
PROT SERPL-MCNC: 6.7 G/DL (ref 6–8)
RBC # BLD AUTO: 4.62 MILL/UL (ref 3.8–5.4)
RBC #/AREA URNS AUTO: ABNORMAL HPF
SODIUM SERPL-SCNC: 139 MMOL/L (ref 136–145)
SP GR UR STRIP: 1.02 (ref 1–1.03)
SQUAMOUS #/AREA URNS AUTO: ABNORMAL LPF
TRIGL SERPL-MCNC: 91 MG/DL
TSH SERPL DL<=0.005 MIU/L-ACNC: 1.23 UIU/ML (ref 0.3–5)
UROBILINOGEN UR STRIP-ACNC: ABNORMAL
WBC #/AREA URNS AUTO: ABNORMAL HPF
WBC: 6.7 THOU/UL (ref 4–11)

## 2021-03-25 ASSESSMENT — MIFFLIN-ST. JEOR: SCORE: 1309.36

## 2021-03-26 LAB — HCV AB SERPL QL IA: NEGATIVE

## 2021-03-30 ENCOUNTER — COMMUNICATION - HEALTHEAST (OUTPATIENT)
Dept: INTERNAL MEDICINE | Facility: CLINIC | Age: 56
End: 2021-03-30

## 2021-04-05 ENCOUNTER — COMMUNICATION - HEALTHEAST (OUTPATIENT)
Dept: INTERNAL MEDICINE | Facility: CLINIC | Age: 56
End: 2021-04-05

## 2021-04-13 ENCOUNTER — AMBULATORY - HEALTHEAST (OUTPATIENT)
Dept: NURSING | Facility: CLINIC | Age: 56
End: 2021-04-13

## 2021-04-15 ENCOUNTER — RECORDS - HEALTHEAST (OUTPATIENT)
Dept: ADMINISTRATIVE | Facility: OTHER | Age: 56
End: 2021-04-15

## 2021-04-24 ENCOUNTER — HEALTH MAINTENANCE LETTER (OUTPATIENT)
Age: 56
End: 2021-04-24

## 2021-04-28 ENCOUNTER — COMMUNICATION - HEALTHEAST (OUTPATIENT)
Dept: INTERNAL MEDICINE | Facility: CLINIC | Age: 56
End: 2021-04-28

## 2021-05-04 ENCOUNTER — AMBULATORY - HEALTHEAST (OUTPATIENT)
Dept: NURSING | Facility: CLINIC | Age: 56
End: 2021-05-04

## 2021-05-24 ENCOUNTER — RECORDS - HEALTHEAST (OUTPATIENT)
Dept: ADMINISTRATIVE | Facility: CLINIC | Age: 56
End: 2021-05-24

## 2021-05-25 ENCOUNTER — RECORDS - HEALTHEAST (OUTPATIENT)
Dept: ADMINISTRATIVE | Facility: CLINIC | Age: 56
End: 2021-05-25

## 2021-05-27 ENCOUNTER — RECORDS - HEALTHEAST (OUTPATIENT)
Dept: ADMINISTRATIVE | Facility: CLINIC | Age: 56
End: 2021-05-27

## 2021-05-27 NOTE — PROGRESS NOTES
Subjective findings: The patient returns to the clinic today complaining of painful calluses on the bottom of both feet.        Objective findings: Nails bilateral feet normally and color.  Skin bilateral feet warm and intact.  There are painful hyperkeratotic nucleated lesions sub-fourth metatarsal head both feet in the plantar aspect of the right great toe.  DP and PT pulses +2 over 4 bilateral feet.  Capillary refill less than 2 seconds bilateral feet.  Negative clonus, negative Babinski bilateral feet.  Range of motion within normal limits bilateral feet.  Muscle power +5 over 5 bilaterally in all compartments.      Assessment: Intractable plantar keratoma both feet      Plan: Debrided 3 hyperkeratotic lesions this date.  2 on the right foot and one in the left foot.  I have recommended the patient return to the clinic as needed for continued foot care.

## 2021-05-27 NOTE — TELEPHONE ENCOUNTER
I am sure [facility]  will not do the treatments that she is hoping for.  I do not know if [facility] or [facility] would either.  You can find out by checking around with various  In network providers.  Thanks.

## 2021-05-27 NOTE — TELEPHONE ENCOUNTER
Dr. Bernardo Merida is out of network for pt ([facility] employee Insurance)    She needs to stay in network and go to [facility] or [facility].    Pt is asking for a recommendation if you have one    Thank you

## 2021-05-27 NOTE — PATIENT INSTRUCTIONS - HE
Podiatry Clinics that offer foot and toenail care  [facility]  [facility]  [phone]    Foot and Ankle Clinics, [facility]  [phone]    [facility]  [place] - Dr. Easley on Tuesdays  [phone]    [facility]  [place]  [phone]    [facility]  [place] and [place]  [phone]    [facility]  [phone]    [facility]  [phone]    [facility]  They have several locations and have a team of registered nurses that offer diabetic foot care.  They do not bill to insurance and the average cost per visit is $37.  [facility]  [phone]    [facility]  *Nurse comes to your home for nail care.  Grecia Gallagher RN Foot Specialist  [phone]

## 2021-05-28 NOTE — TELEPHONE ENCOUNTER
Spoke w/pt and she is going to look into [facility] and [facility] providers.    If the in-network providers do not see for callus removal will need to do an OON for Dr Bernardo Merida.

## 2021-05-30 ENCOUNTER — RECORDS - HEALTHEAST (OUTPATIENT)
Dept: ADMINISTRATIVE | Facility: CLINIC | Age: 56
End: 2021-05-30

## 2021-05-30 VITALS — BODY MASS INDEX: 29.12 KG/M2 | HEIGHT: 64 IN | WEIGHT: 170.6 LBS

## 2021-05-30 VITALS — WEIGHT: 169 LBS | HEIGHT: 65 IN | BODY MASS INDEX: 28.16 KG/M2

## 2021-05-30 VITALS — HEIGHT: 65 IN | BODY MASS INDEX: 28.49 KG/M2 | WEIGHT: 171 LBS

## 2021-05-30 VITALS — WEIGHT: 171 LBS | HEIGHT: 64 IN | BODY MASS INDEX: 29.19 KG/M2

## 2021-05-31 ENCOUNTER — RECORDS - HEALTHEAST (OUTPATIENT)
Dept: ADMINISTRATIVE | Facility: CLINIC | Age: 56
End: 2021-05-31

## 2021-05-31 VITALS — BODY MASS INDEX: 29.02 KG/M2 | WEIGHT: 170 LBS | HEIGHT: 64 IN

## 2021-05-31 VITALS — WEIGHT: 170 LBS | HEIGHT: 64 IN | BODY MASS INDEX: 29.02 KG/M2

## 2021-05-31 VITALS — WEIGHT: 170 LBS | BODY MASS INDEX: 29.02 KG/M2 | HEIGHT: 64 IN

## 2021-05-31 VITALS — WEIGHT: 169.5 LBS | BODY MASS INDEX: 28.21 KG/M2

## 2021-05-31 VITALS — BODY MASS INDEX: 28.85 KG/M2 | WEIGHT: 169 LBS | HEIGHT: 64 IN

## 2021-05-31 NOTE — TELEPHONE ENCOUNTER
----- Message from Matt Sung MD sent at 8/29/2019  5:31 PM CDT -----  Please place order for fasting lipid Cascade and hepatic profile to be done in 3 months.  Diagnosis is hypercholesterolemia.  Call patient to schedule a lab appointment for 3 months.  See recent email.  Thank you

## 2021-05-31 NOTE — PROGRESS NOTES
Office Visit - Follow Up   Hill Odom   54 y.o. female    Date of Visit: 8/28/2019    Chief Complaint   Patient presents with     Follow-up     6 mo f/u - fasting      Toe Pain     Rt foot - 2nd toe (hammer toe) ? if she needs to do any surgery & see someone      Flank Pain     Rt flank pain - mainly when lying down - see Dr. Javier for this last yr in April 2018        Assessment and Plan   1. Right flank pain  Reassurance given today.  Unclear as to the etiology.  She has no symptoms of serotonin release etc.  She does not have a flushing or diarrhea.  I question whether this could be menopausal.  Labs as below.  She has had imaging which is been negative.  Trial of ranitidine to see if this makes a difference.  I will see her back in 6 months unless she is not getting better.  - Comprehensive Metabolic Panel  - HM2(CBC w/o Differential)  - Urinalysis-UC if Indicated  - ranitidine (ZANTAC) 150 MG tablet; Take 1 tablet (150 mg total) by mouth 2 (two) times a day.  Dispense: 60 tablet; Refill: 0  - Lipase    2. Flushing  I question if this is menopausal or not.  She does not have other symptoms of carcinoid.    3. Callus of foot  We will try to get her in to see a podiatrist that will take care of her painful calluses.  - Ambulatory referral to Podiatry    4. Hammer toe of right foot  She would like to see an orthopedic foot surgeon for her hammertoe as it uncomfortable for her.  - Ambulatory referral to Orthopedic Surgery    5. Encounter for screening mammogram for breast cancer  Due for mammogram.  - Mammo Screening Bilateral; Future    6. Hypercholesterolemia  Lipid profile today for monitoring.  - Comprehensive Metabolic Panel  - HM2(CBC w/o Differential)  - Lipid Cascade    7. Vitamin B12 deficiency (non anemic)  She is taking her B12.  - Vitamin B12        Return in about 6 months (around 2/28/2020) for Annual physical.     History of Present Illness   This 54 y.o. old Destinee comes in today for follow-up.   "Extensive visit ensued.  A lot of things to discuss today.  She left her job at the [employer].  After just a few months.  Back at the [employer] now.  She traveled to [place] over 4 July.  Had a nice time.  Going to travel around [facility] this fall.  Has a lot of concerns today.  She thinks she has a second toe hammertoe.  It is uncomfortable for her.  She has painful calluses.  She cannot find a podiatrist that will help her with those.  She notes right flank pain again.  She has had this intermittently over the years.  At one point she had her gallbladder taken out for this.  She tells me that she thinks that something different however.  The pain is occurs after meals as well as when lying on the right side.  She also feels a flushing when this occurs.    She has high cholesterol.  She thinks that skin to be high again.  She has not been following her diet for that.  She has been drinking sugared pop but has arthralgias if she drinks diet pop.    Patient otherwise denies concerns for me.  She is due for mammogram.    Review of Systems: A comprehensive review of systems was negative except as noted.     Medications, Allergies and Problem List   Reviewed, reconciled and updated  Post Discharge Medication Reconciliation Status:      Physical Exam   General Appearance:   Pleasant woman in no distress.  Painful corns/calluses on the bottom of her right foot.  She does have a hammertoe deformity that is fairly mild to the right second toe.  She looks well and does not appear to be in any distress.    /60 (Patient Site: Right Arm, Patient Position: Sitting, Cuff Size: Adult Regular)   Pulse 80   Ht 5' 4\" (1.626 m)   Wt 165 lb (74.8 kg)   LMP 07/29/2012   SpO2 99%   BMI 28.32 kg/m           Additional Information   Current Outpatient Medications   Medication Sig Dispense Refill     cholecalciferol, vitamin D3, (VITAMIN D3) 4,000 unit cap Take 1 capsule by mouth daily. [facility] BRAND only       " cyanocobalamin (VITAMIN B-12) 50 mcg tablet Take 50 mcg by mouth daily.       multivitamin therapeutic tablet Take 1 tablet by mouth daily.       SOOLANTRA 1 % Crea SOM EXT TO FACE D FOR ROSACEA  5     SUMAtriptan (IMITREX) 100 MG tablet TAKE 1 TABLET (100 MG TOTAL) BY MOUTH EVERY 2 (TWO) HOURS AS NEEDED FOR MIGRAINE. 27 tablet 3     ranitidine (ZANTAC) 150 MG tablet Take 1 tablet (150 mg total) by mouth 2 (two) times a day. 60 tablet 0     No current facility-administered medications for this visit.      Allergies   Allergen Reactions     Augmentin [Amoxicillin-Pot Clavulanate]      Need to avoid amoxicillin, had non-specific drug rash to amox---okay for penicillin, see allergy note 1/4/16     Doxycycline      Itchy/rash on face, swollen in hands and feet     Simvastatin Rash     Amoxicillin Rash     Okay for penicillin---see allergy note from 1/4/16  Okay for penicillin---see allergy note from 1/4/16     Betamethasone Rash     Ciprofloxacin Rash     Iron Rash     Social History     Tobacco Use     Smoking status: Never Smoker     Smokeless tobacco: Never Used   Substance Use Topics     Alcohol use: No     Drug use: No       Review and/or order of clinical lab tests:  Review and/or order of radiology tests:  Review and/or order of medicine tests:  Discussion of test results with performing physician:  Decision to obtain old records and/or obtain history from someone other than the patient:  Review and summarization of old records and/or obtaining history from someone other than the patient and.or discussion of case with another health care provider:  Independent visualization of image, tracing or specimen itself:    Time: total time spent with the patient was 40 minutes of which >50% was spent in counseling and coordination of care     Matt Sung MD

## 2021-05-31 NOTE — TELEPHONE ENCOUNTER
"Patient was able to review lab results via [employer]. Please review patient's [employer] response and advise, thank you.     \"Bony Sung - Yes I am willing to try medicine for my high cholesterol as I am concerned that it is so high.  If I get it down can I then go back off the medicine?  Also wondering if it is so high due to my diet over the past few days or if it measures for longer than that?  I did have high fat food over the weekend while celebrating my birthday so just curious if that would have affected the test results.   thank you   Destinee\"  "

## 2021-05-31 NOTE — TELEPHONE ENCOUNTER
Called - patient scheduled for lab recheck x 3 months (12/3/19) at 7:20 am per her request. Future lab order enter in epic to be co-sign.

## 2021-05-31 NOTE — TELEPHONE ENCOUNTER
Myla, please make sure that the right upper quadrant abdominal ultrasound gets completed. (see last lab letter)  Also, she should make a lab appointment for 2 to 3 months.  Diagnosis is hypercholesterolemia.  Please place orders for hepatic profile and fasting lipid.  I think that was already done.  Lets start rosuvastatin 10 mg daily.  #90 with no refills.

## 2021-05-31 NOTE — TELEPHONE ENCOUNTER
Pt notified that we will place order for an ultrasound to evaluated elevated alkaline phosphatase. Order enter in epic to be co-sign. Schedulers please contact patient to make appt, thank you.

## 2021-05-31 NOTE — TELEPHONE ENCOUNTER
----- Message from Matt Sung MD sent at 8/28/2019  5:47 PM CDT -----  Please call pt and send letter:    Destinee, all of your tests are normal except for very high cholesterol and one mildly elevated liver enzyme that is found in bile ducts.  It is also found in bone and intestine.  Because of your pain in your right side I am going to recommend a liver ultrasound for further evaluation.  As far as the cholesterol goes, current guidelines would recommend that we get you on medicines at this time to prevent heart attacks and strokes.  That would be in addition to a healthy Mediterranean diet and exercise.  Would you be willing to start something at this time?    Myla, let me know what she says about the cholesterol medicine and please place order for right upper quadrant ultrasound.  Diagnosis is right flank pain and elevated alkaline phosphatase.  Thank you

## 2021-06-01 VITALS — BODY MASS INDEX: 29.19 KG/M2 | HEIGHT: 64 IN | WEIGHT: 171 LBS

## 2021-06-01 VITALS — HEIGHT: 64 IN | WEIGHT: 170 LBS | BODY MASS INDEX: 29.02 KG/M2

## 2021-06-01 VITALS — HEIGHT: 64 IN | WEIGHT: 173 LBS | BODY MASS INDEX: 29.53 KG/M2

## 2021-06-01 NOTE — TELEPHONE ENCOUNTER
----- Message from Matt Sung MD sent at 9/5/2019  4:21 PM CDT -----  Please call pt:    Ultrasound is normal for someone who has had a gallbladder removed.  I would like to recheck that alkaline phosphatase in the couple weeks to see if it goes up or down.    Myla/clinical assistant: Please place order for repeat alkaline phosphatase.  Diagnosis is elevated alkaline phosphatase.  Schedule lab appointment for 2 to 3 weeks out.  Thank you so much.

## 2021-06-01 NOTE — TELEPHONE ENCOUNTER
Please call and let patient know that I do not think she should be concerned about the ranitidine. However, she could transition to omeprazole 20 mg daily until I see her.  This is a little stronger antacid.  Please also offer her an appointment with a partner tomorrow if she would like this evaluated sooner.

## 2021-06-01 NOTE — TELEPHONE ENCOUNTER
I left a detailed message with results/recommendations below per PCP. It appears patient was also able to review results via [employer]. I have placed future lab orders for PCP to co-sign orders. I have requested for patient to rtc to clinic to schedule lab appt for recheck in 2-3 weeks.

## 2021-06-02 VITALS — BODY MASS INDEX: 27.66 KG/M2 | HEIGHT: 64 IN | WEIGHT: 162 LBS

## 2021-06-02 VITALS — BODY MASS INDEX: 28.7 KG/M2 | WEIGHT: 167.19 LBS

## 2021-06-02 VITALS — HEIGHT: 64 IN | BODY MASS INDEX: 28.51 KG/M2 | WEIGHT: 167 LBS

## 2021-06-02 VITALS — HEIGHT: 64 IN | WEIGHT: 167 LBS | BODY MASS INDEX: 28.51 KG/M2

## 2021-06-02 VITALS — BODY MASS INDEX: 30.22 KG/M2 | HEIGHT: 64 IN | WEIGHT: 177 LBS

## 2021-06-02 VITALS — BODY MASS INDEX: 27.81 KG/M2 | WEIGHT: 162 LBS

## 2021-06-02 NOTE — PROGRESS NOTES
Office Visit - Follow Up   Hill Odom   54 y.o. female    Date of Visit: 10/11/2019    Chief Complaint   Patient presents with     Follow-up     ongoing Rt flank pain & dull abd pain- tried prilosec x 1 wk however had diarrhea      URI     x 5 days (cough, sinus pressure) - is feeling better      Migraine     increased migraine - taking sumatriptan prn        Assessment and Plan   1. Epigastric abdominal pain  Lengthy discussion with patient.  If the soda pop is causing her symptoms to worsen then she needs to stay away from the soda pop.  She states that this is just going to be too difficult for her to do.  I have asked her to try to get off of pop for 2 weeks.  She should continue ranitidine 150 twice daily.  If things are not improving or resolved by that time she is to let me know.  Low threshold to have her go back to see gastroenterology.    2. Cough  Consistent with viral URI.  Reassurance.    3. Viral upper respiratory tract infection  As above peer    4. Dyspepsia  As above.  I spent 25 minutes of this 25 minutes counseling regarding her dyspepsia and soda pop use.        Return in about 5 months (around 3/11/2020) for Next scheduled follow up.     History of Present Illness   This 54 y.o. old patient comes in today for follow-up of some dyspepsia symptoms she is been having last few weeks.  She contacted us about 3 weeks ago with some dyspepsia type symptoms.  Midepigastric abdominal discomfort that radiates the back.  She is had this in the past with and its thought to been her gallbladder.  She even had her gallbladder out but this is a recurrence.  It seems to be worse when she drinks her Coca-Cola.  She enjoys Coca-Cola immensely and states it is very difficult for her to live without it.  She had a nice time on a vacation with her mother along [facility] and with that she did not have discomfort.  She tried some ranitidine but was worried about the recent recall.  She then transferred to  "Pepcid but then she really take much of that because I suggested some omeprazole.  This caused loose bowel movements.  She is taking omeprazole in the past without loose bowel movements so she wonders if that was from something else.  She has a cold now as well with congestion runny nose cough etc.  She is been off work a lot this week.    Review of Systems: A comprehensive review of systems was negative except as noted.     Medications, Allergies and Problem List   Reviewed, reconciled and updated  Post Discharge Medication Reconciliation Status:      Physical Exam   General Appearance:   Pleasant woman.  Nasally congested.  Occasional cough through the interview.  Lungs clear.  Abdomen is soft only minimally tender to palpation on the over the xiphoid process.  No other abnormalities on abdominal exam    /78 (Patient Site: Right Arm, Patient Position: Sitting, Cuff Size: Adult Regular)   Pulse 88   Temp 97.8  F (36.6  C)   Ht 5' 4\" (1.626 m)   Wt 160 lb (72.6 kg)   LMP 07/29/2012   SpO2 99%   BMI 27.46 kg/m           Additional Information   Current Outpatient Medications   Medication Sig Dispense Refill     cholecalciferol, vitamin D3, (VITAMIN D3) 4,000 unit cap Take 1 capsule by mouth daily. [facility] BRAND only       cyanocobalamin (VITAMIN B-12) 50 mcg tablet Take 50 mcg by mouth daily.       multivitamin therapeutic tablet Take 1 tablet by mouth daily.       SOOLANTRA 1 % Crea SOM EXT TO FACE D FOR ROSACEA  5     SUMAtriptan (IMITREX) 100 MG tablet TAKE 1 TABLET (100 MG TOTAL) BY MOUTH EVERY 2 (TWO) HOURS AS NEEDED FOR MIGRAINE. 27 tablet 3     No current facility-administered medications for this visit.      Allergies   Allergen Reactions     Augmentin [Amoxicillin-Pot Clavulanate]      Need to avoid amoxicillin, had non-specific drug rash to amox---okay for penicillin, see allergy note 1/4/16     Doxycycline      Itchy/rash on face, swollen in hands and feet     Simvastatin Rash     Amoxicillin Rash "     Okay for penicillin---see allergy note from 1/4/16  Okay for penicillin---see allergy note from 1/4/16     Betamethasone Rash     Ciprofloxacin Rash     Iron Rash     Social History     Tobacco Use     Smoking status: Never Smoker     Smokeless tobacco: Never Used   Substance Use Topics     Alcohol use: No     Drug use: No       Review and/or order of clinical lab tests:  Review and/or order of radiology tests:  Review and/or order of medicine tests:  Discussion of test results with performing physician:  Decision to obtain old records and/or obtain history from someone other than the patient:  Review and summarization of old records and/or obtaining history from someone other than the patient and.or discussion of case with another health care provider:  Independent visualization of image, tracing or specimen itself:    Time: total time spent with the patient was 25 minutes of which >50% was spent in counseling and coordination of care     Matt Sung MD

## 2021-06-02 NOTE — TELEPHONE ENCOUNTER
Refill Approved    Rx renewed per Medication Renewal Policy. Medication was last renewed on 2/26/18.    Ana Rosado, [facility] Triage/Med Refill 10/20/2019     Requested Prescriptions   Pending Prescriptions Disp Refills     SUMAtriptan (IMITREX) 100 MG tablet [Pharmacy Med Name: SUMATRIPTAN SUCC 100 MG TABLET] 27 tablet 3     Sig: TAKE 1 TABLET (100 MG TOTAL) BY MOUTH EVERY 2 (TWO) HOURS AS NEEDED FOR MIGRAINE.       Triptans Refill Protocol Passed - 10/20/2019  4:41 PM        Passed - PCP or prescribing provider visit in past 12 months       Last office visit with prescriber/PCP: 10/11/2019 Matt Sung MD OR same dept: 10/11/2019 Matt Sung MD OR same specialty: 10/11/2019 Matt Sung MD  Last physical: 2/28/2019 Last MTM visit: Visit date not found   Next visit within 3 mo: Visit date not found  Next physical within 3 mo: Visit date not found  Prescriber OR PCP: Matt Sung MD  Last diagnosis associated with med order: There are no diagnoses linked to this encounter.  If protocol passes may refill for 12 months if within 3 months of last provider visit (or a total of 15 months).

## 2021-06-03 VITALS — BODY MASS INDEX: 28.17 KG/M2 | WEIGHT: 165 LBS | HEIGHT: 64 IN

## 2021-06-03 VITALS
BODY MASS INDEX: 27.31 KG/M2 | OXYGEN SATURATION: 99 % | HEIGHT: 64 IN | HEART RATE: 88 BPM | WEIGHT: 160 LBS | SYSTOLIC BLOOD PRESSURE: 118 MMHG | TEMPERATURE: 97.8 F | DIASTOLIC BLOOD PRESSURE: 78 MMHG

## 2021-06-03 NOTE — PROGRESS NOTES
Hill Odom presents for her annual flu vaccine. Consent form is reviewed and immunization given without incident. See immunization history for documentation details.     Roxanne Mccoy CSS

## 2021-06-03 NOTE — TELEPHONE ENCOUNTER
Please make sure that fasting lipid profile and hepatic profiles are placed.  These can be standing orders.  See lab result phone notes from August.  Diagnosis is hypercholesterolemia.

## 2021-06-04 VITALS
BODY MASS INDEX: 27.31 KG/M2 | WEIGHT: 160 LBS | SYSTOLIC BLOOD PRESSURE: 120 MMHG | HEIGHT: 64 IN | DIASTOLIC BLOOD PRESSURE: 62 MMHG | HEART RATE: 64 BPM | OXYGEN SATURATION: 97 %

## 2021-06-04 VITALS
HEART RATE: 70 BPM | DIASTOLIC BLOOD PRESSURE: 74 MMHG | WEIGHT: 160 LBS | HEIGHT: 64 IN | BODY MASS INDEX: 27.31 KG/M2 | SYSTOLIC BLOOD PRESSURE: 136 MMHG

## 2021-06-05 ENCOUNTER — RECORDS - HEALTHEAST (OUTPATIENT)
Dept: INTERNAL MEDICINE | Facility: CLINIC | Age: 56
End: 2021-06-05

## 2021-06-05 VITALS
WEIGHT: 160 LBS | DIASTOLIC BLOOD PRESSURE: 60 MMHG | BODY MASS INDEX: 27.31 KG/M2 | HEART RATE: 72 BPM | HEIGHT: 64 IN | TEMPERATURE: 96.8 F | OXYGEN SATURATION: 98 % | SYSTOLIC BLOOD PRESSURE: 114 MMHG

## 2021-06-05 VITALS
HEART RATE: 70 BPM | SYSTOLIC BLOOD PRESSURE: 118 MMHG | WEIGHT: 163 LBS | BODY MASS INDEX: 27.83 KG/M2 | TEMPERATURE: 97.6 F | DIASTOLIC BLOOD PRESSURE: 70 MMHG | OXYGEN SATURATION: 96 % | HEIGHT: 64 IN

## 2021-06-05 VITALS
WEIGHT: 160 LBS | DIASTOLIC BLOOD PRESSURE: 70 MMHG | TEMPERATURE: 96.8 F | HEART RATE: 58 BPM | BODY MASS INDEX: 27.31 KG/M2 | HEIGHT: 64 IN | OXYGEN SATURATION: 95 % | SYSTOLIC BLOOD PRESSURE: 118 MMHG

## 2021-06-05 DIAGNOSIS — R10.9 ABDOMINAL PAIN: ICD-10-CM

## 2021-06-05 NOTE — PROGRESS NOTES
"OFFICE VISIT NOTE    Subjective:   Chief Complaint:  Neck Pain (X 2 weeks, it is causing her nausea and numbness aroung front of neck/throat)    This is a 54-year-old woman with a past history of hyperlipidemia, migraine headaches, polyarthralgia.  She is in today with a 2-week history of what she describes as some neck soreness.  She is had a couple of days when she is awakened and has soreness at the base of the neck.  Sometimes radiating up towards the upper neck.  Nothing into the chest.  Nothing into the arms.  Symptoms can be moderately severe or hardly noticeable.  Yesterday was a good day but she awoke again this morning with discomfort at the base of the neck.  She tells me she could go to a gym 3 or 4 times a week to 30 to 40 minutes of fairly vigorous exercise.  No chest pain or any shortness of breath.  No near syncope.  No recent injuries.  No history of whiplash.    Current Outpatient Medications   Medication Sig     cholecalciferol, vitamin D3, (VITAMIN D3) 4,000 unit cap Take 1 capsule by mouth daily. [facility] only     cyanocobalamin (VITAMIN B-12) 50 mcg tablet Take 50 mcg by mouth daily.     multivitamin therapeutic tablet Take 1 tablet by mouth daily.     SOOLANTRA 1 % Crea SOM EXT TO FACE D FOR ROSACEA     SUMAtriptan (IMITREX) 100 MG tablet TAKE 1 TABLET (100 MG TOTAL) BY MOUTH EVERY 2 (TWO) HOURS AS NEEDED FOR MIGRAINE.       PSFHx: Tobacco Status:  She  reports that she has never smoked. She has never used smokeless tobacco.    Review of Systems:  A comprehensive review of systems is negative except for the comments above    Objective:    Ht 5' 4\" (1.626 m)   Wt 160 lb (72.6 kg)   LMP 07/29/2012   BMI 27.46 kg/m    GENERAL: No acute distress.  Does not healthy-appearing woman in no distress.  Blood pressure today is 130/74.  Pulse in the 70s.  Good range of motion of the neck.  No palpable abnormalities in the neck.  No thyroid mass.  No adenopathy.  Full flexion and extension.  She can " tilt her head to either side with only minor soreness felt at the base of the neck on either side.  When I have her extend her head backward against my hand resistance, she does have some neck discomfort anteriorly.  Handgrip is excellent.  Pulses in the hands and wrists are normal.  Lungs are clear.  Heart shows a regular rhythm without murmur gallop or rub.  The abdomen is soft without any tenderness.  No pulsatile masses.    Assessment & Plan   Hill Odom is a 54 y.o. female.    Complaints of soreness at the base of the neck on and off for 2 weeks.  Also, symptoms of mild dizziness usually brought on by changes in head or neck position.  Suspicious for benign positional vertigo.  EKG done today to rule out ischemia, is normal.  No evidence of pericarditis ischemia or arrhythmia.  I suspect this is a minor musculoskeletal issue.  No evidence of cord compression.  I do not think this is a vascular problem.  For the dizziness, she could try over-the-counter meclizine.  PRN use of Advil or Tylenol for any soreness of the neck.  I think she should resume her normal gym activities.  30 minutes was spent with this patient.  Diagnoses and all orders for this visit:    Neck pain            Ian Guadarrama MD  Transcription using voice recognition software, may contain typographical errors.

## 2021-06-06 NOTE — PROGRESS NOTES
Office Visit - Physical   Hill Odom   54 y.o.  female    Date of visit: 3/3/2020  Physician: Matt Sung MD     Assessment and Plan   1. Routine general medical examination at a health care facility  Patient is living an active healthy lifestyle.  We discussed dietary measures she should undertake including less pop once again.  I have urged shingles vaccination at this time.  Tetanus updated today.  Colonoscopy is due.  - Lipid Cascade  - Comprehensive Metabolic Panel  - Urinalysis-UC if Indicated  - HM2(CBC w/o Differential)  - Vitamin B12  - Vitamin D, Total (25-Hydroxy)  - Thyroid Cascade    2. Screen for colon cancer    - Ambulatory referral for Colonoscopy    3. Vitamin B12 deficiency (non anemic)    - HM2(CBC w/o Differential)  - Vitamin B12    4. Rosacea  Continue medications per dermatology and dietary measures as needed.    5. Low bone mass  We reviewed a bone density which showed good trabecular bone score and low fracture risk.  Recheck next year I believe.  - Vitamin D, Total (25-Hydroxy)    6. Hypercholesterolemia  Dietary measures discussed again today.  - Lipid Cascade  - Comprehensive Metabolic Panel  - Thyroid Cascade    7. Epigastric pain  Resolved if she does not drink Coca-Cola.    8. Bilateral impacted cerumen  Irrigated without difficulty today.        Return in about 6 months (around 9/3/2020) for Recheck.     Chief Complaint   Chief Complaint   Patient presents with     Annual Exam     fasting      Ear Fullness     Rt ear fullness -check for wax        Patient Profile   Social History     Social History Narrative     Not on file        Past Medical History   Patient Active Problem List   Diagnosis     Hypercholesterolemia     Rosacea     Migraine Headache     Dermatographia     Cholelithiasis     Neuropathic pain     Sensory disturbance     Polyarthralgia     Primary osteoarthritis of both knees     Benign neoplasm of colon     Epigastric pain     Hemorrhoids     Neoplasm of  uncertain behavior of stomach, intestines, and rectum     Numbness     Other chronic pain     Small fiber neuropathy     Special screening for malignant neoplasms, colon     Vitamin B12 deficiency (non anemic)     H/O: hysterectomy     Low bone mass       Past Surgical History  She has a past surgical history that includes pr supracerv abd hysterectomy; Hysterectomy; Breast biopsy (Left); Foot surgery; Blepharoplasty (Bilateral, 12/21/2015); and Hysterectomy.     History of Present Illness   This 54 y.o. old Destinee comes in today for full physical.  She reports she is been doing fairly well.  Thinks her ears may be full of wax again.  Otherwise she is doing well.  She is cross-country skiing.  Learning to skate ski.  Traditionally she did traditional cross-country skiing.  Enjoying that.  She is hoping to go to [place] this summer.  Offers no somatic concerns for me today.  Rosacea is an issue especially if she eats berries.  Wonders when she should get a shingles vaccine.  She had herpes zoster about 2 years ago she believes.    She is [facility] [employer].  Mother is doing well and quite active.    Review of Systems: A comprehensive review of systems was negative except as noted.     Medications and Allergies   Current Outpatient Medications   Medication Sig Dispense Refill     ranitidine (ZANTAC) 150 MG tablet Take 150 mg by mouth as needed (Epigastric abdominal pain).        cholecalciferol, vitamin D3, (VITAMIN D3) 4,000 unit cap Take 1 capsule by mouth daily. [facility] only       cyanocobalamin (VITAMIN B-12) 50 mcg tablet Take 50 mcg by mouth daily.       multivitamin therapeutic tablet Take 1 tablet by mouth daily.       SOOLANTRA 1 % Crea SOM EXT TO FACE D FOR ROSACEA  5     SUMAtriptan (IMITREX) 100 MG tablet TAKE 1 TABLET (100 MG TOTAL) BY MOUTH EVERY 2 (TWO) HOURS AS NEEDED FOR MIGRAINE. 27 tablet 3     No current facility-administered medications for this visit.      Allergies   Allergen  "Reactions     Augmentin [Amoxicillin-Pot Clavulanate]      Need to avoid amoxicillin, had non-specific drug rash to amox---okay for penicillin, see allergy note 1/4/16     Doxycycline      Itchy/rash on face, swollen in hands and feet     Simvastatin Rash     Amoxicillin Rash     Okay for penicillin---see allergy note from 1/4/16  Okay for penicillin---see allergy note from 1/4/16     Betamethasone Rash     Ciprofloxacin Rash     Iron Rash        Family and Social History   Family History   Problem Relation Age of Onset     Breast cancer Cousin      Hyperlipidemia Mother      Hyperlipidemia Maternal Uncle         Social History     Tobacco Use     Smoking status: Never Smoker     Smokeless tobacco: Never Used   Substance Use Topics     Alcohol use: No     Drug use: No        Physical Exam   General Appearance:   Pleasant middle-age woman in no distress.  Good spirits today.    /62 (Patient Site: Right Arm, Patient Position: Sitting, Cuff Size: Adult Regular)   Pulse 64   Ht 5' 4\" (1.626 m)   Wt 160 lb (72.6 kg)   LMP 07/29/2012   SpO2 97%   BMI 27.46 kg/m      EYES: Eyelids, conjunctiva, and sclera were normal. Pupils were normal. Cornea, iris, and lens were normal bilaterally.  HEAD, EARS, NOSE, MOUTH, AND THROAT: Head and face were normal. Hearing was normal to voice and the ears were normal to external exam. Nose appearance was normal and there was no discharge. Oropharynx was normal.  NECK: Neck appearance was normal. There were no neck masses and the thyroid was not enlarged.  RESPIRATORY: Breathing pattern was normal and the chest moved symmetrically.  Percussion/auscultatory percussion was normal.  Lung sounds were normal and there were no abnormal sounds.  CARDIOVASCULAR: Heart rate and rhythm were normal.  S1 and S2 were normal and there were no extra sounds or murmurs. Peripheral pulses in arms and legs were normal.  Jugular venous pressure was normal.  There was no peripheral " edema.  GASTROINTESTINAL: The abdomen was normal in contour.  Bowel sounds were present.  Percussion detected no organ enlargement or tenderness.  Palpation detected no tenderness, mass, or enlarged organs.   MUSCULOSKELETAL: Skeletal configuration was normal and muscle mass was normal for age. Joint appearance was overall normal.  LYMPHATIC: There were no enlarged nodes.  SKIN/HAIR/NAILS: Skin color was normal.  She has rosacea on the face.  She also has some calluses on her feet.  Hair and nails were normal.  NEUROLOGIC: The patient was alert and oriented to person, place, time, and circumstance. Speech was normal. Cranial nerves were normal. Motor strength was normal for age. The patient was normally coordinated.  PSYCHIATRIC:  Mood and affect were normal and the patient had normal recent and remote memory. The patient's judgment and insight were normal.    ADDITIONAL VITAL SIGNS: none  CHEST WALL/BREASTS: nml breasts and axillae bilat.    RECTAL: def  GENITAL/URINARY: def     Additional Information        Matt Sung MD  Internal Medicine  Contact me at [phone]

## 2021-06-08 NOTE — PROGRESS NOTES
Chief complaint: Food allergy    History of present illness: This is a 51-year-old woman I have seen previously for drug allergy as well as concerns for food allergy who is here today to again discuss food allergy.  At her last visit, we had discussed that her symptoms were not consistent with  IgE mediated food allergy. she reports that she continues to have issues with foods, however.  She notes with milk, eggs, fish, weaned, chicken and soy she's had difficulty with rashes after eating these foods.  She states oftentimes she notes the rash is 3 or 4 days later.  She does have a history of rosacea.  She would like to be tested for food allergy to rule this out.  We had talked previously that this could be a sensitivity and that no evidence of medicine test exists for these sorts of sensitivities.  She is also concerned today that she is allergic to memory foam.  She states she used memory foam inserts in her shoes and a pillow and noted that her face or feet would become numb.  No rashes, itching or swelling.  She believes this could be an allergy and would like to be tested for the chemicals in the foam that could be causing the symptoms.    Past medical history, social history, family medical history, meds and allergies reviewed and updated accordingly.      Review of Systems performed as above and the remainder is negative.        Current Outpatient Prescriptions:      cholecalciferol, vitamin D3, (VITAMIN D3) 4,000 unit cap, Take 1 capsule by mouth daily. [facility] BRAND only, Disp: , Rfl:      SUMAtriptan (IMITREX) 100 MG tablet, TAKE 1 TABLET (100 MG TOTAL) BY MOUTH EVERY 2 (TWO) HOURS AS NEEDED FOR MIGRAINE., Disp: 27 tablet, Rfl: 3    Allergies   Allergen Reactions     Amoxicillin      Okay for penicillin---see allergy note from 1/4/16     Augmentin [Amoxicillin-Pot Clavulanate]      Need to avoid amoxicillin, had non-specific drug rash to amox---okay for penicillin, see allergy note 1/4/16     Simvastatin Rash  "    Iron Rash       Visit Vitals     /68     Pulse 68     Ht 5' 4.5\" (1.638 m)     Wt 169 lb (76.7 kg)     LMP 07/29/2012     BMI 28.56 kg/m2     Gen:  Pleasant female not in acute distress  HEENT:  Eyes no erythema of the bulbar or palpebral conjunctiva, no edema.  Mouth:  Throat clear, no lip or tongue edema.      Skin:  No rashes or lesions  Psych:  Alert and oriented times 3    Last Food Skin Allergy Test Results  Major Allergens  Wheat  1:20 (W/F in mm): 0-0 (02/10/17 0933)  Soy 1:40 (W/F in mm): 0-0 (02/10/17 0933)  Milk, Cow  1:20 (W/F in mm): 0-0 (02/10/17 0933)  Egg (White)  1:20 (W/F in mm): 0-0 (02/10/17 0933)  Meat  Chicken  1:20 (W/F in mm): 0-0 (02/10/17 0933)  Fish  [place]  1:20 (W/F in mm): 0-0 (02/10/17 0933)  Controls  Device Type: QUINTIP (02/10/17 0933)  Neg. Control: 50% Glycerine-Saline H (W/F in millimeters): 0-0 (02/10/17 0933)  Pos. Control Histamine 6 mg/ml (W/F in millimeters): 7-15 (02/10/17 0933)     Impression report and plan:    1.  Skin rash  2.  Numbness in feet    Food allergy testing was negative.  She is not a candidate for food allergy testing, however, she continues to think that these reactions are due to allergy.  I went into great detail describing that IgE mediated food allergy consists of hives, swelling and shortness of breath.  I would not recommend any further food allergy testing and informed that the testing today can help her find other reasons for her skin rashes.  She could have dermatographia.  I did give her listed histamine containing foods.  I also asked her to seek out other etiologies for her skin rashes including perhaps a consultation with dermatology.  I stated numbness is not an IgE mediated symptoms.  For this reason I would not recommend any testing for the numbness in her feet or face.  I stated a contact allergy is a possibility with something such as memory foam, however, it is very unlikely.  It also should not cause numbness.  I can answer any " further questions for her in the future she will let me know.  Follow as needed.    Time spent with the patient, 30 minutes, greater than half was spent counseling and coordination of care regarding food allergy.

## 2021-06-08 NOTE — PROGRESS NOTES
"HPI: I am consulted in this 51 y.o. female who has been experiencing some problems with abdominal pain. she has noted this for about few  year(s). It has been occurring about 1 times per week. It is moderate.  It is precipitated by possible diet but not always demonstated. It is associated with nausea or vomiting.   It is not associated with chills or fevers.    Allergies:Amoxicillin; Augmentin [amoxicillin-pot clavulanate]; Simvastatin; and Iron    Past Medical History:   Diagnosis Date     Cholelithiasis 6/15/2015     History of anesthesia complications     \"Slow to wake following geberal anesthesia.\"     Hyperlipidemia     Created by Conversion      Hyperlipidemia      Migraine headache      PONV (postoperative nausea and vomiting)      Rosacea     Created by Conversion        Past Surgical History:   Procedure Laterality Date     BLEPHAROPLASTY Bilateral 12/21/2015    Procedure: BLEPHAROPLASTY BILATERAL UPPER LIDS;  Surgeon: Sweta Ring MD;  Location: [facility] OR;  Service:      BREAST BIOPSY Left     benign     FOOT SURGERY       HYSTERECTOMY       HYSTERECTOMY       WA SUPRACERV ABD HYSTERECTOMY      Description: Supracervical Hysterectomy;  Recorded: 07/03/2012;       CURRENT MEDS:      Family History   Problem Relation Age of Onset     Breast cancer Cousin      Hyperlipidemia Mother      Hyperlipidemia Maternal Uncle         reports that she has never smoked. She has never used smokeless tobacco. She reports that she does not drink alcohol or use illicit drugs.    Review of Systems - Pertinent items are noted in HPI.  A 12 point comprehensive review of systems was negative except as noted.    Visit Vitals     /66     Pulse 69     Resp 18     Ht 5' 4\" (1.626 m)     Wt 171 lb (77.6 kg)     LMP 07/29/2012     SpO2 100%     BMI 29.35 kg/m2         EXAM:  GENERAL: This is a well developed female in no distress.  MOUTH: Mucus membranes moist  SKIN: Grossly intact  EYES: No icterus  CARDIAC: RRR w/out " murmur  CHEST/LUNG: Clear  ABDOMEN: Soft, nontender RUQ non-tender, B/S present, Jaramillo's sign negative  BACK: No costovertebral tenderness  NEURO: Alert and oriented, nonfocal  PSYCHIATRIC: normal affect      LABS:  Lab Results   Component Value Date    WBC 7.9 12/19/2016    WBC 6.4 06/22/2015    HGB 12.1 12/19/2016    HCT 36.6 12/19/2016    MCV 89 12/19/2016     12/19/2016     INR/Prothrombin Time      Lab Results   Component Value Date    ALT 18 12/19/2016    AST 16 12/19/2016    ALKPHOS 78 12/19/2016    BILITOT 0.3 12/19/2016       IMAGES:     US Abdomen Complete (Order [id number])   Imaging   Date: 10/28/2016 Department: [facility] Ultrasound Released By: Vangie Duffy [facility] Authorizing: Roosevelt Triplett DO   Study Result   US ABDOMEN COMPLETE  10/28/2016 3:43 PM     INDICATION: Epigastric pain and back pain  COMPARISON: 6/18/2015     FINDINGS:  GALLBLADDER: Solitary mobile gallstone unchanged. No abnormal gallbladder wall thickening or pericholecystic fluid.  BILE DUCTS: No bile duct dilation. The common hepatic duct measures 2 mm.  LIVER: Normal. No mass.  SPLEEN: Normal in size.  RIGHT KIDNEY: 9.0 cm in length. Normal. No hydronephrosis.  LEFT KIDNEY: 7.8 cm in length. Normal. No hydronephrosis.  PANCREAS: Visualized portions are normal.  AORTA: Normal in caliber.  IVC: Normal, patent where seen.     No ascites.     IMPRESSION:   CONCLUSION:  1. Cholelithiasis. No signs of cholecystitis.  2. Otherwise negative.         Assessment/Plan: Pt with signs and symptoms consistent with symptomatic cholelithiasis. I have recommended a laparoscopic cholecystecomy. I discussed with her that we might not be able to do this laparoscopically. I went over some of the risks of surgery including but not limited to bleeding, infection and bile duct injury.Discussed the risks and benefits at length with her and answered all questions.      Bjorn Kirby MD  [facility] Department of Surgery

## 2021-06-08 NOTE — PROGRESS NOTES
Office Visit - Follow Up   Hill Odom   51 y.o. female    Date of Visit: 2/8/2017    Chief Complaint   Patient presents with     Neuropathy     numbness in bilateral leg/feet are better - wonders if she needs to f/u with [facility] on 2/14/17     Referral     Allergy referral for food allergy testing        Assessment and Plan   1. Neuropathy  She attributes this to the insole she had.  She does not think it has anything to do with the fact there was something in her shoe doing pressing upon the nerve.  She believes it's an allergy.  I have a hard time convincing her of this.  I can't tell her for sure however.  She wants to see an allergist.  We'll get that set up.    2. Calculus of gallbladder without cholecystitis without obstruction  I really don't think a lot of her symptoms are due to her cholelithiasis.  The abdominal pain episode she had at night last month could have been I suppose.  Refer back to her general surgeon for evaluation.  - Ambulatory referral to General Surgery    3. Environmental allergies  She states she is allergic to a lot of different things.  Refer back to the allergist for evaluation.  - Ambulatory referral to Allergy    4. Other chronic back pain  I have asked that she follow-up with the neurologist.    5. Rosacea  She refuses treatment.  I suggested maybe following a food diary.    6. Need for immunization against influenza  I did give her a flu shot today.  She was hesitant and I urged her not to get it if she was that hesitant but she wished to go ahead and go with it.  - Influenza, Seasonal Quad, Preservative Free 36+ Months        Return in about 6 months (around 8/8/2017) for Recheck.     History of Present Illness   This 51 y.o. old patient comes in today for follow-up of her multiple issues and extensive visit ensued as always.  She has been dealing with a lot of different issues.  Either while outlined in previous notes.  She's been having neuropathic pains.  She's been  having back pain she's been having abdominal pains she's been having skin problems.  She's had extensive LDL evaluation.  She was unhappy with the answers that had been given to her in the past.  It's been felt that a lot of her symptoms were likely due to anxiety but she's been unwilling to accept this.  As a second opinion we sent her to [facility] but they wouldn't see her.  They felt they would not be able to add anything to her care.  Today she comes in and states that her numbness in her legs is better.  She attributes this to utilizing a memory foam insole in her shoes.  She believes she is allergic to the memory foam.  When she took those out her symptoms are better.  She continues to have a burning discomfort in her back over.  She also notes abdominal pain and burning one night in January after eating a chicken breast.  She woke up in the melena and had burning pain which she feels is a due to a gallstone for a bout 3 hours.  She is seen surgery in the past and they've told her that they didn't feel that her symptoms were consistent and I hadn't agreed with them.    She states that when she eats certain things her face gets fiery red.  She has a known history of rosacea but she does not feel that this is her rosacea flaring up despite my telling her it likely is.  She doesn't utilize treatment for rosacea because she states has not been helpful for her.    In general she is feeling better however.  She has appointment to see a neurologist at the [facility] and wonders if she should proceed with that.  Again she still has the burning discomfort in her back.     Review of Systems: A comprehensive review of systems was negative except as noted.     Medications, Allergies and Problem List   Reviewed and updated     Physical Exam   General Appearance:   Pleasant woman.  Obvious rosacea of the face.  No focal motor deficits noted today.    Visit Vitals     /74 (Patient Site: Right Arm, Patient  "Position: Sitting, Cuff Size: Adult Regular)     Pulse 78     Ht 5' 5\" (1.651 m)     Wt 171 lb (77.6 kg)     LMP 07/29/2012     SpO2 100%     BMI 28.46 kg/m2            Additional Information   Current Outpatient Prescriptions   Medication Sig Dispense Refill     cholecalciferol, vitamin D3, (VITAMIN D3) 4,000 unit cap Take 1 capsule by mouth daily. [facility] BRAND only       SUMAtriptan (IMITREX) 100 MG tablet TAKE 1 TABLET (100 MG TOTAL) BY MOUTH EVERY 2 (TWO) HOURS AS NEEDED FOR MIGRAINE. 27 tablet 3     No current facility-administered medications for this visit.      Allergies   Allergen Reactions     Amoxicillin      Okay for penicillin---see allergy note from 1/4/16     Augmentin [Amoxicillin-Pot Clavulanate]      Need to avoid amoxicillin, had non-specific drug rash to amox---okay for penicillin, see allergy note 1/4/16     Simvastatin Rash     Iron Rash     Social History   Substance Use Topics     Smoking status: Never Smoker     Smokeless tobacco: Never Used     Alcohol use No       Review and/or order of clinical lab tests:  Review and/or order of radiology tests:  Review and/or order of medicine tests:  Discussion of test results with performing physician:  Decision to obtain old records and/or obtain history from someone other than the patient:  Review and summarization of old records and/or obtaining history from someone other than the patient and.or discussion of case with another health care provider:  Independent visualization of image, tracing or specimen itself:    Time: total time spent with the patient was 40 minutes of which >50% was spent in counseling and coordination of care     Matt Sung MD  "

## 2021-06-08 NOTE — PROGRESS NOTES
Subjective findings: The patient returns to the clinic today complaining of painful calluses on the bottom of both feet.  The patient has had this condition.   She has tried over-the-counter orthotics which have given her very little relief.    Objective findings: Nails bilateral feet normally and color.  Skin bilateral feet warm and intact.  There are painful hyperkeratotic nucleated lesions sub-fourth metatarsal head both feet.  There is also a hyperkeratotic lesion on the plantar aspect of the right great toe.  DP and PT pulses +2 over 4 bilateral feet.  Capillary refill less than 2 seconds bilateral feet.  Negative clonus, negative Babinski bilateral feet.  Range of motion within normal limits bilateral feet.  Muscle power +5 over 5 bilaterally in all compartments.    Assessment: Intractable plantar keratoma both feet, forefoot varus bilaterally     Plan: Debrided hyperkeratotic lesions both feet.  I have recommended orthotics.

## 2021-06-10 NOTE — PROGRESS NOTES
Hill is scheduled for laparoscopic cholecystectomy with Dr. Kirby on 5/25/17 at [facility]. Patient was given instructions of arrival time, need a , pre-op physical within 30days and NPO after midnight. Patient verbalized understanding.     Juliette Ortega [facility]  Physician    [facility] Surgery   [phone]

## 2021-06-10 NOTE — PROGRESS NOTES
"Consult  HPI:    51 y.o. year old female who I have been consulted by Matt Sung MD for evaluation of Follow-up (Stormy, still have the burning sensation during the day, no new imagings)   she returns today to discuss her gallbladder issues.  She has continued ongoing pain intermittently with burning sensation that goes to her back.  We spent today 20 minutes face-to-face contact and discussion all about her issues.  She was consulted with me originally back in February and I recommend her cholecystectomy.  She has read a lot on the Internet discussed the bunch with her doc and still is not convinced that this is her problem.  I discussed the findings any definitive test and that she is having active pain were to do a HIDA scan to see if her cystic duct is blocked but she does have gallbladder stones or cholelithiasis she has intermittent pain which is usually associated with eating and this association to me make sense to do a laparoscopic cholecystectomy.  I spent the entire time today discussing this with her    Allergies:Amoxicillin; Augmentin [amoxicillin-pot clavulanate]; Doxycycline; Simvastatin; and Iron    Past Medical History:   Diagnosis Date     Cholelithiasis 6/15/2015     History of anesthesia complications     \"Slow to wake following geberal anesthesia.\"     Hyperlipidemia     Created by Conversion      Hyperlipidemia      Migraine headache      PONV (postoperative nausea and vomiting)      Rosacea     Created by Conversion        Past Surgical History:   Procedure Laterality Date     BLEPHAROPLASTY Bilateral 12/21/2015    Procedure: BLEPHAROPLASTY BILATERAL UPPER LIDS;  Surgeon: Sweta Ring MD;  Location: [facility];  Service:      BREAST BIOPSY Left     benign     FOOT SURGERY       HYSTERECTOMY       HYSTERECTOMY       TN SUPRACERV ABD HYSTERECTOMY      Description: Supracervical Hysterectomy;  Recorded: 07/03/2012;       CURRENT MEDS:  Current Outpatient Prescriptions on File Prior to " "Visit   Medication Sig Dispense Refill     cholecalciferol, vitamin D3, (VITAMIN D3) 4,000 unit cap Take 1 capsule by mouth daily. [facility] BRAND only       SUMAtriptan (IMITREX) 100 MG tablet TAKE 1 TABLET (100 MG TOTAL) BY MOUTH EVERY 2 (TWO) HOURS AS NEEDED FOR MIGRAINE. 27 tablet 3     No current facility-administered medications on file prior to visit.        Family History   Problem Relation Age of Onset     Breast cancer Cousin      Hyperlipidemia Mother      Hyperlipidemia Maternal Uncle         reports that she has never smoked. She has never used smokeless tobacco. She reports that she does not drink alcohol or use illicit drugs.    Review of Systems:  Negative except as hpi, Otherwise twelve system of review is negative.      OBJECTIVE:  Vitals:    04/24/17 0907   BP: 116/69   Patient Site: Left Arm   Patient Position: Sitting   Cuff Size: Adult Large   Pulse: 86   SpO2: 97%   Weight: 170 lb 9.6 oz (77.4 kg)   Height: 5' 4\" (1.626 m)     Body mass index is 29.28 kg/(m^2).    EXAM:  GENERAL: This is a well-developed 51 y.o. female who appears her stated age  No exam today    LABS:  Lab Results   Component Value Date    WBC 7.9 12/19/2016    WBC 6.4 06/22/2015    HGB 12.1 12/19/2016    HCT 36.6 12/19/2016    MCV 89 12/19/2016     12/19/2016     INR/Prothrombin Time      Lab Results   Component Value Date    HGBA1C 5.1 12/19/2016     Lab Results   Component Value Date    ALT 18 12/19/2016    AST 16 12/19/2016    ALKPHOS 78 12/19/2016    BILITOT 0.3 12/19/2016        Images: reviewed ultrasound    Assessment/Plan:   Cholelithiasis  Abdominal pain  We spent the time today all discussing gallbladder disease cholelithiasis cholecystitis and the options of laparoscopic cholecystectomy.  The risks of anesthesia infection bleeding going open duct injury issues and anesthetic were all discussed and she at this time is still going to think about this.  Her questions and them available to set this up if she desires " to do so.  Total time today spent 20 minutes   Bjorn Kirby MD  [facility] Department of Surgery

## 2021-06-10 NOTE — PROGRESS NOTES
Preoperative Consultation   Hill Odom   51 y.o.  female    Date of visit: 5/16/2017  Physician: Matt Sung MD    This is a preoperative consultation requested by Dr. Kirby in preparation for cholecystectomy on 5/25 at [facility], fax [phone].       Assessment and Plan   Hill Odom was seen in preoperative consultation in preparation for lap shaunna.  This is a high risk surgery and the patient has no increased risk for major cardiac complications.    Please note patient has chronic daily back pain which is a tingling sensation especially after meals.  She is also had episodes of more severe abdominal discomfort after meals usually at night.  She does have known gallstones.  She is going to have her gallbladder removed as it is felt that some or all of her symptoms are caused by her cholelithiasis.  She also reports her other vague neuropathic/sensory symptoms have returned including foot numbness and tingling and she will follow-up with her neurologist later this month regarding these.    1. Calculus of gallbladder without cholecystitis without obstruction    2. Pre-op exam    3. Neuropathic pain         Patient Profile   Social History     Social History Narrative        Past Medical History   Patient Active Problem List   Diagnosis     Hyperlipidemia     Rosacea     Migraine Headache     Dermatographia     Cholelithiasis     Neuropathic pain     Sensory disturbance       Past Surgical History  She has a past surgical history that includes supracerv abd hysterectomy; Hysterectomy; Breast biopsy (Left); Foot surgery; Blepharoplasty (Bilateral, 12/21/2015); and Hysterectomy.     History of Present Illness   This 51 y.o. old patient with several months to years history of varying neuropathic complaints.  Felt possibly due to anxiety but she refuses to believe this.  She does have known cholelithiasis and some symptoms that are postprandial and felt likely due to her cholelithiasis.   "She is to undergo lap shaunna by Dr. Mirta reyes.    Recent antiplatelet use: no  Personal or family history of bleeding or clotting disorders: no  Steroid use in the past year: no  Personal or family history of difficulty with anesthesia: no  Current cardiopulmonary symptoms: no  Last Menstrual Period: several years.     Review of Systems: A comprehensive review of systems was negative except as noted.     Medications and Allergies   Current Outpatient Prescriptions   Medication Sig Dispense Refill     cholecalciferol, vitamin D3, (VITAMIN D3) 4,000 unit cap Take 1 capsule by mouth daily. [facility] BRAND only       SUMAtriptan (IMITREX) 100 MG tablet TAKE 1 TABLET (100 MG TOTAL) BY MOUTH EVERY 2 (TWO) HOURS AS NEEDED FOR MIGRAINE. 27 tablet 3     No current facility-administered medications for this visit.      Allergies   Allergen Reactions     Amoxicillin      Okay for penicillin---see allergy note from 1/4/16     Augmentin [Amoxicillin-Pot Clavulanate]      Need to avoid amoxicillin, had non-specific drug rash to amox---okay for penicillin, see allergy note 1/4/16     Doxycycline      Itchy/rash on face, swollen in hands and feet     Simvastatin Rash     Iron Rash        Family and Social History   Family History   Problem Relation Age of Onset     Breast cancer Cousin      Hyperlipidemia Mother      Hyperlipidemia Maternal Uncle         Social History   Substance Use Topics     Smoking status: Never Smoker     Smokeless tobacco: Never Used     Alcohol use No        Physical Exam   General Appearance:   Pleasant middle-aged woman in no distress.    /70 (Patient Site: Right Arm, Patient Position: Sitting, Cuff Size: Adult Regular)  Pulse 82  Ht 5' 4\" (1.626 m)  Wt 170 lb (77.1 kg)  LMP 07/29/2012  SpO2 98%  BMI 29.18 kg/m2    EYES: Eyelids, conjunctiva, and sclera were normal. Pupils were normal. Cornea, iris, and lens were normal bilaterally.  HEAD, EARS, NOSE, MOUTH, AND THROAT: Head and face were " normal. Hearing was normal to voice and the ears were normal to external exam. Nose appearance was normal and there was no discharge. Oropharynx was normal.  NECK: Neck appearance was normal. There were no neck masses and the thyroid was not enlarged.  RESPIRATORY: Breathing pattern was normal and the chest moved symmetrically.  Percussion/auscultatory percussion was normal.  Lung sounds were normal and there were no abnormal sounds.  CARDIOVASCULAR: Heart rate and rhythm were normal.  S1 and S2 were normal and there were no extra sounds or murmurs. Peripheral pulses in arms and legs were normal.  Jugular venous pressure was normal.  There was no peripheral edema.  GASTROINTESTINAL: The abdomen was normal in contour.  Bowel sounds were present.  Percussion detected no organ enlargement or tenderness.  Palpation detected no tenderness, mass, or enlarged organs.   MUSCULOSKELETAL: Skeletal configuration was normal and muscle mass was normal for age. Joint appearance was overall normal.  LYMPHATIC: There were no enlarged nodes.  SKIN/HAIR/NAILS: Skin color was normal.  There were no skin lesions.  Hair and nails were normal.  NEUROLOGIC: The patient was alert and oriented to person, place, time, and circumstance. Speech was normal. Cranial nerves were normal. Motor strength was normal for age. The patient was normally coordinated.  PSYCHIATRIC:  Mood and affect were normal and the patient had normal recent and remote memory. The patient's judgment and insight were normal.    ADDITIONAL VITAL SIGNS: none  CHEST WALL/BREASTS: def    RECTAL: def  GENITAL/URINARY: def     Additional Tests   Laboratory: pending    Radiology: reviewed.    Electrocardiogram: see tracing/report.     Total time spent with the patient today was 40 minutes of which > 50% was spent in counseling and coordination of care     Matt Sung MD  Internal Medicine  Contact me at [phone]

## 2021-06-11 NOTE — PROGRESS NOTES
HPI: Pt is here for follow up of a lap shaunna.   she is doing well.  Pain is well controlled.  No difficulties with the surgical wound/wounds.  she is eating well and denies fever and chills.         /64 (Patient Site: Left Arm, Patient Position: Sitting, Cuff Size: Adult Regular)  Pulse 69  LMP 07/29/2012  SpO2 99%  Breastfeeding? No    EXAM:  GENERAL:Appears well  ABDOMEN:  Soft, +BS  SURGICAL WOUNDS:  Incisions healing well, no enduration or drainage.    Discussed pathology, benign.     Assessment/Plan: . Doing well after surgery and should follow up as needed.      Jourdan Hussein, [facility] Department of Surgery

## 2021-06-11 NOTE — PROGRESS NOTES
Office Visit - Follow Up   Hill Odom   55 y.o. female    Date of Visit: 9/23/2020    Chief Complaint   Patient presents with     Hyperlipidemia     6 mo f/u - fasting today, check cholesterol levels     Ear Fullness     check right ear for wax     Advice Only     if patient should get shingrix injection at this time        Assessment and Plan   1. Hypercholesterolemia  She has not been working as hard on diet she tells me.  I urged improved efforts at that.  - Lipid Cascade  - Comprehensive Metabolic Panel    2. Benign neoplasm of colon, unspecified part of colon  She wonders if she should continue with 5-year colonoscopies and I urged her to continue same.    3. Migraine  Renewed her sumatriptan hand today.  - SUMAtriptan (IMITREX) 100 MG tablet; TAKE 1 TABLET (100 MG TOTAL) BY MOUTH EVERY 2 (TWO) HOURS AS NEEDED FOR MIGRAINE.  Dispense: 27 tablet; Refill: 3    4. Low bone mass  She will be due for bone density in the spring.  We will schedule that after our physical in the spring.    5. Vitamin B12 deficiency (non anemic)  Check hemogram today.  She remains on her B12 supplementation orally.  - HM2(CBC w/o Differential)    6. History of headache  I suspect her symptoms are not COVID related but will check antibodies per her request.  - COVID-19 Virus (Coronavirus) Antibody Screen, IgG; Future        Return in about 6 months (around 3/23/2021) for Annual physical.     History of Present Illness   This 55 y.o. old Destinee comes in today for follow-up.  She reports things have been going well.  She is a  at the [employer].  That is going well.  She is due for flu shot today.  She would like me to check her ears out for wax.  She had a headache that was not like her usual migraines this summer.  She wonders if she could have had COVID.  Migraines have been stable.  She takes Imitrex for that.  She has not been as active this summer.  Did just school went to [facility] biking yesterday  "with friends.  She plans to do cross-country skiing this winter as usual.  Denies other new somatic concerns.  Reports she is doing fairly well and enjoys working from home.    Review of Systems: A comprehensive review of systems was negative except as noted.     Medications, Allergies and Problem List   Reviewed, reconciled and updated  Post Discharge Medication Reconciliation Status:      Physical Exam   General Appearance:   Pleasant woman in no distress.  Good spirits.  Ears are without cerumen bilaterally.    /60   Pulse 72   Temp 96.8  F (36  C)   Ht 5' 4\" (1.626 m)   Wt 160 lb (72.6 kg)   LMP 07/29/2012   SpO2 98%   BMI 27.46 kg/m           Additional Information   Current Outpatient Medications   Medication Sig Dispense Refill     cholecalciferol, vitamin D3, (VITAMIN D3) 4,000 unit cap Take 1 capsule by mouth daily. [facility] BRAND only       cyanocobalamin (VITAMIN B-12) 50 mcg tablet Take 50 mcg by mouth daily.       multivitamin therapeutic tablet Take 1 tablet by mouth daily.       SOOLANTRA 1 % Crea SOM EXT TO FACE D FOR ROSACEA  5     SUMAtriptan (IMITREX) 100 MG tablet TAKE 1 TABLET (100 MG TOTAL) BY MOUTH EVERY 2 (TWO) HOURS AS NEEDED FOR MIGRAINE. 27 tablet 3     No current facility-administered medications for this visit.      Allergies   Allergen Reactions     Augmentin [Amoxicillin-Pot Clavulanate]      Need to avoid amoxicillin, had non-specific drug rash to amox---okay for penicillin, see allergy note 1/4/16     Doxycycline      Itchy/rash on face, swollen in hands and feet     Simvastatin Rash     Amoxicillin Rash     Okay for penicillin---see allergy note from 1/4/16  Okay for penicillin---see allergy note from 1/4/16     Betamethasone Rash     Ciprofloxacin Rash     Iron Rash     Social History     Tobacco Use     Smoking status: Never Smoker     Smokeless tobacco: Never Used   Substance Use Topics     Alcohol use: No     Drug use: No       Review and/or order of clinical lab " tests:  Review and/or order of radiology tests:  Review and/or order of medicine tests:  Discussion of test results with performing physician:  Decision to obtain old records and/or obtain history from someone other than the patient:  Review and summarization of old records and/or obtaining history from someone other than the patient and.or discussion of case with another health care provider:  Independent visualization of image, tracing or specimen itself:    Time: not applicable     Matt Sung MD

## 2021-06-12 NOTE — PROGRESS NOTES
"  Office Visit - Follow Up   Hill Odom   51 y.o. female    Date of Visit: 8/8/2017    Chief Complaint   Patient presents with     Follow-up     3 mo f/u - not fasting      Back Pain     completed PT and back is pain is a lot better      Referral     for allergist to do some food/medication testing         Assessment and Plan   1. Back pain  Much improved.  Continue with therapy as needed.    2. Rosacea  Good control.  Her skin looks excellent.  Continue follow with Derm at the .    3. Neuropathic pain  Stable.  Continue to follow with neurology as needed.  Not really bothersome at this time.    4. Allergic reaction  A lot of her symptoms I am not feeling are actually allergies.  She may have some medication intolerances which cause various symptoms.  In the past I told her to make a diary of food and intake and topical things and write down any symptoms so she can try to piece together what she does not does not tolerate.  She has been resistant to that.  She wants to \"be tested for all of these things\".  She [facility] that does extensive testing and tells people what they are allergic to.  I have discussed with her that I do not believe that that clinic is very reputable and they practice a lot of nontraditional medicine.  I have urged she not spend the money to go down there.  I have suggested she get a second opinion with Dr. Holcomb per her request.  - Ambulatory referral to Allergy    5. Healthcare maintenance  She is due for mammogram.  We will get that set up.  - Mammo Screening Bilateral; Future        Return in about 6 months (around 2/8/2018) for Annual physical.     History of Present Illness   This 51 y.o. old pleasant woman comes in today for follow-up.  She reports she is doing relatively well.  I referred her reader to my earlier notes this year.  She is a relatively new patient to me.  She has had all sorts of somatic concerns many of which we have not " "been able to find a exact cause.  She was having some vague abdominal discomfort and underwent a cholecystectomy and she is feeling better.  She also underwent some physical therapy by a \"miracle worker\" physical therapist and she is doing better from that standpoint.  She continues to believe she has allergies to multiple foods and other things.  She wishes to see a second opinion allergist at the [facility] over furred to her her by her neurologist Dr. Henderson.  She denies any other new concerns.  She notes her rosacea has been well controlled with ivermectin topically.  Previously she is refused to treat her rosacea.    Review of Systems: A comprehensive review of systems was negative except as noted.     Medications, Allergies and Problem List   Reviewed and updated     Physical Exam   General Appearance:   Nice woman.  She is in good spirits.  Less anxious time seen her in the past.    /78 (Patient Site: Right Arm, Patient Position: Sitting, Cuff Size: Adult Regular)  Pulse 82  Ht 5' 4\" (1.626 m)  Wt 170 lb (77.1 kg)  LMP 07/29/2012  SpO2 99%  BMI 29.18 kg/m2         Additional Information   Current Outpatient Prescriptions   Medication Sig Dispense Refill     cholecalciferol, vitamin D3, (VITAMIN D3) 4,000 unit cap Take 1 capsule by mouth daily. [facility] BRAND only       SUMAtriptan (IMITREX) 100 MG tablet TAKE 1 TABLET (100 MG TOTAL) BY MOUTH EVERY 2 (TWO) HOURS AS NEEDED FOR MIGRAINE. 27 tablet 3     cyanocobalamin (VITAMIN B-12) 50 mcg tablet Take 50 mcg by mouth daily.       No current facility-administered medications for this visit.      Allergies   Allergen Reactions     Amoxicillin      Okay for penicillin---see allergy note from 1/4/16     Augmentin [Amoxicillin-Pot Clavulanate]      Need to avoid amoxicillin, had non-specific drug rash to amox---okay for penicillin, see allergy note 1/4/16     Doxycycline      Itchy/rash on face, swollen in hands and feet     Simvastatin Rash "     Betamethasone Rash     Ciprofloxacin Rash     Iron Rash     Social History   Substance Use Topics     Smoking status: Never Smoker     Smokeless tobacco: Never Used     Alcohol use No       Review and/or order of clinical lab tests:  Review and/or order of radiology tests:  Review and/or order of medicine tests:  Discussion of test results with performing physician:  Decision to obtain old records and/or obtain history from someone other than the patient:  Review and summarization of old records and/or obtaining history from someone other than the patient and.or discussion of case with another health care provider:  Independent visualization of image, tracing or specimen itself:    Time: total time spent with the patient was 25 minutes of which >50% was spent in counseling and coordination of care     Matt Sung MD

## 2021-06-12 NOTE — TELEPHONE ENCOUNTER
I will be going to the [facility].  The reaction you had to the shot is a typical reaction and tells us your immune system was working hard to make antibodies against the chickenpox virus.  I would hope that the second shot would not be as severe a reaction but I cannot say for sure.  I will leave it up to you whether or not you want to get the second vaccine.

## 2021-06-14 NOTE — PROGRESS NOTES
"  Office Visit - Follow Up   Hill Odom   52 y.o. female    Date of Visit: 11/10/2017    Chief Complaint   Patient presents with     Follow-up     Shingles bottom of left foot and buttocks        Assessment and Plan   1. Shingles  We had a long discussion today about shingles, postherpetic neuralgia in the etiology of her chronic low back pain.  Interestingly, when she took Valtrex in the shingles initially went away her back pain which she has had for quite some time resolved as well.  I am not certain if her pain has been neuritis from shingles or postherpetic neuralgia.  Currently I have requested that she resume Valtrex 1 g 3 times daily for 1 week and resume gabapentin 1-300 mg at night which seemed to help previously.  We also discussed capsaicin and her menthol cream.    2. Neuropathic pain  See above  Return in about 4 weeks (around 12/8/2017) for follow up with PCP.     History of Present Illness   This 52 y.o. old woman comes in for evaluation of possible shingles.  She had been diagnosed with this at an urgent care.  Is on her left foot.  She had pain radiating down from her back across her shin.  She took Valtrex and also took some gabapentin which helped.  Things seem to be resolving and then she noted worsening pain over the past couple of days.  She was asked about her foot and she has a recurrence of the rash.  She is no rash in her back or elsewhere along the L5 dermatome.  She otherwise feels well and is healthy.  She has no known immunodeficiency.  She has not been on any prednisone.  Pain is manageable.    Review of Systems: A comprehensive review of systems was negative except as noted.     Medications, Allergies and Problem List   Reviewed and updated     Physical Exam   General Appearance:   No acute distress    /82  Pulse 90  Ht 5' 4\" (1.626 m)  Wt 169 lb (76.7 kg)  LMP 07/29/2012  BMI 29.01 kg/m2    She does have a cluster of vesicles on the base of her left foot with a red " base.  Neurologic exam otherwise unremarkable lower extremity.     Additional Information   Current Outpatient Prescriptions   Medication Sig Dispense Refill     cholecalciferol, vitamin D3, (VITAMIN D3) 4,000 unit cap Take 1 capsule by mouth daily. [facility] BR[facility] only       cyanocobalamin (VITAMIN B-12) 50 mcg tablet Take 50 mcg by mouth daily.       multivitamin therapeutic tablet Take 1 tablet by mouth daily.       SUMAtriptan (IMITREX) 100 MG tablet TAKE 1 TABLET (100 MG TOTAL) BY MOUTH EVERY 2 (TWO) HOURS AS NEEDED FOR MIGRAINE. 27 tablet 3     valACYclovir (VALTREX) 1000 MG tablet Take 1 tablet (1,000 mg total) by mouth 3 (three) times a day for 7 days. 21 tablet 0     No current facility-administered medications for this visit.      Allergies   Allergen Reactions     Amoxicillin      Okay for penicillin---see allergy note from 1/4/16     Augmentin [Amoxicillin-Pot Clavulanate]      Need to avoid amoxicillin, had non-specific drug rash to amox---okay for penicillin, see allergy note 1/4/16     Doxycycline      Itchy/rash on face, swollen in hands and feet     Simvastatin Rash     Betamethasone Rash     Ciprofloxacin Rash     Iron Rash     Social History   Substance Use Topics     Smoking status: Never Smoker     Smokeless tobacco: Never Used     Alcohol use No       Review and/or order of clinical lab tests:  Review and/or order of radiology tests:  Review and/or order of medicine tests:  Discussion of test results with performing physician:  Decision to obtain old records and/or obtain history from someone other than the patient:  Review and summarization of old records and/or obtaining history from someone other than the patient and.or discussion of case with another health care provider:  Independent visualization of image, tracing or specimen itself:    Time: total time spent with the patient was 25 minutes of which >50% was spent in counseling and coordination of care     London Javier MD

## 2021-06-15 NOTE — PROGRESS NOTES
Office Visit - Follow Up   Hill Odom   55 y.o. female    Date of Visit: 2/24/2021    Chief Complaint   Patient presents with     Herpes Zoster     Rt index finger pain/redness since 2/20 & left eye pain/discomfort (since this morning)         Assessment and Plan   1. Skin lesion  She is very nervous and anxious about all of this.  I did reassure her that her symptoms are not consistent with disseminated zoster or uncomplicated herpes zoster.  If anything, her symptoms could have been a early herpetic deysi that went away quickly on her index finger.  To be on the safe side we will go ahead and give her some Valtrex for a week.  She would feel more comfortable doing that.  I reassured her.  I also discussed with her that getting the second shingles vaccine may be more problematic for her than what it is worth because she is very leery of getting it done.  She agrees and does not think she is going to proceed.  - valACYclovir (VALTREX) 1000 MG tablet; Take 1 tablet (1,000 mg total) by mouth 3 (three) times a day for 7 days.  Dispense: 21 tablet; Refill: 0        Return in about 1 month (around 3/24/2021) for Next scheduled follow up.     History of Present Illness   This 55 y.o. old Destinee has a history of shingles and had her first shingles vaccine back in October but did not feel well with that shot.  She does not think she wants to get another one.  Over the weekend she developed some papular pustular lesions on her right index finger.  She then developed a lesion on her MRI today and she is concerned that she has shingles.  She put something on the finger, namely a antibiotic ointment that seemed to dry it up.    Review of Systems: A comprehensive review of systems was negative except as noted.     Medications, Allergies and Problem List   Reviewed, reconciled and updated  Post Discharge Medication Reconciliation Status:      Physical Exam   General Appearance:   Pleasant woman.  Mildly anxious.  She does  "not have any vesicles on her finger at this time.  The picture she shows me does have vesicles on the fingertip.  Her eye appears normal to me today.  Of note her concern was in her left eye.  Her right finger was affected.    /70 (Patient Site: Left Arm, Patient Position: Sitting, Cuff Size: Adult Regular)   Pulse (!) 58   Temp 96.8  F (36  C)   Ht 5' 4\" (1.626 m)   Wt 160 lb (72.6 kg)   LMP 07/29/2012   SpO2 95%   BMI 27.46 kg/m           Additional Information   Current Outpatient Medications   Medication Sig Dispense Refill     cholecalciferol, vitamin D3, (VITAMIN D3) 4,000 unit cap Take 1 capsule by mouth daily. [facility] BR[facility] only       cyanocobalamin (VITAMIN B-12) 50 mcg tablet Take 50 mcg by mouth daily.       multivitamin therapeutic tablet Take 1 tablet by mouth daily.       SOOLANTRA 1 % Crea SOM EXT TO FACE D FOR ROSACEA  5     SUMAtriptan (IMITREX) 100 MG tablet TAKE 1 TABLET (100 MG TOTAL) BY MOUTH EVERY 2 (TWO) HOURS AS NEEDED FOR MIGRAINE. 27 tablet 3     valACYclovir (VALTREX) 1000 MG tablet Take 1 tablet (1,000 mg total) by mouth 3 (three) times a day for 7 days. 21 tablet 0     No current facility-administered medications for this visit.      Allergies   Allergen Reactions     Augmentin [Amoxicillin-Pot Clavulanate]      Need to avoid amoxicillin, had non-specific drug rash to amox---okay for penicillin, see allergy note 1/4/16     Doxycycline      Itchy/rash on face, swollen in hands and feet     Simvastatin Rash     Amoxicillin Rash     Okay for penicillin---see allergy note from 1/4/16  Okay for penicillin---see allergy note from 1/4/16     Betamethasone Rash     Ciprofloxacin Rash     Iron Rash     Social History     Tobacco Use     Smoking status: Never Smoker     Smokeless tobacco: Never Used   Substance Use Topics     Alcohol use: No     Drug use: No       Review and/or order of clinical lab tests:  Review and/or order of radiology tests:  Review and/or order of medicine " tests:  Discussion of test results with performing physician:  Decision to obtain old records and/or obtain history from someone other than the patient:  Review and summarization of old records and/or obtaining history from someone other than the patient and.or discussion of case with another health care provider:  Independent visualization of image, tracing or specimen itself:    Time: not applicable     Matt Sung MD

## 2021-06-15 NOTE — PROGRESS NOTES
"  Office Visit - Follow Up   Hill Odom   52 y.o. female    Date of Visit: 12/26/2017    Chief Complaint   Patient presents with     Neuropathy     ongoing burning sensation bilateral feets and hands x 1 month - unsure if she is low on vitamins,etc         Assessment and Plan   1. Peripheral neuropathy  She has had extensive evaluation including EMG and nerve conduction studies etc.  Extensive evaluation by neurology.  Offered medications today however she declines.  She wants to get to the bottom of it\".  As above, will do lab work and consider follow-up with her neurologist to further discuss and evaluate.  If antihistamine seemed to improve her symptoms, I have suggested she take a daily nonsedating antihistamine such as Claritin or Zyrtec.  - Comprehensive Metabolic Panel  - HM2(CBC w/o Differential)  - Vitamin B12  - Thyroid Stimulating Hormone (TSH)  - Glycosylated Hemoglobin A1c    2. Neuropathic pain  As above.  Offered resumption of gabapentin or something like a tricyclic, however she declines        No Follow-up on file.     History of Present Illness   This 52 y.o. old this is a middle-aged woman who struggles with chronic somatic complaints probably manifestation of anxiety.  She is refused to accept that in the past.  She has had multiple providers.  She has had extensive evaluation for multiple somatic concerns such as numbness tingling burning abdominal discomfort etc.  She is felt that she has food allergies but at least a couple allergists have told her she does not have allergies.  She comes in today with a few months of burning discomfort in her hands and feet.  She also notes her hands and feet look red.  Worse after she eats.  She took an antihistamine and things got better.  She believes she has food allergies causing her symptoms.  She also \"wants her vitamins checked\".  She states she is feeling well otherwise.    Review of Systems: A comprehensive review of systems was negative except " "as noted.     Medications, Allergies and Problem List   Reviewed and updated     Physical Exam   General Appearance:   Pleasant middle-age woman.  Feet and hands appear normal.  Intact sensation.  She does not want me touching her feet.  She has 2 large plantar warts over her soles bilaterally.  No open sores.  Normal pulses.    /72 (Patient Site: Right Arm, Patient Position: Sitting, Cuff Size: Adult Regular)  Pulse 84  Ht 5' 4\" (1.626 m)  Wt 170 lb (77.1 kg)  LMP 07/29/2012  SpO2 99%  BMI 29.18 kg/m2         Additional Information   Current Outpatient Prescriptions   Medication Sig Dispense Refill     cholecalciferol, vitamin D3, (VITAMIN D3) 4,000 unit cap Take 1 capsule by mouth daily. [facility] BR[facility] only       cyanocobalamin (VITAMIN B-12) 50 mcg tablet Take 50 mcg by mouth daily.       multivitamin therapeutic tablet Take 1 tablet by mouth daily.       SUMAtriptan (IMITREX) 100 MG tablet TAKE 1 TABLET (100 MG TOTAL) BY MOUTH EVERY 2 (TWO) HOURS AS NEEDED FOR MIGRAINE. 27 tablet 3     No current facility-administered medications for this visit.      Allergies   Allergen Reactions     Amoxicillin      Okay for penicillin---see allergy note from 1/4/16     Augmentin [Amoxicillin-Pot Clavulanate]      Need to avoid amoxicillin, had non-specific drug rash to amox---okay for penicillin, see allergy note 1/4/16     Doxycycline      Itchy/rash on face, swollen in hands and feet     Simvastatin Rash     Betamethasone Rash     Ciprofloxacin Rash     Iron Rash     Social History   Substance Use Topics     Smoking status: Never Smoker     Smokeless tobacco: Never Used     Alcohol use No       Review and/or order of clinical lab tests:  Review and/or order of radiology tests:  Review and/or order of medicine tests:  Discussion of test results with performing physician:  Decision to obtain old records and/or obtain history from someone other than the patient:  Review and summarization of old records and/or obtaining " history from someone other than the patient and.or discussion of case with another health care provider:  Independent visualization of image, tracing or specimen itself:    Time: total time spent with the patient was 25 minutes of which >50% was spent in counseling and coordination of care     Matt Sung MD

## 2021-06-15 NOTE — TELEPHONE ENCOUNTER
Had shingles before and noticed similar rash on right hand. 4 dots on right hand.    Not right eye hurts too.    Needs to be seen today.    Transferred to scheduling for an appointment.    Jeannie Elizabeth RN [facility] Triage Nurse Advisor    Reason for Disposition    Shingles rash (matches SYMPTOMS) and weak immune system (e.g., HIV positive,  cancer chemotherapy, chronic steroid treatment, splenectomy) and NOT taking antiviral medication    Additional Information    Negative: Difficult to awaken or acting confused (e.g., disoriented, slurred speech)    Negative: Sounds like a life-threatening emergency to the triager    Negative: Localized rash and doesn't match the SYMPTOMS of shingles    Negative: Back pain and doesn't match the SYMPTOMS of shingles    Negative: Shingles Vaccine (Recombinant Zoster Vaccine; RZV; Shingrix), questions about    Negative: Shingles rash of face and eye pain or blurred vision    Negative: Shingles rash on the eyelid or tip of the nose    Negative: Shingles rash and spots start appearing other places on body    Negative: Patient sounds very sick or weak to the triager    Protocols used: SHINGLES-A-OH

## 2021-06-16 NOTE — PROGRESS NOTES
Office Visit - Physical   Hill Odom   55 y.o.  female    Date of visit: 3/25/2021  Physician: Matt Sung MD     Assessment and Plan   1. Routine general medical examination at a health care facility  Is active.  She is already had her 1 shingles vaccine.  I am unclear if she is going to get her second.  We discussed Covid vaccination today.  She is due for her bone density which will get set up.  - HM2(CBC w/o Differential)  - Lipid [place] FASTING  - Comprehensive Metabolic Panel  - Thyroid Cascade  - Urinalysis-UC if Indicated  - Hepatitis C Antibody (Anti-HCV)    2. Hypercholesterolemia  Lipids today for monitoring.  I would not recommend another heart scan until May be age 58.  - Lipid Cascade FASTING  - Thyroid [place]    3. Post-menopausal    - DXA Bone Density Scan; Future    4. Osteopenia    - DXA Bone Density Scan; Future    5. Rosacea  Continue regimen.  - Ambulatory referral to Dermatology    6. Vitamin B12 deficiency (non anemic)    - HM2(CBC w/o Differential)    7. Migraine without status migrainosus, not intractable, unspecified migraine type  Continue current regimen.  Good control.    8. Skin lesion  Reassured her today.  If recurs or worsens I would have her meet with dermatology.        Return in about 6 months (around 9/25/2021) for Recheck.     Chief Complaint   Chief Complaint   Patient presents with     Annual Exam     fasting     Referral     dermatologist      Advice Only     Had a previous CT calcium test in 2014 - ? if patient should get this recheck         Patient Profile   Social History     Social History Narrative     Not on file        Past Medical History   Patient Active Problem List   Diagnosis     Hypercholesterolemia     Rosacea     Migraine Headache     Dermatographia     Cholelithiasis     Neuropathic pain     Sensory disturbance     Polyarthralgia     Primary osteoarthritis of both knees     Benign neoplasm of colon     Epigastric pain     Hemorrhoids     Neoplasm  of uncertain behavior of stomach, intestines, and rectum     Numbness     Other chronic pain     Small fiber neuropathy     Special screening for malignant neoplasms, colon     Vitamin B12 deficiency (non anemic)     H/O: hysterectomy     Low bone mass       Past Surgical History  She has a past surgical history that includes pr supracerv abd hysterectomy; Hysterectomy; Breast biopsy (Left); Foot surgery; Blepharoplasty (Bilateral, 12/21/2015); and Hysterectomy.     History of Present Illness   This 55 y.o. old  Destinee joins me today for physical.  She reports things have been going fairly well.  She continues to have that lesion on her right index finger that is tingly and painful.  I thought perhaps it was a herpetic deysi.  It occurred when she got her shingles vaccine.  She is hoping will go away.  There is still little bit of the noticeable area and discomfort there.    She has been stable with her migraines.    She wonders if she should have another heart scan.  She has high cholesterol and had a clean heart scan with calcium score of 0 back at age 48 which was 7 years ago.    She tries to stay active.  She does not eat the best.  She eats a lot of frozen prepared meals.    Work is going well for her shared.  She works at the [employer].    Review of Systems: A comprehensive review of systems was negative except as noted.     Medications and Allergies   Current Outpatient Medications   Medication Sig Dispense Refill     cholecalciferol, vitamin D3, (VITAMIN D3) 4,000 unit cap Take 1 capsule by mouth daily. [facility] BRAND only       cyanocobalamin (VITAMIN B-12) 50 mcg tablet Take 50 mcg by mouth daily.       multivitamin therapeutic tablet Take 1 tablet by mouth daily.       SOOLANTRA 1 % Crea SOM EXT TO FACE D FOR ROSACEA  5     SUMAtriptan (IMITREX) 100 MG tablet TAKE 1 TABLET (100 MG TOTAL) BY MOUTH EVERY 2 (TWO) HOURS AS NEEDED FOR MIGRAINE. 27 tablet 3     No current facility-administered medications  "for this visit.      Allergies   Allergen Reactions     Augmentin [Amoxicillin-Pot Clavulanate]      Need to avoid amoxicillin, had non-specific drug rash to amox---okay for penicillin, see allergy note 1/4/16     Doxycycline      Itchy/rash on face, swollen in hands and feet     Simvastatin Rash     Amoxicillin Rash     Okay for penicillin---see allergy note from 1/4/16  Okay for penicillin---see allergy note from 1/4/16     Betamethasone Rash     Ciprofloxacin Rash     Iron Rash        Family and Social History   Family History   Problem Relation Age of Onset     Breast cancer Cousin      Hyperlipidemia Mother      Hyperlipidemia Maternal Uncle         Social History     Tobacco Use     Smoking status: Never Smoker     Smokeless tobacco: Never Used   Substance Use Topics     Alcohol use: No     Drug use: No        Physical Exam   General Appearance:   Woman who appears younger than her age.    /70 (Patient Site: Left Arm, Patient Position: Sitting, Cuff Size: Adult Regular)   Pulse 70   Temp 97.6  F (36.4  C)   Ht 5' 4\" (1.626 m)   Wt 163 lb (73.9 kg)   LMP 07/29/2012   SpO2 96%   BMI 27.98 kg/m      EYES: Eyelids, conjunctiva, and sclera were normal. Pupils were normal. Cornea, iris, and lens were normal bilaterally.  HEAD, EARS, NOSE, MOUTH, AND THROAT: Head and face were normal. Hearing was normal to voice and the ears were normal to external exam.  NECK: Neck appearance was normal. There were no neck masses and the thyroid was not enlarged.  RESPIRATORY: Breathing pattern was normal and the chest moved symmetrically.  Percussion/auscultatory percussion was normal.  Lung sounds were normal and there were no abnormal sounds.  CARDIOVASCULAR: Heart rate and rhythm were normal.  S1 and S2 were normal and there were no extra sounds or murmurs. Peripheral pulses in arms and legs were normal.  Jugular venous pressure was normal.  There was no peripheral edema.  GASTROINTESTINAL: The abdomen was normal in " contour.  Bowel sounds were present.  Percussion detected no organ enlargement or tenderness.  Palpation detected no tenderness, mass, or enlarged organs.   MUSCULOSKELETAL: Skeletal configuration was normal and muscle mass was normal for age. Joint appearance was overall normal.  LYMPHATIC: There were no enlarged nodes.  SKIN/HAIR/NAILS: Skin color was normal.  There were no skin lesions.  Hair and nails were normal.  NEUROLOGIC: The patient was alert and oriented to person, place, time, and circumstance. Speech was normal. Cranial nerves were normal. Motor strength was normal for age. The patient was normally coordinated.  PSYCHIATRIC:  Mood and affect were normal and the patient had normal recent and remote memory. The patient's judgment and insight were normal.    ADDITIONAL VITAL SIGNS: none  CHEST WALL/BREASTS: nml breasts and axillae bilaterally.    RECTAL: def  GENITAL/URINARY: def     Additional Information        Matt Sung MD  Internal Medicine  Contact me at [phone]

## 2021-06-16 NOTE — PROGRESS NOTES
Office Visit - Follow Up   Hill Odom   52 y.o. female    Date of Visit: 3/19/2018    Chief Complaint   Patient presents with     Consult     seen [facility] [place] on 3/14/18 - wants to discuss recent visit if pt should go with steriod treatments         Assessment and Plan   1. Erythromelalgia  If she did have improvement of her symptoms with the treatment recommended by the [facility] neurology she should proceed with that.  We discussed the potential downside of steroid use.  She will remain on omeprazole while getting the steroids.  We will also get a baseline bone density.    2. Neuropathic pain  As above.    3. Perimenopausal  Unclear if she is menopausal or not.  Check FSH and will do a bone density.  We discussed current recommendations for pelvic and ovarian cancer screening.  - Follicle Stimulating Hormone (FSH)  - DXA Bone Density Scan; Future    4. Right flank pain  Unclear as to the etiology of this.  I question whether this could be something autonomic as well.  She should proceed with the testing planned by neurology.  Let me know if things worsen or do not improve.        Return in about 3 months (around 6/19/2018) for Recheck.     History of Present Illness   This 52 y.o. old patient comes in today for follow-up of her multiple issues.  Patient is relatively new to me having come visit me for the first time about a year ago.  She is dealt with multiple somatic concerns over the years and has been sent to multiple providers.  A lot of her issues I believe stem from anxiety but she is been unwilling to believe that.  He had shingles earlier this year and ever since then she has had this burning and redness of her feet.  There is concerned that she has erythromelalgia.  She was given a steroid infusion actually for 3 days in a row and she believes she had improvement in her symptoms.  The plan from the [facility] neurologist that she saw is to have 12 weeks of intermittent IV steroids.   "She is concerned if she should do this.  She is also supposed to be having some autonomic neuropathy testing.  That has not been completed yet.    She is also very worried about this right flank pain.  She lays down and she will sometimes develop right flank pain and then developed sweatiness.  She will then turn over in bed and things will get better.  She wonders what causes all of that.  She has had extensive GI and imaging tests over the last year which did not show any abnormalities on that side.    She also wonders if she is menopausal and whether she should have Paps and pelvic exams.  She is status post hysterectomy that was supracervical.  She has never had an abnormal Pap.  She does not know if she still cycles or not.  She does not really have hot flashes she does not think.    Review of Systems: A comprehensive review of systems was negative except as noted.     Medications, Allergies and Problem List   Reviewed and updated     Physical Exam   General Appearance:   Pleasant somewhat anxious woman in no distress.    /64 (Patient Site: Right Arm, Patient Position: Sitting, Cuff Size: Adult Regular)  Pulse 74  Ht 5' 4\" (1.626 m)  Wt 173 lb (78.5 kg)  LMP 07/29/2012  SpO2 98%  BMI 29.7 kg/m2         Additional Information   Current Outpatient Prescriptions   Medication Sig Dispense Refill     cholecalciferol, vitamin D3, (VITAMIN D3) 4,000 unit cap Take 1 capsule by mouth daily. [facility] BRAND only       cyanocobalamin (VITAMIN B-12) 50 mcg tablet Take 50 mcg by mouth daily.       multivitamin therapeutic tablet Take 1 tablet by mouth daily.       omeprazole (PRILOSEC) 20 MG capsule Take 1 capsule (20 mg total) by mouth daily before breakfast. 14 capsule 0     SUMAtriptan (IMITREX) 100 MG tablet TAKE 1 TABLET (100 MG TOTAL) BY MOUTH EVERY 2 (TWO) HOURS AS NEEDED FOR MIGRAINE. 27 tablet 3     No current facility-administered medications for this visit.      Allergies   Allergen Reactions     " Amoxicillin      Okay for penicillin---see allergy note from 1/4/16     Augmentin [Amoxicillin-Pot Clavulanate]      Need to avoid amoxicillin, had non-specific drug rash to amox---okay for penicillin, see allergy note 1/4/16     Doxycycline      Itchy/rash on face, swollen in hands and feet     Simvastatin Rash     Betamethasone Rash     Ciprofloxacin Rash     Iron Rash     Social History   Substance Use Topics     Smoking status: Never Smoker     Smokeless tobacco: Never Used     Alcohol use No       Review and/or order of clinical lab tests:  Review and/or order of radiology tests:  Review and/or order of medicine tests:  Discussion of test results with performing physician:  Decision to obtain old records and/or obtain history from someone other than the patient:  Review and summarization of old records and/or obtaining history from someone other than the patient and.or discussion of case with another health care provider:  Independent visualization of image, tracing or specimen itself:    Time: total time spent with the patient was 25 minutes of which >50% was spent in counseling and coordination of care     Matt Sung MD

## 2021-06-16 NOTE — PROGRESS NOTES
Subjective findings: The patient returns to the clinic today complaining of painful calluses on the bottom of both feet.       Objective findings: Nails bilateral feet normally and color.  Skin bilateral feet warm and intact.  There are painful hyperkeratotic nucleated lesions sub-fourth metatarsal head both feet.  There is also a hyperkeratotic lesion on the plantar aspect of the right great toe.  DP and PT pulses +2 over 4 bilateral feet.  Capillary refill less than 2 seconds bilateral feet.  Negative clonus, negative Babinski bilateral feet.  Range of motion within normal limits bilateral feet.  Muscle power +5 over 5 bilaterally in all compartments.     Assessment: Intractable plantar keratoma both feet     Plan: Debrided hyperkeratotic lesions both feet.  I have recommended the patient return to the clinic as needed for continued foot care.

## 2021-06-16 NOTE — PROGRESS NOTES
Office Visit - Physical   Hill Odom   52 y.o.  female    Date of visit: 2/26/2018  Physician: Matt Sung MD     Assessment and Plan   1. Routine general medical examination at a health care facility  Patient is up-to-date on her health maintenance.  She has been exercising regularly.  I have urged her to continue to do same.  - HM2(CBC w/o Differential)  - Urinalysis-UC if Indicated  - Lipid Cascade  - Comprehensive Metabolic Panel    2. Sensory disturbance  See below.    3. Migraine  Renewed her Imitrex today.    4. Hyperlipidemia, unspecified hyperlipidemia type    - Lipid Cascade    5. Right flank pain, chronic  Nothing seen on exam.  Possibly musculoskeletal.  I suggested omeprazole for 14 days given her recent aspirin use and upcoming steroid use.  See if this makes a difference as well.  Let me know if things worsen or persist.  She has had extensive testing and evaluation for similar concerns in the recent past.  - HM2(CBC w/o Differential)  - Urinalysis-UC if Indicated  - Comprehensive Metabolic Panel    6. Erythromelalgia  She will be getting a trial of IV Solu-Medrol.  We discussed what to expect from this medication.  Again she will take 2 weeks of Prilosec for stomach protection.    The following high BMI interventions were performed this visit: encouragement to exercise    Return in about 6 months (around 8/26/2018), or if symptoms worsen or fail to improve, for Recheck.     Chief Complaint   Chief Complaint   Patient presents with     Annual Exam        Patient Profile   Social History     Social History Narrative        Past Medical History   Patient Active Problem List   Diagnosis     Hyperlipidemia     Rosacea     Migraine Headache     Dermatographia     Cholelithiasis     Neuropathic pain     Sensory disturbance       Past Surgical History  She has a past surgical history that includes pr supracerv abd hysterectomy; Hysterectomy; Breast biopsy (Left); Foot surgery; Blepharoplasty  (Bilateral, 12/21/2015); and Hysterectomy.     History of Present Illness   This 52 y.o. old Destinee comes in today for physical.  In addition she has other concerns for me today.  She notes that for the last month or 2 she has noticed some right flank pain.  Located on the right side laterally and somewhat posteriorly.  Occurs when she lays down at night.  Laying on her back or her left side seems to make it better.  Lying on her right side makes it worse.  She thought perhaps it was due to her working out.  She does have a history of chronic back pains/abdominal pains for which she has been seeing multiple providers.  She had her gallbladder removed.  She does not believe that this symptom is her gallbladder type pains.  There is some nausea associated with it.    She was also diagnosed with possible erythromelalgia.  She is going to be getting some IV steroids over the next few days to see if that makes any difference.  She complains of burning feet with this.  She has had long history of various neuropathic concerns but she states these are new issues after her recent shingles.    Review of Systems: A comprehensive review of systems was negative except as noted.     Medications and Allergies   Current Outpatient Prescriptions   Medication Sig Dispense Refill     cholecalciferol, vitamin D3, (VITAMIN D3) 4,000 unit cap Take 1 capsule by mouth daily. [facility] BRAND only       cyanocobalamin (VITAMIN B-12) 50 mcg tablet Take 50 mcg by mouth daily.       multivitamin therapeutic tablet Take 1 tablet by mouth daily.       omeprazole (PRILOSEC) 20 MG capsule Take 1 capsule (20 mg total) by mouth daily before breakfast. 14 capsule 0     SUMAtriptan (IMITREX) 100 MG tablet TAKE 1 TABLET (100 MG TOTAL) BY MOUTH EVERY 2 (TWO) HOURS AS NEEDED FOR MIGRAINE. 27 tablet 3     No current facility-administered medications for this visit.      Allergies   Allergen Reactions     Amoxicillin      Okay for penicillin---see allergy note from  "1/4/16     Augmentin [Amoxicillin-Pot Clavulanate]      Need to avoid amoxicillin, had non-specific drug rash to amox---okay for penicillin, see allergy note 1/4/16     Doxycycline      Itchy/rash on face, swollen in hands and feet     Simvastatin Rash     Betamethasone Rash     Ciprofloxacin Rash     Iron Rash        Family and Social History   Family History   Problem Relation Age of Onset     Breast cancer Cousin      Hyperlipidemia Mother      Hyperlipidemia Maternal Uncle         Social History   Substance Use Topics     Smoking status: Never Smoker     Smokeless tobacco: Never Used     Alcohol use No        Physical Exam   General Appearance:   Pleasant middle-aged woman in no distress.  She looks well.    /70 (Patient Site: Right Arm, Patient Position: Sitting, Cuff Size: Adult Regular)  Pulse 72  Ht 5' 4\" (1.626 m)  Wt 170 lb (77.1 kg)  LMP 07/29/2012  SpO2 98%  BMI 29.18 kg/m2    EYES: Eyelids, conjunctiva, and sclera were normal. Pupils were normal. Cornea, iris, and lens were normal bilaterally.  HEAD, EARS, NOSE, MOUTH, AND THROAT: Head and face were normal. Hearing was normal to voice and the ears were normal to external exam. Nose appearance was normal and there was no discharge. Oropharynx was normal.  NECK: Neck appearance was normal. There were no neck masses and the thyroid was not enlarged.  RESPIRATORY: Breathing pattern was normal and the chest moved symmetrically.  Percussion/auscultatory percussion was normal.  Lung sounds were normal and there were no abnormal sounds.  CARDIOVASCULAR: Heart rate and rhythm were normal.  S1 and S2 were normal and there were no extra sounds or murmurs. Peripheral pulses in arms and legs were normal.  Jugular venous pressure was normal.  There was no peripheral edema.  GASTROINTESTINAL: The abdomen was normal in contour.  Bowel sounds were present.  Percussion detected no organ enlargement or tenderness.  Palpation detected no tenderness, mass, or " enlarged organs.   MUSCULOSKELETAL: Skeletal configuration was normal and muscle mass was normal for age. Joint appearance was overall normal.  LYMPHATIC: There were no enlarged nodes.  SKIN/HAIR/NAILS: Skin color was normal.  There were no skin lesions.  Hair and nails were normal.  NEUROLOGIC: The patient was alert and oriented to person, place, time, and circumstance. Speech was normal. Cranial nerves were normal. Motor strength was normal for age. The patient was normally coordinated.  PSYCHIATRIC:  Mood and affect were normal and the patient had normal recent and remote memory. The patient's judgment and insight were normal.    ADDITIONAL VITAL SIGNS: None  CHEST WALL/BREASTS: Normal breast and axilla.  RECTAL: Deferred  GENITAL/URINARY: Deferred     Additional Information        Matt Sung MD  Internal Medicine  Contact me at [phone]

## 2021-06-17 NOTE — PROGRESS NOTES
"  Office Visit - Follow Up   Hill Odom   52 y.o. female    Date of Visit: 4/13/2018    Chief Complaint   Patient presents with     Flank Pain     right side getting more progressive- worse after eating-        Assessment and Plan   1. Right sided abdominal pain  Etiology uncertain.  Check labs and CT as below.  If unremarkable suggested acetaminophen, rest, heat, ice, physical therapy follow-up with Dr. Matt Sung  - HM2(CBC w/o Differential)  - Comprehensive Metabolic Panel  - Urinalysis-UC if Indicated  - CT Abdomen Pelvis Without Oral Without IV Contrast; Future  - Culture, Urine    Return in about 1 week (around 4/20/2018) for follow up with PCP.     History of Present Illness   This 52 y.o. old woman comes in for evaluation of right-sided flank and abdominal pain.  Pain is been going on for about 2 months but seems to be worsening recently.  Pain is constant.  She cannot lie on her right side.  She feels nauseated when she lies in the right side.  She has a burning sensation in the stomach.  Hurts in the middle abdomen as well.  She has not vomited.  She has not had fever.  She has chills about 4 times per day for the past 3 weeks.  She had her gallbladder out in 2017, it sounds like similar pain.  She has had MRI of her thoracic spine evaluating similar pain, unremarkable.  She has had no weight changes.  There is no urinary symptoms.  History of kidney stones.  No blood in her urine.    Review of Systems: A comprehensive review of systems was negative except as noted.     Medications, Allergies and Problem List   Reviewed and updated     Physical Exam   General Appearance:   No acute distress    /78 (Patient Site: Right Arm, Patient Position: Sitting)  Pulse 87  Ht 5' 4\" (1.626 m)  Wt 171 lb (77.6 kg)  LMP 07/29/2012  SpO2 98%  BMI 29.35 kg/m2    HEENT exam is unremarkable  Neck supple no thyromegaly or nodule palpable  Lymphatic no cervical lymphadenopathy  Cardiovascular regular rate and " rhythm no murmur gallop or rub  Pulmonary lungs are clear to auscultation bilaterally  Gastrointestinall abdomen soft nontender nondistended no organomegaly  Neurologic exam is non focal  Psychiatric pleasant, no confusion or agitation        Additional Information   Current Outpatient Prescriptions   Medication Sig Dispense Refill     cholecalciferol, vitamin D3, (VITAMIN D3) 4,000 unit cap Take 1 capsule by mouth daily. [facility] BRAND only       multivitamin therapeutic tablet Take 1 tablet by mouth daily.       omeprazole (PRILOSEC) 20 MG capsule Take 1 capsule (20 mg total) by mouth daily before breakfast. 14 capsule 0     cyanocobalamin (VITAMIN B-12) 50 mcg tablet Take 50 mcg by mouth daily.       SUMAtriptan (IMITREX) 100 MG tablet TAKE 1 TABLET (100 MG TOTAL) BY MOUTH EVERY 2 (TWO) HOURS AS NEEDED FOR MIGRAINE. 27 tablet 3     No current facility-administered medications for this visit.      Allergies   Allergen Reactions     Amoxicillin      Okay for penicillin---see allergy note from 1/4/16     Augmentin [Amoxicillin-Pot Clavulanate]      Need to avoid amoxicillin, had non-specific drug rash to amox---okay for penicillin, see allergy note 1/4/16     Doxycycline      Itchy/rash on face, swollen in hands and feet     Simvastatin Rash     Betamethasone Rash     Ciprofloxacin Rash     Iron Rash     Social History   Substance Use Topics     Smoking status: Never Smoker     Smokeless tobacco: Never Used     Alcohol use No       Review and/or order of clinical lab tests:  Review and/or order of radiology tests:  Review and/or order of medicine tests:  Discussion of test results with performing physician:  Decision to obtain old records and/or obtain history from someone other than the patient:  Review and summarization of old records and/or obtaining history from someone other than the patient and.or discussion of case with another health care provider:  Independent visualization of image, tracing or specimen  itself:    Time:      London Javier MD

## 2021-06-18 NOTE — LETTER
Letter by Matt Sung MD at      Author: Matt Sung MD Service: -- Author Type: --    Filed:  Encounter Date: 3/5/2019 Status: (Other)       Hill Odom  [address] [place] [address]             March 5, 2019         Dear Ms. Odom,    Below are the results from your recent visit:    Resulted Orders   Comprehensive Metabolic Panel   Result Value Ref Range    Sodium 140 136 - 145 mmol/L    Potassium 3.9 3.5 - 5.0 mmol/L    Chloride 105 98 - 107 mmol/L    CO2 26 22 - 31 mmol/L    Anion Gap, Calculation 9 5 - 18 mmol/L    Glucose 73 70 - 125 mg/dL    BUN 14 8 - 22 mg/dL    Creatinine 0.81 0.60 - 1.10 mg/dL    GFR MDRD Af Amer >60 >60 mL/min/1.73m2    GFR MDRD Non Af Amer >60 >60 mL/min/1.73m2    Bilirubin, Total 0.6 0.0 - 1.0 mg/dL    Calcium 9.3 8.5 - 10.5 mg/dL    Protein, Total 6.9 6.0 - 8.0 g/dL    Albumin 3.7 3.5 - 5.0 g/dL    Alkaline Phosphatase 98 45 - 120 U/L    AST 17 0 - 40 U/L    ALT 15 0 - 45 U/L    Narrative    Fasting Glucose reference range is 70-99 mg/dL per  American Diabetes Association (ADA) guidelines.   HM2(CBC w/o Differential)   Result Value Ref Range    WBC 5.8 4.0 - 11.0 thou/uL    RBC 4.59 3.80 - 5.40 mill/uL    Hemoglobin 13.1 12.0 - 16.0 g/dL    Hematocrit 38.5 35.0 - 47.0 %    MCV 84 80 - 100 fL    MCH 28.6 27.0 - 34.0 pg    MCHC 34.1 32.0 - 36.0 g/dL    RDW 13.9 11.0 - 14.5 %    Platelets 319 140 - 440 thou/uL    MPV 7.8 7.0 - 10.0 fL   Lipid Cascade   Result Value Ref Range    Cholesterol 247 (H) <=199 mg/dL    Triglycerides 65 <=149 mg/dL    HDL Cholesterol 62 >=50 mg/dL    LDL Calculated 172 (H) <=129 mg/dL    Patient Fasting > 8hrs? Yes    Vitamin B12   Result Value Ref Range    Vitamin B-12 578 213 - 816 pg/mL   Vitamin D, Total (25-Hydroxy)   Result Value Ref Range    Vitamin D, Total (25-Hydroxy) 36.6 30.0 - 80.0 ng/mL    Narrative    Deficiency <10.0 ng/mL  Insufficiency 10.0-29.9 ng/mL  Sufficiency 30.0-80.0 ng/mL  Toxicity (possible) >100.0 ng/mL   Urinalysis  Macroscopic   Result Value Ref Range    Color, UA Yellow Colorless, Yellow, Straw, Light Yellow    Clarity, UA Clear Clear    Glucose, UA Negative Negative    Bilirubin, UA Negative Negative    Ketones, UA Negative Negative    Specific Gravity, UA 1.025 1.005 - 1.030    Blood, UA Trace (!) Negative    pH, UA 5.5 5.0 - 8.0    Protein, UA Negative Negative mg/dL    Urobilinogen, UA 0.2 E.U./dL 0.2 E.U./dL, 1.0 E.U./dL    Nitrite, UA Negative Negative    Leukocytes, UA Negative Negative    Narrative    Microscopic ordered   Urine,Microscopic   Result Value Ref Range    Bacteria, UA Few (!) None Seen hpf    RBC, UA 0-2 None Seen, 0-2 hpf    WBC, UA 0-5 None Seen, 0-5 hpf    Squam Epithel, UA 0-5 None Seen, 0-5 lpf    Mucus, UA Few (!) None Seen lpf    Ca Oxalate Klarissa, UA Present (!) None Seen       Vitamin D level looks good.  B12 is normal as well.  Kidney and liver tests are fine.  Cholesterol is high.  Please be better about the diet--focus on a Mediterranean diet.  Blood counts are normal.  Do not be concerned about the flags in the urinalysis.     Please call with questions or contact us using [employer].    Sincerely,        Electronically signed by Matt Sung MD

## 2021-06-19 NOTE — LETTER
Letter by Matt Sung MD at      Author: Matt Sung MD Service: -- Author Type: --    Filed:  Encounter Date: 8/29/2019 Status: (Other)         Hill Odom  [address] [place] [address]             August 29, 2019         Dear Ms. Odom,    Below are the results from your recent visit:    Resulted Orders   Comprehensive Metabolic Panel   Result Value Ref Range    Sodium 139 136 - 145 mmol/L    Potassium 4.3 3.5 - 5.0 mmol/L    Chloride 103 98 - 107 mmol/L    CO2 24 22 - 31 mmol/L    Anion Gap, Calculation 12 5 - 18 mmol/L    Glucose 76 70 - 125 mg/dL    BUN 16 8 - 22 mg/dL    Creatinine 0.86 0.60 - 1.10 mg/dL    GFR MDRD Af Amer >60 >60 mL/min/1.73m2    GFR MDRD Non Af Amer >60 >60 mL/min/1.73m2    Bilirubin, Total 0.6 0.0 - 1.0 mg/dL    Calcium 9.9 8.5 - 10.5 mg/dL    Protein, Total 7.1 6.0 - 8.0 g/dL    Albumin 4.1 3.5 - 5.0 g/dL    Alkaline Phosphatase 129 (H) 45 - 120 U/L    AST 18 0 - 40 U/L    ALT 17 0 - 45 U/L    Narrative    Fasting Glucose reference range is 70-99 mg/dL per  American Diabetes Association (ADA) guidelines.   HM2(CBC w/o Differential)   Result Value Ref Range    WBC 6.1 4.0 - 11.0 thou/uL    RBC 4.65 3.80 - 5.40 mill/uL    Hemoglobin 13.6 12.0 - 16.0 g/dL    Hematocrit 39.9 35.0 - 47.0 %    MCV 86 80 - 100 fL    MCH 29.2 27.0 - 34.0 pg    MCHC 34.0 32.0 - 36.0 g/dL    RDW 12.4 11.0 - 14.5 %    Platelets 339 140 - 440 thou/uL    MPV 7.8 7.0 - 10.0 fL   Urinalysis-UC if Indicated   Result Value Ref Range    Color, UA Yellow Colorless, Yellow, Straw, Light Yellow    Clarity, UA Clear Clear    Glucose, UA Negative Negative    Bilirubin, UA Negative Negative    Ketones, UA Negative Negative    Specific Gravity, UA 1.020 1.005 - 1.030    Blood, UA Negative Negative    pH, UA 5.5 5.0 - 8.0    Protein, UA Negative Negative mg/dL    Urobilinogen, UA 0.2 E.U./dL 0.2 E.U./dL, 1.0 E.U./dL    Nitrite, UA Negative Negative    Leukocytes, UA Negative Negative    Narrative    Microscopic  not indicated  UC not indicated   Lipid Cascade   Result Value Ref Range    Cholesterol 273 (H) <=199 mg/dL    Triglycerides 63 <=149 mg/dL    HDL Cholesterol 63 >=50 mg/dL    LDL Calculated 197 (H) <=129 mg/dL    Patient Fasting > 8hrs? Yes    Vitamin B12   Result Value Ref Range    Vitamin B-12 557 213 - 816 pg/mL   Lipase   Result Value Ref Range    Lipase 12 0 - 52 U/L       Destinee, all of your tests are normal except for very high cholesterol and one mildly elevated liver enzyme that is found in bile ducts.  It is also found in bone and intestine.  Because of your pain in your right side I am going to recommend a liver ultrasound for further evaluation.  As far as the cholesterol goes, current guidelines would recommend that we get you on medicines at this time to prevent heart attacks and strokes.  That would be in addition to a healthy Mediterranean diet and exercise.  Would you be willing to start something at this time?     Please call with questions or contact us using [employer].    Sincerely,        Electronically signed by Matt Sung MD

## 2021-06-19 NOTE — LETTER
Letter by Matt Sung MD at      Author: Matt Sung MD Service: -- Author Type: --    Filed:  Encounter Date: 10/15/2019 Status: Signed         Hill Odom  [address] [place] [address]             October 15, 2019         Dear Ms. Odom,    Below are the results from your recent visit:    Resulted Orders   Mammo Screening Bilateral    Narrative    BILATERAL FULL FIELD DIGITAL SCREENING MAMMOGRAM WITH TOMOSYNTHESIS    Performed on: 10/14/19      Compared to: 10/02/2018 Mammo Screening Bilateral, 08/24/2017 Mammo   Screening Bilateral, 08/12/2016 Mammo Screening Bilateral, and 08/11/2015   Mammo Screening Bilateral    Findings:The breasts are heterogeneously dense, which may obscure small   masses.There is no radiographic evidence of malignancy.  There are benign   appearing calcifications.  This study was evaluated with the assistance of   Computer-Aided Detection.  Breast Tomosynthesis was used in   interpretation. Repeat routine screening mammogram in one year is   recommended.    ACR BI-RADS Category 2: Benign       Mammogram was negative / normal.     Please call with questions or contact us using [employer].    Sincerely,        Electronically signed by Matt Sung MD

## 2021-06-19 NOTE — LETTER
Letter by Matt Sung MD at      Author: Matt Sung MD Service: -- Author Type: --    Filed:  Encounter Date: 12/4/2019 Status: Signed         Hill Odom  [address] [place] [address]             December 4, 2019         Dear Ms. Odom,    Below are the results from your recent visit:    Resulted Orders   Hepatic Profile   Result Value Ref Range    Bilirubin, Total 0.7 0.0 - 1.0 mg/dL    Bilirubin, Direct 0.2 <=0.5 mg/dL    Protein, Total 6.7 6.0 - 8.0 g/dL    Albumin 3.8 3.5 - 5.0 g/dL    Alkaline Phosphatase 108 45 - 120 U/L    AST 17 0 - 40 U/L    ALT 16 0 - 45 U/L   Lipid Cascade FASTING   Result Value Ref Range    Cholesterol 236 (H) <=199 mg/dL    Triglycerides 97 <=149 mg/dL    HDL Cholesterol 53 >=50 mg/dL    LDL Calculated 164 (H) <=129 mg/dL    Patient Fasting > 8hrs? Yes        These numbers look better Destinee.  However I am confused.  Are you taking the rosuvastatin?  Or did these numbers get better because of dietary changes?     Please call with questions or contact us using [email or web address].    Sincerely,        Electronically signed by Matt Sung MD

## 2021-06-19 NOTE — LETTER
Letter by Matt Sung MD at      Author: Matt Sung MD Service: -- Author Type: --    Filed:  Encounter Date: 9/6/2019 Status: (Other)         Hill Odom  [address] [place] [address]             September 6, 2019         Dear Ms. Odom,    Below are the results from your recent visit:    Resulted Orders   US Abdomen Limited    Narrative    EXAM: US ABDOMEN LIMITED  LOCATION: [facility]  DATE/TIME: 9/5/2019 7:28 AM    INDICATION: Unspecified abdominal pain  COMPARISON: 10/28/2016    FINDINGS:  GALLBLADDER: Surgically absent.  BILE DUCTS: There is mild extrahepatic bile duct dilation. The common bile duct measures 8 mm in maximum diameter but tapers towards the ampulla.  LIVER: Normal where seen.  RIGHT KIDNEY: No hydronephrosis.  PANCREAS: Not imaged in detail on this limited study. No incidental abnormalities.    No ascites in the right upper quadrant.      Impression    CONCLUSION:  1.  Since the prior examination, a cholecystectomy has been performed. There is mild dilation of the extrahepatic bile ducts, most likely due to postcholecystectomy state.       Ultrasound is normal for someone who has had a gallbladder removed.  I would like to recheck that alkaline phosphatase in the couple weeks to see if it goes up or down.     Please call with questions or contact us using [employer].    Sincerely,        Electronically signed by Matt Sung MD

## 2021-06-19 NOTE — LETTER
Letter by Matt Sung MD at      Author: Matt Sung MD Service: -- Author Type: --    Filed:  Encounter Date: 9/25/2019 Status: Signed         Hill Odom  [address] [place] [address]             September 25, 2019         Dear Ms. Odom,    Below are the results from your recent visit:    Resulted Orders   Alkaline Phosphatase   Result Value Ref Range    Alkaline Phosphatase 102 45 - 120 U/L     Alkaline phosphatase is now normal.  Good news.     Please call with questions or contact us using [email or web address].    Sincerely,        Electronically signed by Matt Sung MD

## 2021-06-20 NOTE — PROGRESS NOTES
Office Visit - Follow Up   Hill Odom   53 y.o. female    Date of Visit: 8/28/2018    Chief Complaint   Patient presents with     Follow-up     6 mo f/u - completed steorid infusion end of July      Ear Fullness     check rt ear for wax        Assessment and Plan   1. Neuropathic pain  Improved after steroid infusions per neurology.  Continue to follow with them.    2. Sensory disturbance  As above.    3. Abnormal weight gain  Check thyroid.  We discussed elimination of high fructose corn syrup and soda pop.  We discussed limiting simple carbohydrates.  - Thyroid Cascade    4. Hypercholesteremia  She is not fasting but will have fasting labs next visit.    5. Hearing loss of right ear, unspecified hearing loss type  We will irrigate that right ear and see if that helps things if not then have her see ENT.    6. History of herpes zoster  She should get the zoster vaccine.  Current recommendations are to have at 3 years after shingles episode.  I discussed that with her today.        Return in about 6 months (around 2/28/2019) for Annual physical.     History of Present Illness   This 53 y.o. old patient comes in today for follow-up.  She reports she has been feeling fairly good since I saw her last.  She is status post several infusions of fairly high-dose steroids for erythromelalgia.  She reports that her pains are now gone.  She is pleased about that.  She gained some weight over the summer and attributes to that.  She states that she does not taken very many calories in a day and does a lot of exercise but still gains weight.  She likes having a Mountain Dew every morning.    She had a nice time in [facility] this summer.    She notes decreased hearing in her right ear.  She wonders if she has wax.    Review of Systems: A comprehensive review of systems was negative except as noted.     Medications, Allergies and Problem List   Reviewed and updated     Physical Exam   General Appearance:   Pleasant  "woman.  No distress.  Weight is up 6 pounds from last visit.  Vitals are noted.  Mild amount of cerumen in each ear canal.  No obvious cerumen impaction.    /80 (Patient Site: Right Arm, Patient Position: Sitting, Cuff Size: Adult Regular)  Pulse 80  Ht 5' 4\" (1.626 m)  Wt 177 lb (80.3 kg)  LMP 07/29/2012  SpO2 98%  BMI 30.38 kg/m2         Additional Information   Current Outpatient Prescriptions   Medication Sig Dispense Refill     cholecalciferol, vitamin D3, (VITAMIN D3) 4,000 unit cap Take 1 capsule by mouth daily. [employer] BRAND only       cyanocobalamin (VITAMIN B-12) 50 mcg tablet Take 50 mcg by mouth daily.       multivitamin therapeutic tablet Take 1 tablet by mouth daily.       SUMAtriptan (IMITREX) 100 MG tablet TAKE 1 TABLET (100 MG TOTAL) BY MOUTH EVERY 2 (TWO) HOURS AS NEEDED FOR MIGRAINE. 27 tablet 3     No current facility-administered medications for this visit.      Allergies   Allergen Reactions     Amoxicillin      Okay for penicillin---see allergy note from 1/4/16     Augmentin [Amoxicillin-Pot Clavulanate]      Need to avoid amoxicillin, had non-specific drug rash to amox---okay for penicillin, see allergy note 1/4/16     Doxycycline      Itchy/rash on face, swollen in hands and feet     Simvastatin Rash     Betamethasone Rash     Ciprofloxacin Rash     Iron Rash     Social History   Substance Use Topics     Smoking status: Never Smoker     Smokeless tobacco: Never Used     Alcohol use No       Review and/or order of clinical lab tests:  Review and/or order of radiology tests:  Review and/or order of medicine tests:  Discussion of test results with performing physician:  Decision to obtain old records and/or obtain history from someone other than the patient:  Review and summarization of old records and/or obtaining history from someone other than the patient and.or discussion of case with another health care provider:  Independent visualization of image, tracing or specimen " itself:    Time: total time spent with the patient was 25 minutes of which >50% was spent in counseling and coordination of care     Matt Sung MD

## 2021-06-20 NOTE — LETTER
Letter by Matt Sung MD at      Author: Matt Sung MD Service: -- Author Type: --    Filed:  Encounter Date: 9/25/2020 Status: (Other)         Hill Odom  [address] [place] [address]             September 25, 2020         Dear Ms. Odom,    Below are the results from your recent visit:    Resulted Orders   Lipid Cascade   Result Value Ref Range    Cholesterol 245 (H) <=199 mg/dL    Triglycerides 68 <=149 mg/dL    HDL Cholesterol 57 >=50 mg/dL    LDL Calculated 174 (H) <=129 mg/dL    Patient Fasting > 8hrs? Yes    Comprehensive Metabolic Panel   Result Value Ref Range    Sodium 139 136 - 145 mmol/L    Potassium 4.3 3.5 - 5.0 mmol/L    Chloride 105 98 - 107 mmol/L    CO2 25 22 - 31 mmol/L    Anion Gap, Calculation 9 5 - 18 mmol/L    Glucose 88 70 - 125 mg/dL    BUN 12 8 - 22 mg/dL    Creatinine 0.75 0.60 - 1.10 mg/dL    GFR MDRD Af Amer >60 >60 mL/min/1.73m2    GFR MDRD Non Af Amer >60 >60 mL/min/1.73m2    Bilirubin, Total 0.5 0.0 - 1.0 mg/dL    Calcium 9.2 8.5 - 10.5 mg/dL    Protein, Total 6.5 6.0 - 8.0 g/dL    Albumin 3.9 3.5 - 5.0 g/dL    Alkaline Phosphatase 108 45 - 120 U/L    AST 15 0 - 40 U/L    ALT 12 0 - 45 U/L    Narrative    Fasting Glucose reference range is 70-99 mg/dL per  American Diabetes Association (ADA) guidelines.   HM2(CBC w/o Differential)   Result Value Ref Range    WBC 5.6 4.0 - 11.0 thou/uL    RBC 4.61 3.80 - 5.40 mill/uL    Hemoglobin 13.4 12.0 - 16.0 g/dL    Hematocrit 40.0 35.0 - 47.0 %    MCV 87 80 - 100 fL    MCH 29.0 27.0 - 34.0 pg    MCHC 33.5 32.0 - 36.0 g/dL    RDW 12.3 11.0 - 14.5 %    Platelets 345 140 - 440 thou/uL    MPV 7.8 7.0 - 10.0 fL   COVID-19 Virus (Coronavirus) Antibody Screen, IgG   Result Value Ref Range    COVID -19 Erin IgG  Negative       Comment:      Negative results do not rule out SARS-CoV-2 infection, particularly in those who have been in contact with the virus. Follow-up testing with a molecular diagnostic should be considered to rule out  infection in these individuals. Results from antibody testing should not be used as the sole basis to diagnose or exclude SARS-CoV-2 infection or to inform infection status.  This automated Chemiluminescent microparticle immunoassay (CMIA) by [employer] on the  [id number] instrument has been given Emergency Use Authorization (EUA).The performance characteristics of this test were determined by [facility]. The laboratory is regulated under CLIA as qualified to perform high-complexity testing.This test is used for clinical purposes.It should not be regarded as investigational or for research.       COVID antibodies are negative.  Cholesterol is stable.  Everything else here is normal.     Please call with questions or contact us using My[employer].    Sincerely,        Electronically signed by Matt Sung MD

## 2021-06-20 NOTE — LETTER
Letter by Matt Sung MD at      Author: Matt Sung MD Service: -- Author Type: --    Filed:  Encounter Date: 3/5/2020 Status: (Other)         Hill Odom  [address] [place] [address]             March 5, 2020         Dear Ms. Odom,    Below are the results from your recent visit:    Resulted Orders   Lipid Cascade   Result Value Ref Range    Cholesterol 239 (H) <=199 mg/dL    Triglycerides 70 <=149 mg/dL    HDL Cholesterol 58 >=50 mg/dL    LDL Calculated 167 (H) <=129 mg/dL    Patient Fasting > 8hrs? Yes    Comprehensive Metabolic Panel   Result Value Ref Range    Sodium 140 136 - 145 mmol/L    Potassium 3.6 3.5 - 5.0 mmol/L    Chloride 105 98 - 107 mmol/L    CO2 25 22 - 31 mmol/L    Anion Gap, Calculation 10 5 - 18 mmol/L    Glucose 76 70 - 125 mg/dL    BUN 10 8 - 22 mg/dL    Creatinine 0.85 0.60 - 1.10 mg/dL    GFR MDRD Af Amer >60 >60 mL/min/1.73m2    GFR MDRD Non Af Amer >60 >60 mL/min/1.73m2    Bilirubin, Total 0.7 0.0 - 1.0 mg/dL    Calcium 9.3 8.5 - 10.5 mg/dL    Protein, Total 6.7 6.0 - 8.0 g/dL    Albumin 3.7 3.5 - 5.0 g/dL    Alkaline Phosphatase 111 45 - 120 U/L    AST 15 0 - 40 U/L    ALT 13 0 - 45 U/L    Narrative    Fasting Glucose reference range is 70-99 mg/dL per  American Diabetes Association (ADA) guidelines.   Urinalysis-UC if Indicated   Result Value Ref Range    Color, UA Yellow Colorless, Yellow, Straw, Light Yellow    Clarity, UA Clear Clear    Glucose, UA Negative Negative    Bilirubin, UA Negative Negative    Ketones, UA Negative Negative    Specific Gravity, UA 1.014 1.001 - 1.030    Blood, UA Negative Negative    pH, UA 5.5 4.5 - 8.0    Protein, UA Negative Negative mg/dL    Urobilinogen, UA <2.0 E.U./dL <2.0 E.U./dL, 2.0 E.U./dL    Nitrite, UA Negative Negative    Leukocytes, UA Negative Negative    Narrative    Microscopic not indicated  UC not indicated   HM2(CBC w/o Differential)   Result Value Ref Range    WBC 5.4 4.0 - 11.0 thou/uL    RBC 4.37 3.80 - 5.40  mill/uL    Hemoglobin 13.3 12.0 - 16.0 g/dL    Hematocrit 38.1 35.0 - 47.0 %    MCV 87 80 - 100 fL    MCH 30.4 27.0 - 34.0 pg    MCHC 34.8 32.0 - 36.0 g/dL    RDW 12.0 11.0 - 14.5 %    Platelets 357 140 - 440 thou/uL    MPV 8.2 7.0 - 10.0 fL   Vitamin B12   Result Value Ref Range    Vitamin B-12 537 213 - 816 pg/mL   Vitamin D, Total (25-Hydroxy)   Result Value Ref Range    Vitamin D, Total (25-Hydroxy) 32.5 30.0 - 80.0 ng/mL    Narrative    Deficiency <10.0 ng/mL  Insufficiency 10.0-29.9 ng/mL  Sufficiency 30.0-80.0 ng/mL  Toxicity (possible) >100.0 ng/mL   Thyroid Cascade   Result Value Ref Range    TSH 1.33 0.30 - 5.00 uIU/mL       Destinee, your cholesterol looks about the same as it has been.  Keep up with the diet and exercise.  Everything else here looks perfect.     Please call with questions or contact us using [employer].    Sincerely,        Electronically signed by Matt Sung MD

## 2021-06-21 NOTE — LETTER
Letter by Matt Sung MD at      Author: Matt Sung MD Service: -- Author Type: --    Filed:  Encounter Date: 4/28/2021 Status: (Other)         Hill Odom  [address] [place] [address]             April 28, 2021         Dear Ms. Odom,    Below are the results from your recent visit:    Resulted Orders   DXA Bone Density Scan    Narrative    4/22/2021      RE: iHll Odom  YOB: 1965        Dear Matt Sung,    Patient Profile:  55 y.o. female, postmenopausal, is here for the follow up bone density   test.   History of fractures - None. Family history of osteoporosis - Yes;    mother.  Family history of hip fracture: None. Smoking history - No.   Osteoporosis treatment past -  No. Osteoporosis treatment current - No.    Chronic medical problems - Hysterectomy. High risk medications -    Steroids;  Yes, in the Past.      Assessment:    1. The spine bone density L1-L4 with T-score is  -1.6.  2. Femoral bone densities show left femoral neck T- score -1.2 and right   femoral neck T-score -1.4.  3. Trabecular bone score indicates good trabecular bone architecture.      55 y.o. female with LOW BONE DENSITY (OSTEOPENIA) and LOW fracture risk,   adjusted for the TBS, with major osteoporotic fracture risk 6.5% and hip   fracture risk 0.4%.     Since the previous bone density dated March 26, 2018, there has been a   -5.9% change in the bone density of the spine.  Additionally there has   been no statistically significant % change in the left total hip and a   -4.5% change in the right total hip.    Recommendations:  Appropriate calcium, vitamin D supplements, along with balance and weight   bearing exercise recommended with follow up bone density scan in 3 years.      Bone densitometry was performed on your patient using our [employer]   densitometer. The results are summarized and a copy of the actual scans   are included for your review. In conformity with the International Society    of Clinical Densitometry's most recent position statement for DXA   interpretation (2015), the diagnosis will be made on the lowest measured   T-score of the lumbar spine, femoral neck, total proximal femur or 33%   radius. Note the change in terminology for diagnostic classification from   OSTEOPENIA to LOW BONE MASS. All trending for sequential exams will be   done using multiple vertebrae or the total proximal femur. Fracture risk   is based on the WHO Fracture Risk Assessment Tool (FRAX). If additional   information is needed or if you would like to discuss the results, please   do not hesitate to call me.       Thank you for referring this patient to [facility]. We are happy to be of service in support of you and your   practice. If you have any questions or suggestions to improve our service,   please call me at [phone].     Sincerely,     Florinda Ramirez M.D. CShaniaCMARLY.  [facility]        Bone density has declined, however they still look pretty good and your fracture risk is low.  Stay active, get recommended calcium and vitamin D, and we should recheck again in 3 years     Please call with questions or contact us using [email or web address].    Sincerely,        Electronically signed by Matt Sung MD

## 2021-06-21 NOTE — PROGRESS NOTES
ASSESSMENT AND PLAN:  Hill Odom 53 y.o. female is seen here on 11/20/18 for evaluation of polyarthralgias.  She has several signals to suggest that she has beginning of osteoarthritis such as of the knees.  However that does not explain what she describes in terms of sudden onset of overnight development of pain.  She had several signals which makes one wonder if she experienced a bout of inflammatory arthropathy, the evidence for now is lacking.  We discussed various categories of inflammatory arthropathies which can behave like this.  At this point the residual symptoms are more likely due to osteoarthritis of the knees.  She has bursitis such as in the trochanteric region.  Management principles are outlined.  X-rays of the knees to be taken today.  [employer] of rheumat[employer] handout on OA provided.  Should she develop the original symptoms again she will return for follow-up sooner otherwise we will meet here in the next 3-4 months.  Diagnoses and all orders for this visit:    Polyarthralgia  -     XR Knees Bilateral 1 Or 2 VWS; Future; Expected date: 11/20/2018  -     XR Knees Bilateral 1 Or 2 VWS    Trochanteric bursitis of both hips  -     XR Knees Bilateral 1 Or 2 VWS; Future; Expected date: 11/20/2018  -     XR Knees Bilateral 1 Or 2 VWS    Primary osteoarthritis of both knees  -     XR Knees Bilateral 1 Or 2 VWS; Future; Expected date: 11/20/2018  -     XR Knees Bilateral 1 Or 2 VWS      HISTORY OF PRESENTING ILLNESS:  Hill Odom, 53 y.o., female is here for evaluation of painful joints.  She reports that this seems to have come on out of the blue.  It is not last week of September when she was in fact woken up as she recalls with pain in her elbows and knees soon thereafter she had discomfort in her hands.  She is not able to recall clearly if there was swelling or not she thinks probably not.  The pain was quite severe.  She had seen a tick crawling on her body [facility] [place]  After she had experienced this pain for a few days she went to see the walk-in clinic.  She had extensive workup.  There is no evidence of autoimmunity.  Lyme serology was negative.  Over time, across 7-8 weeks now, she feels that this may be 50 to more than 50% improvement.  She commented that if the pain that was 10/10 now it feels 3/10.  Over the past week or so it seems to have plateaued.  She reports no rash, no history of ulcerative colitis Crohn's disease herself or the family or psoriasis.  She has noted residual discomfort in the elbows on the medial aspect, she still has moderately severe discomfort in her knees.  The knee pain typically troubles her more when she is up and about.  Walking makes it worse.  Going up and down the steps make its worse.  Overall she rated her pain at 5.5/10.  She has pain but no stiffness in the morning.  She has not had fever or weight loss blurry vision eye redness uveitis nausea cough.  She works in an office, does not smoke.      Further historical information and ADL limitations as noted in the multidimensional health assessment questionnaire attached in the EMR. Rest of the 13 system ROS is negative.     ALLERGIES:Augmentin [amoxicillin-pot clavulanate]; Doxycycline; Simvastatin; Amoxicillin; Betamethasone; Ciprofloxacin; and Iron    PAST MEDICAL/ACTIVE PROBLEMS/MEDICATION/ FAMILY HISTORY/SOCIAL DATA:  The patient has a family history of  Past Medical History:   Diagnosis Date     Cholelithiasis 6/15/2015     History of anesthesia complications     \"Slow to wake following geberal anesthesia.\"     Hyperlipidemia     Created by Conversion      Hyperlipidemia      Migraine headache      PONV (postoperative nausea and vomiting)      Rosacea     Created by Conversion      Social History     Tobacco Use   Smoking Status Never Smoker   Smokeless Tobacco Never Used     Patient Active Problem List   Diagnosis     Hyperlipidemia     Rosacea     Migraine Headache " "    Dermatographia     Cholelithiasis     Neuropathic pain     Sensory disturbance     Current Outpatient Medications   Medication Sig Dispense Refill     cholecalciferol, vitamin D3, (VITAMIN D3) 4,000 unit cap Take 1 capsule by mouth daily. [facility] BRAND only       cyanocobalamin (VITAMIN B-12) 50 mcg tablet Take 50 mcg by mouth daily.       multivitamin therapeutic tablet Take 1 tablet by mouth daily.       SUMAtriptan (IMITREX) 100 MG tablet TAKE 1 TABLET (100 MG TOTAL) BY MOUTH EVERY 2 (TWO) HOURS AS NEEDED FOR MIGRAINE. 27 tablet 3     No current facility-administered medications for this visit.        COMPREHENSIVE EXAMINATION:  Vitals:    11/20/18 0810   BP: 110/80   Patient Site: Right Arm   Patient Position: Sitting   Cuff Size: Adult Regular   Pulse: 72   Weight: 167 lb (75.8 kg)   Height: 5' 4\" (1.626 m)     A well appearing alert oriented female. Vital data as noted above. Her eyes without inflammation/scleromalacia. ENTwithout oral mucositis, thrush, nasal deformity, external ear redness, deformity. Her neck is without lymphadenopathy and supple. Lungs normal sounds, no pleural rub. Heart auscultation normal rate, rhythm; no pericardial rub and murmurs. Abdomen soft, non tender, no organomegaly. Skin examined for heliotrope, malar area eruption, lupus pernio, periungual erythema, sclerodactyly, papules, erythema nodosum, purpura, nail pitting, onycholysis, and obvious psoriasis lesion. Neurological examination shows normal alertness, speech, facial symmetry, tone and power in upper and lower extremities, Tinel's and Phalen's at wrist and gait. The joint examination is performed for swelling, tenderness, warmth, erythema, and range of motion in the following joints: DIPs, PIPs, MCPs, wrists, first CMC's, elbows, shoulders, hips, knees, ankles, feet; spine for range of motion and paraspinal muscles for tenderness. The salient normal / abnormal findings are appended.  She has tenderness in the knee joints " bilaterally and the line medially more so on the right side, full range of motion however without effusion or warmth.  She has tenderness of the trochanteric area.  She does not have synovitis in any of the palpable joints of upper and lower extremities.  Her range of motion of the shoulders and hip is normal.  She has minimal tenderness across the trapezius.  She does not have sclerodactyly, periungual erythema, malar area eruption.  She is able to fully extend her elbows bilaterally.  Adipose tissue deposition on the medial aspect of both her medial epicondyles where she is tender.    LAB / IMAGING DATA:  ALT   Date Value Ref Range Status   04/13/2018 20 0 - 45 U/L Final   02/26/2018 14 0 - 45 U/L Final   12/26/2017 17 0 - 45 U/L Final     Albumin   Date Value Ref Range Status   04/13/2018 3.7 3.5 - 5.0 g/dL Final   02/26/2018 3.6 3.5 - 5.0 g/dL Final   12/26/2017 3.4 (L) 3.5 - 5.0 g/dL Final     Creatinine   Date Value Ref Range Status   04/13/2018 0.84 0.60 - 1.10 mg/dL Final   02/26/2018 0.74 0.60 - 1.10 mg/dL Final   12/26/2017 0.78 0.60 - 1.10 mg/dL Final       WBC   Date Value Ref Range Status   10/21/2018 6.0 4.0 - 11.0 thou/uL Final   04/13/2018 6.6 4.0 - 11.0 thou/uL Final   06/22/2015 6.4 4.0 - 11.0 thou/uL Final   06/15/2015 8.2 4.0 - 11.0 thou/uL Final     Hemoglobin   Date Value Ref Range Status   10/21/2018 13.3 12.0 - 16.0 g/dL Final   04/13/2018 12.7 12.0 - 16.0 g/dL Final   02/26/2018 12.8 12.0 - 16.0 g/dL Final     Platelets   Date Value Ref Range Status   10/21/2018 353 140 - 440 thou/uL Final   04/13/2018 316 140 - 440 thou/uL Final   02/26/2018 325 140 - 440 thou/uL Final       Lab Results   Component Value Date    RF <15.0 10/21/2018    SEDRATE 11 10/21/2018

## 2021-06-21 NOTE — PROGRESS NOTES
Subjective findings: The patient returns to the clinic today complaining of painful calluses on the bottom of both feet.        Objective findings: Nails bilateral feet normally and color.  Skin bilateral feet warm and intact.  There are painful hyperkeratotic nucleated lesions sub-fourth metatarsal head both feet.  There is also a hyperkeratotic lesion on the plantar aspect of the right great toe.  DP and PT pulses +2 over 4 bilateral feet.  Capillary refill less than 2 seconds bilateral feet.  Negative clonus, negative Babinski bilateral feet.  Range of motion within normal limits bilateral feet.  Muscle power +5 over 5 bilaterally in all compartments.      Assessment: Intractable plantar keratoma both feet      Plan: Debrided hyperkeratotic lesions both feet.

## 2021-06-21 NOTE — LETTER
Letter by Matt Sung MD at      Author: Matt Sung MD Service: -- Author Type: --    Filed:  Encounter Date: 3/30/2021 Status: (Other)         Hill Odom  [address] [place] [address]             March 30, 2021         Dear Ms. Odom,    Below are the results from your recent visit:    Resulted Orders   HM2(CBC w/o Differential)   Result Value Ref Range    WBC 6.7 4.0 - 11.0 thou/uL    RBC 4.62 3.80 - 5.40 mill/uL    Hemoglobin 13.3 12.0 - 16.0 g/dL    Hematocrit 40.7 35.0 - 47.0 %    MCV 88 80 - 100 fL    MCH 28.8 27.0 - 34.0 pg    MCHC 32.7 32.0 - 36.0 g/dL    RDW 12.8 11.0 - 14.5 %    Platelets 338 140 - 440 thou/uL    MPV 9.5 7.0 - 10.0 fL   Lipid Cascade FASTING   Result Value Ref Range    Cholesterol 241 (H) <=199 mg/dL    Triglycerides 91 <=149 mg/dL    HDL Cholesterol 53 >=50 mg/dL    LDL Calculated 170 (H) <=129 mg/dL    Patient Fasting > 8hrs? Yes    Comprehensive Metabolic Panel   Result Value Ref Range    Sodium 139 136 - 145 mmol/L    Potassium 4.4 3.5 - 5.0 mmol/L    Chloride 104 98 - 107 mmol/L    CO2 24 22 - 31 mmol/L    Anion Gap, Calculation 11 5 - 18 mmol/L    Glucose 73 70 - 125 mg/dL    BUN 13 8 - 22 mg/dL    Creatinine 0.80 0.60 - 1.10 mg/dL    GFR MDRD Af Amer >60 >60 mL/min/1.73m2    GFR MDRD Non Af Amer >60 >60 mL/min/1.73m2    Bilirubin, Total 0.7 0.0 - 1.0 mg/dL    Calcium 9.1 8.5 - 10.5 mg/dL    Protein, Total 6.7 6.0 - 8.0 g/dL    Albumin 4.0 3.5 - 5.0 g/dL    Alkaline Phosphatase 117 45 - 120 U/L    AST 16 0 - 40 U/L    ALT 17 0 - 45 U/L    Narrative    Fasting Glucose reference range is 70-99 mg/dL per  American Diabetes Association (ADA) guidelines.   Thyroid Cascade   Result Value Ref Range    TSH 1.23 0.30 - 5.00 uIU/mL   Urinalysis-UC if Indicated   Result Value Ref Range    Color, UA Yellow Colorless, Yellow, Straw, Light Yellow    Clarity, UA Clear Clear    Glucose, UA Negative Negative    Protein, UA Negative Negative    Bilirubin, UA Negative Negative     Urobilinogen, UA 0.2 E.U./dL 0.2 E.U./dL, 1.0 E.U./dL    pH, UA 6.0 5.0 - 8.0    Blood, UA Trace (!) Negative    Ketones, UA Negative Negative    Nitrite, UA Negative Negative    Leukocytes, UA Negative Negative    Specific Gravity, UA 1.025 1.005 - 1.030    RBC, UA 0-2 None Seen, 0-2 hpf    WBC, UA 0-5 None Seen, 0-5 hpf    Bacteria, UA Few (!) None Seen    Squam Epithel, UA 0-5 None Seen, 0-5 lpf    Narrative    UC not indicated   Hepatitis C Antibody (Anti-HCV)   Result Value Ref Range    Hepatitis C Ab Negative Negative       Destinee, your cholesterol is stable.  Everything else here looks good.     Please call with questions or contact us using [employer] networkt.    Sincerely,        Electronically signed by Matt Sung MD

## 2021-06-24 NOTE — PROGRESS NOTES
Office Visit - Physical   Hill Odom   53 y.o.  female    Date of visit: 2/28/2019  Physician: Matt Sung MD     Assessment and Plan   1. Routine general medical examination at a health care facility  Patient was a healthy lifestyle.  I urged more regular exercise.  She will get her shingles vaccine in the next couple years.  She just recently had shingles.  Otherwise she is up-to-date on her health maintenance.  She will be due for bone density next year I believe.  - Ambulatory referral to Dermatology  - Comprehensive Metabolic Panel  - HM2(CBC w/o Differential)  - Urinalysis-UC if Indicated  - Lipid Cascade  - Vitamin B12  - Vitamin D, Total (25-Hydroxy)  - Urinalysis Macroscopic    2. Vitamin B12 deficiency (non anemic)    - Vitamin B12    3. Rosacea  Continue with current regimen.    4. Other migraine without status migrainosus, not intractable  She has had a couple migraines and this is fairly rare for her after menopause.  I suspect it is due to her recent stressors.    5. Hyperlipidemia, unspecified hyperlipidemia type  Lipids today.  We discussed dietary measures.    6. Inflamed skin tag    - Ambulatory referral to Dermatology    7. Low bone mass  Bone density next year.  - Vitamin D, Total (25-Hydroxy)    The following high BMI interventions were performed this visit: prescribed dietary intake and weight loss from baseline weight    Return in about 6 months (around 8/28/2019) for Recheck.     Chief Complaint   Chief Complaint   Patient presents with     Annual Exam     fasting today     Skin Problem     look at skin tag under Lt armpit         Patient Profile   Social History     Social History Narrative     Not on file        Past Medical History   Patient Active Problem List   Diagnosis     Hyperlipidemia     Rosacea     Migraine Headache     Dermatographia     Cholelithiasis     Neuropathic pain     Sensory disturbance     Polyarthralgia     Trochanteric bursitis of both hips     Primary  osteoarthritis of both knees     Benign neoplasm of colon     CIDP (chronic inflammatory demyelinating polyneuropathy) (H)     Epigastric pain     Hemorrhoids     Neoplasm of uncertain behavior of stomach, intestines, and rectum     Numbness     Other chronic pain     Small fiber neuropathy (H)     Special screening for malignant neoplasms, colon     Vitamin B12 deficiency (non anemic)     H/O: hysterectomy     Low bone mass       Past Surgical History  She has a past surgical history that includes pr supracerv abd hysterectomy; Hysterectomy; Breast biopsy (Left); Foot surgery; Blepharoplasty (Bilateral, 12/21/2015); and Hysterectomy.     History of Present Illness   This 53 y.o. old Destinee comes in today for physical.  She reports she has been doing fairly well.  Her arthralgias have resolved.  She notes that those occurred when she started drinking diet pop and attributes her arthralgias to the diet pop.  She is now back to drinking sugared pop.  She is cut back a lot of the sugar intake though and she is lost some weight.  She is pleased about that.  She denies other somatic concerns except for a irritated skin tag in her left axilla.  She did like to have it removed because it inhibits her shaving etc.    She got a new job.  She is a  for the [employer] board.  She also got a new [employer].    Review of Systems: A comprehensive review of systems was negative except as noted.     Medications and Allergies   Current Outpatient Medications   Medication Sig Dispense Refill     cholecalciferol, vitamin D3, (VITAMIN D3) 4,000 unit cap Take 1 capsule by mouth daily. [facility] BRAND only       cyanocobalamin (VITAMIN B-12) 50 mcg tablet Take 50 mcg by mouth daily.       multivitamin therapeutic tablet Take 1 tablet by mouth daily.       SUMAtriptan (IMITREX) 100 MG tablet TAKE 1 TABLET (100 MG TOTAL) BY MOUTH EVERY 2 (TWO) HOURS AS NEEDED FOR MIGRAINE. 27 tablet 3     No current facility-administered medications for this  "visit.      Allergies   Allergen Reactions     Augmentin [Amoxicillin-Pot Clavulanate]      Need to avoid amoxicillin, had non-specific drug rash to amox---okay for penicillin, see allergy note 1/4/16     Doxycycline      Itchy/rash on face, swollen in hands and feet     Simvastatin Rash     Amoxicillin Rash     Okay for penicillin---see allergy note from 1/4/16  Okay for penicillin---see allergy note from 1/4/16     Betamethasone Rash     Ciprofloxacin Rash     Iron Rash        Family and Social History   Family History   Problem Relation Age of Onset     Breast cancer Cousin      Hyperlipidemia Mother      Hyperlipidemia Maternal Uncle         Social History     Tobacco Use     Smoking status: Never Smoker     Smokeless tobacco: Never Used   Substance Use Topics     Alcohol use: No     Drug use: No        Physical Exam   General Appearance:   Nice middle-aged woman in no distress.    /70 (Patient Site: Right Arm, Patient Position: Sitting, Cuff Size: Adult Regular)   Pulse 82   Ht 5' 4\" (1.626 m)   Wt 162 lb (73.5 kg)   LMP 07/29/2012   SpO2 98%   BMI 27.81 kg/m      EYES: Eyelids, conjunctiva, and sclera were normal. Pupils were normal. Cornea, iris, and lens were normal bilaterally.  HEAD, EARS, NOSE, MOUTH, AND THROAT: Head and face were normal. Hearing was normal to voice and the ears were normal to external exam. Nose appearance was normal and there was no discharge. Oropharynx was normal.  NECK: Neck appearance was normal. There were no neck masses and the thyroid was not enlarged.  RESPIRATORY: Breathing pattern was normal and the chest moved symmetrically.  Percussion/auscultatory percussion was normal.  Lung sounds were normal and there were no abnormal sounds.  CARDIOVASCULAR: Heart rate and rhythm were normal.  S1 and S2 were normal and there were no extra sounds or murmurs. Peripheral pulses in arms and legs were normal.  Jugular venous pressure was normal.  There was no peripheral " edema.  GASTROINTESTINAL: The abdomen was normal in contour.  Bowel sounds were present.  Percussion detected no organ enlargement or tenderness.  Palpation detected no tenderness, mass, or enlarged organs.   MUSCULOSKELETAL: Skeletal configuration was normal and muscle mass was normal for age. Joint appearance was overall normal.  LYMPHATIC: There were no enlarged nodes.  SKIN/HAIR/NAILS: Skin color was normal.  There were no skin lesions.  Hair and nails were normal.  NEUROLOGIC: The patient was alert and oriented to person, place, time, and circumstance. Speech was normal. Cranial nerves were normal. Motor strength was normal for age. The patient was normally coordinated.  PSYCHIATRIC:  Mood and affect were normal and the patient had normal recent and remote memory. The patient's judgment and insight were normal.    ADDITIONAL VITAL SIGNS: None  CHEST WALL/BREASTS: Normal breast and axilla bilaterally.  She does have a fairly big skin tag in the left axilla as well as seborrheic keratosis.  RECTAL: Deferred  GENITAL/URINARY: Deferred     Additional Information        Matt Sung MD  Internal Medicine  Contact me at [phone]

## 2021-06-24 NOTE — PROGRESS NOTES
ASSESSMENT AND PLAN:  Hill Odom 53 y.o. female is seen here on 03/12/19 for follow-up of arthralgias, in the absence of connective tissue disease or inflammatory joint condition.  She has trochanteric bursitis.  She has early changes of osteoarthritis more clinically then radiologically of the knees.  We reviewed the management principles.  She will return for follow-up on as-needed basis.    Diagnoses and all orders for this visit:    Trochanteric bursitis of both hips      HISTORY OF PRESENTING ILLNESS:  Hill Odom, 53 y.o., female is here for follow-up of of painful joints including her elbow areas more medially, trochanteric regions.  she has noted overall improvement in her pain especially the knees.  Residual discomfort in her hip and the elbow region.  Noted pain level to be 2.0/10.  She is able to do most of her day-to-day activities.  She regularly works out an [employer].  She has not had fever weight loss blurry vision eye redness mouth ulcer nausea cough or rash.  She reports that this seems to have come on out of the blue.  It is not last week of September when she was in fact woken up as she recalls with pain in her elbows and knees soon thereafter she had discomfort in her hands.  She is not able to recall clearly if there was swelling or not she thinks probably not.  The pain was quite severe.  She had seen a tick crawling on her body [place]  After she had experienced this pain for a few days she went to see the walk-in clinic.  She had extensive workup.  There is no evidence of autoimmunity.  Lyme serology was negative.  Over time, across 7-8 weeks now, she feels that this may be 50 to more than 50% improvement.  She commented that if the pain that was 10/10 now it feels 3/10.  Over the past week or so it seems to have plateaued.  She reports no rash, no history of ulcerative colitis Crohn's disease herself or the family or psoriasis.  She has noted  "residual discomfort in the elbows on the medial aspect, she still has moderately severe discomfort in her knees.  The knee pain typically troubles her more when she is up and about.  Walking makes it worse.  Going up and down the steps make its worse.  Overall she rated her pain at 5.5/10.  She has pain but no stiffness in the morning.  She has not had fever or weight loss blurry vision eye redness uveitis nausea cough.  She works in an office, does not smoke.      Further historical information and ADL limitations as noted in the multidimensional health assessment questionnaire attached in the EMR.     ALLERGIES:Augmentin [amoxicillin-pot clavulanate]; Doxycycline; Simvastatin; Amoxicillin; Betamethasone; Ciprofloxacin; and Iron    PAST MEDICAL/ACTIVE PROBLEMS/MEDICATION/ FAMILY HISTORY/SOCIAL DATA:  The patient has a family history of  Past Medical History:   Diagnosis Date     Cholelithiasis 6/15/2015     History of anesthesia complications     \"Slow to wake following geberal anesthesia.\"     Hyperlipidemia     Created by Conversion      Hyperlipidemia      Migraine headache      PONV (postoperative nausea and vomiting)      Rosacea     Created by Conversion      Social History     Tobacco Use   Smoking Status Never Smoker   Smokeless Tobacco Never Used     Patient Active Problem List   Diagnosis     Hyperlipidemia     Rosacea     Migraine Headache     Dermatographia     Cholelithiasis     Neuropathic pain     Sensory disturbance     Polyarthralgia     Trochanteric bursitis of both hips     Primary osteoarthritis of both knees     Benign neoplasm of colon     CIDP (chronic inflammatory demyelinating polyneuropathy) (H)     Epigastric pain     Hemorrhoids     Neoplasm of uncertain behavior of stomach, intestines, and rectum     Numbness     Other chronic pain     Small fiber neuropathy (H)     Special screening for malignant neoplasms, colon     Vitamin B12 deficiency (non anemic)     H/O: hysterectomy     Low " bone mass     Current Outpatient Medications   Medication Sig Dispense Refill     cholecalciferol, vitamin D3, (VITAMIN D3) 4,000 unit cap Take 1 capsule by mouth daily. [employer]AND only       cyanocobalamin (VITAMIN B-12) 50 mcg tablet Take 50 mcg by mouth daily.       multivitamin therapeutic tablet Take 1 tablet by mouth daily.       SUMAtriptan (IMITREX) 100 MG tablet TAKE 1 TABLET (100 MG TOTAL) BY MOUTH EVERY 2 (TWO) HOURS AS NEEDED FOR MIGRAINE. 27 tablet 3     No current facility-administered medications for this visit.      DETAILED EXAMINATION  03/12/19  :  Vitals:    03/12/19 0820   BP: 100/86   Patient Site: Right Arm   Patient Position: Sitting   Cuff Size: Adult Regular   Pulse: 76   Weight: 162 lb (73.5 kg)     Alert oriented. Head including the face is examined for malar rash, heliotropes, scarring, lupus pernio. Eyes examined for redness such as in episcleritis/scleritis, periorbital lesions.   Neck/ Face examined for parotid gland swelling, range of motion of neck.  Left upper and lower and right upper and lower extremities examined for tenderness, swelling, warmth of the appendicular joints, range of motion, edema, rash.  Some of the important findings included: She has tenderness in the trochanteric areas bilaterally, medial epicondyles, JLT of the knees.    LAB / IMAGING DATA:  ALT   Date Value Ref Range Status   02/28/2019 15 0 - 45 U/L Final   04/13/2018 20 0 - 45 U/L Final   02/26/2018 14 0 - 45 U/L Final     Albumin   Date Value Ref Range Status   02/28/2019 3.7 3.5 - 5.0 g/dL Final   04/13/2018 3.7 3.5 - 5.0 g/dL Final   02/26/2018 3.6 3.5 - 5.0 g/dL Final     Creatinine   Date Value Ref Range Status   02/28/2019 0.81 0.60 - 1.10 mg/dL Final   04/13/2018 0.84 0.60 - 1.10 mg/dL Final   02/26/2018 0.74 0.60 - 1.10 mg/dL Final       WBC   Date Value Ref Range Status   02/28/2019 5.8 4.0 - 11.0 thou/uL Final   10/21/2018 6.0 4.0 - 11.0 thou/uL Final   06/22/2015 6.4 4.0 - 11.0 thou/uL Final    06/15/2015 8.2 4.0 - 11.0 thou/uL Final     Hemoglobin   Date Value Ref Range Status   02/28/2019 13.1 12.0 - 16.0 g/dL Final   10/21/2018 13.3 12.0 - 16.0 g/dL Final   04/13/2018 12.7 12.0 - 16.0 g/dL Final     Platelets   Date Value Ref Range Status   02/28/2019 319 140 - 440 thou/uL Final   10/21/2018 353 140 - 440 thou/uL Final   04/13/2018 316 140 - 440 thou/uL Final       Lab Results   Component Value Date    RF <15.0 10/21/2018    SEDRATE 11 10/21/2018

## 2021-06-25 NOTE — PROGRESS NOTES
Progress Notes by Eric Steele MD at 10/7/2017  8:00 AM     Author: Eric Steele MD Service: -- Author Type: Physician    Filed: 10/7/2017  8:37 AM Encounter Date: 10/7/2017 Status: Signed    : Eric Steele MD (Physician)       Assessment/Plan:    Hill was seen today for rash.    Diagnoses and all orders for this visit:    Herpes zoster without complication: This patient has a rash that appears to be shingles although it is in an unusual distribution.  Interestingly, she also has worsening of her neuropathic symptoms from a previous back injury without obvious precipitant.  The patient I discussed the differential for this type of rash including atopic dermatitis.  Given that the appearance of the rash is fairly typical for shingles, I am recommending antivirals.  She is within 72 hours of new lesions and does believe that she is developing new lesions which prompted her visit today.  She understands that if she has worsening of symptoms or no improvement at all she should consider reevaluation later the week given the atypical distribution of shingles.    Other orders  -     valACYclovir (VALTREX) 1000 MG tablet; Take 1 tablet (1,000 mg total) by mouth 3 (three) times a day for 7 days.     Return in about 4 days (around 10/11/2017), or if symptoms worsen or fail to improve.    Eric Steele MD  _______________________________    Chief Complaint   Patient presents with   ? Rash     x 3 days. Very itcy. Poss shingles. Left foot     Subjective: Hill Odom is a 52 y.o. year old female who I have not seen in clinic before who presents with the following acute complaint(s):    Rash on foot:   -This patient is a 52-year-old woman with a history of radicular low back pain who presents with 1 week of worsening burning/pain throughout her leg in 3 days of a rash on her arch.  The rash from her perspective appears to be ascending.  She describes the discomfort as more burning/painful and  itchy.  She did add some Benadryl and topical preparation to the area several days ago without relief.  She otherwise feels well.  She is unsure why her back is worse.  She is taking some ibuprofen which she finds helps with pain overall.  No weakness.  No urinary incontinence.  No trauma.  No new lotions, detergents, clothing.    ROS: Complete review of systems obtained.  Pertinent items are listed above.     The following portions of the patient's history were reviewed and updated as appropriate: allergies, current medications, past medical history and problem list.     Objective:   /72  Pulse 78  Temp 98.2  F (36.8  C) (Oral)   Resp 12  Wt 169 lb 8 oz (76.9 kg)  LMP 07/29/2012  SpO2 100%  Breastfeeding? No  BMI 29.09 kg/m2  General: No acute distress, pleasant.  Skin: On the arch of her left foot there are approximately dozen mostly confluent bumps with a red base.  There are mildly tender to palpation.  No fluctuance.  No surrounding erythema.  There are several other lesions that appear to be evolving proximal on her medial malleoli.          No results found for this or any previous visit (from the past 24 hour(s)).  No results found.    Additional History from Old Records Summarized (2): no  Decision to Obtain Records (1): no  Radiology Tests Summarized or Ordered (1): no  Labs Reviewed or Ordered (1): no  Medicine Test Summarized or Ordered (1): no  Independent Review of EKG or X-RAY(2 each): no    This note has been dictated using voice recognition software. Any grammatical or context distortions are unintentional and inherent to the software

## 2021-06-26 NOTE — PROGRESS NOTES
"Progress Notes by Danny Dubose PA-C at 10/21/2018 10:40 AM     Author: Danny Dubose PA-C Service: -- Author Type: Physician Assistant    Filed: 10/21/2018  2:24 PM Encounter Date: 10/21/2018 Status: Signed    : Danny Dubose PA-C (Physician Assistant)       Subjective:      Patient ID: Hill Odom is a 53 y.o. female.    Chief Complaint:    HPI  Hill Odom is a 53 y.o. female who presents today complaining of a 3-month progressively worsening history of arthralgias.  Patient states that she has been having difficulty with her bilateral elbows wrists and hands as well as knees and ankles.  She does not report any large monoarthropathy or effusion.  She does state that the pain in the joints is worsening and that is now starting to keep her awake at nighttime.  She denies any other stage I Lyme disease symptoms and has not noticed tick bite or skin rash.  However, she has had exposure to Lyme disease with her [place] in [place].  She also further recounts removing a tick from her but said it did not bite her.  Three months ago at the same time she removed the tick from herself she states her mother has been diagnosed with Lyme disease.  There is no family history of rheumatoid arthritis.  She does not give a history of prior joint traumas, infections, STIs since she is not sexually active, no family history of rheumatoid arthritis and no history of gelling in the morning.  She has not tried treatment for this at home.    Past Medical History:   Diagnosis Date   ? Cholelithiasis 6/15/2015   ? History of anesthesia complications     \"Slow to wake following geberal anesthesia.\"   ? Hyperlipidemia     Created by Conversion    ? Hyperlipidemia    ? Migraine headache    ? PONV (postoperative nausea and vomiting)    ? Rosacea     Created by Conversion        Past Surgical History:   Procedure Laterality Date   ? BLEPHAROPLASTY Bilateral 12/21/2015    Procedure: BLEPHAROPLASTY BILATERAL UPPER LIDS;  " Surgeon: Sweta Ring MD;  Location: [facility];  Service:    ? BREAST BIOPSY Left     benign   ? FOOT SURGERY     ? HYSTERECTOMY     ? HYSTERECTOMY     ? [place] SUPRACERV ABD HYSTERECTOMY      Description: Supracervical Hysterectomy;  Recorded: 07/03/2012;       Family History   Problem Relation Age of Onset   ? Breast cancer Cousin    ? Hyperlipidemia Mother    ? Hyperlipidemia Maternal Uncle        Social History   Substance Use Topics   ? Smoking status: Never Smoker   ? Smokeless tobacco: Never Used   ? Alcohol use No       Review of Systems  As above in HPI, otherwise negative.  She denies having a history of sore throat or odynophagia or known history of strep throat no noted skin rash.    Objective:     /86 (Patient Site: Right Arm, Patient Position: Sitting, Cuff Size: Adult Regular)  Pulse 70  Temp 98.4  F (36.9  C) (Oral)   Resp 16  Wt 167 lb 3 oz (75.8 kg)  LMP 07/29/2012  SpO2 98%  BMI 28.7 kg/m2    Physical Exam  General: Patient is resting comfortably no acute distress is afebrile  HEENT: Head is normocephalic atraumatic   eyes are PERRL EOMI sclera anicteric   No cervical lymphadenopathy present  LUNGS: Clear to auscultation bilaterally  HEART: Regular rate and rhythm  Skin: Without rash non-diaphoretic  Musculoskeletal: Patient has pain in the multiple joints of the bilateral hands wrist and elbow but there is no noted effusion of either of those joints.  Her shoulders are with full range of motion hips knees and ankles are without effusion she does have pain with range of motion in the knee and ankle bilaterally.    Lab:  Recent Results (from the past 24 hour(s))   HM1 (CBC with Diff)   Result Value Ref Range    WBC 6.0 4.0 - 11.0 thou/uL    RBC 4.59 3.80 - 5.40 mill/uL    Hemoglobin 13.3 12.0 - 16.0 g/dL    Hematocrit 38.8 35.0 - 47.0 %    MCV 85 80 - 100 fL    MCH 28.9 27.0 - 34.0 pg    MCHC 34.2 32.0 - 36.0 g/dL    RDW 11.1 11.0 - 14.5 %    Platelets 353 140 - 440 thou/uL    MPV  7.8 7.0 - 10.0 fL    Neutrophils % 62 50 - 70 %    Lymphocytes % 28 20 - 40 %    Monocytes % 6 2 - 10 %    Eosinophils % 4 0 - 6 %    Basophils % 0 0 - 2 %    Neutrophils Absolute 3.7 2.0 - 7.7 thou/uL    Lymphocytes Absolute 1.7 0.8 - 4.4 thou/uL    Monocytes Absolute 0.4 0.0 - 0.9 thou/uL    Eosinophils Absolute 0.2 0.0 - 0.4 thou/uL    Basophils Absolute 0.0 0.0 - 0.2 thou/uL     ESR, CRP, Lyme screening cascade, PATRICIA, rheumatoid factor, CCP are pending at time of documentation.    Assessment:     Procedures    The encounter diagnosis was Arthralgia, unspecified joint.    Plan:       1. Arthralgia, unspecified joint  HM1(CBC and Differential)    Erythrocyte Sedimentation Rate    C-Reactive Protein    Antinuclear Antibody (PATRICIA) Cascade    Rheumatoid Factor Quant    CCP Antibodies    Lyme Antibody Cascade    HM1 (CBC with Diff)       Had a conversation with the patient that this symptom is hard to workup through the walk-in care.  We will do Lyme disease screening which is high on the list of possibilities for her symptoms.  Additional arthralgia workup is completed and she will follow-up next week with her primary care provider to review her symptoms and the results of the laboratory workup.  Patient voiced understanding and I directed her attention to the [facility] patient education handout on arthritis.  Questions were answered to patient's satisfaction before discharge.    Patient Instructions   Multiple labs will be drawn but they will not be back today.  You may check on my chart and you will be contacted with positive results when they are available.  However, follow-up with primary care provider next week Wednesday 10/24/2018 for reevaluation of your symptoms and lab results.  May return to the emergency room or primary care provider in the interim if new concerns should arise in the interim    As a result of our visit today, here are the action plans for you:    1. Medication(s) to stop: There are no  discontinued medications.    2. Medication(s) to start or change: No medications were ordered this encounter      3. Other instructions: Yes     [facility] info handout on arthritis for patient education and symptomatic care.          Arthralgia    Arthralgia is the term for pain in or around the joint. It is a symptom, not a disease. This pain may involve one or more joints. In some cases, the pain moves from joint to joint.  There are many causes for joint pain. These include:    Injury    Osteoarthritis (wearing out of the joint surface)    Gout (inflammation of the joint due to crystals in the joint fluid)    Infection inside the joint      Bursitis (inflammation of the fluid-filled sacs around the joint)    Autoimmune disorders such as rheumatoid arthritis or lupus    Tendonitis (inflammation of chords that attach muscle to bone)  Home care    Rest the involved joint(s) until your symptoms improve.     You may be prescribed pain medicine. If none is prescribed, you may use acetaminophen or ibuprofen to control pain and inflammation.  Follow-up care  Follow up with your healthcare provider or as advised.  When to seek medical advice  Contact your healthcare provider right away if any of the following occurs:    Pain, swelling, or redness of joint increases    Pain worsens or recurs after a period of improvement    Pain moves to other joints    You cannot bear weight on the affected joint     You cannot move the affected joint    Joint appears deformed    New rash appears    Fever of 100.4 F (38 C) or higher, or as directed by your healthcare provider  Date Last Reviewed: 3/1/2017    4330-3180 [employer]. [address] [place] [address]. All rights reserved. This information is not intended as a substitute for professional medical care. Always follow your healthcare professional's instructions.        Arthralgia    Arthralgia is the term for pain in or around the joint. It is a symptom,  not a disease. This pain may involve one or more joints. In some cases, the pain moves from joint to joint.  There are many causes for joint pain. These include:    Injury    Osteoarthritis (wearing out of the joint surface)    Gout (inflammation of the joint due to crystals in the joint fluid)    Infection inside the joint      Bursitis (inflammation of the fluid-filled sacs around the joint)    Autoimmune disorders such as rheumatoid arthritis or lupus    Tendonitis (inflammation of chords that attach muscle to bone)  Home care    Rest the involved joint(s) until your symptoms improve.     You may be prescribed pain medicine. If none is prescribed, you may use acetaminophen or ibuprofen to control pain and inflammation.  Follow-up care  Follow up with your healthcare provider or as advised.  When to seek medical advice  Contact your healthcare provider right away if any of the following occurs:    Pain, swelling, or redness of joint increases    Pain worsens or recurs after a period of improvement    Pain moves to other joints    You cannot bear weight on the affected joint     You cannot move the affected joint    Joint appears deformed    New rash appears    Fever of 100.4 F (38 C) or higher, or as directed by your healthcare provider  Date Last Reviewed: 3/1/2017    5083-1581 [employer]. [address] [place] [address], [place] [address]. All rights reserved. This information is not intended as a substitute for professional medical care. Always follow your healthcare professional's instructions.

## 2021-07-03 NOTE — ADDENDUM NOTE
Addendum Note by Shavon Knott MLT at 2/28/2019  7:20 AM     Author: Shavon Knott MLT Service: -- Author Type:     Filed: 2/28/2019  8:40 AM Encounter Date: 2/28/2019 Status: Signed    : Shavon Knott MLT ()    Addended by: SHAVON KNOTT on: 2/28/2019 08:40 AM        Modules accepted: Orders

## 2021-07-21 ENCOUNTER — RECORDS - HEALTHEAST (OUTPATIENT)
Dept: ADMINISTRATIVE | Facility: CLINIC | Age: 56
End: 2021-07-21

## 2021-07-22 ENCOUNTER — RECORDS - HEALTHEAST (OUTPATIENT)
Dept: INTERNAL MEDICINE | Facility: CLINIC | Age: 56
End: 2021-07-22

## 2021-07-22 DIAGNOSIS — Z12.31 OTHER SCREENING MAMMOGRAM: ICD-10-CM

## 2021-08-03 PROBLEM — G61.81 CIDP (CHRONIC INFLAMMATORY DEMYELINATING POLYNEUROPATHY) (H): Status: RESOLVED | Noted: 2018-02-20 | Resolved: 2019-08-28

## 2021-10-04 ENCOUNTER — HEALTH MAINTENANCE LETTER (OUTPATIENT)
Age: 56
End: 2021-10-04

## 2021-10-05 ENCOUNTER — OFFICE VISIT (OUTPATIENT)
Dept: INTERNAL MEDICINE | Facility: CLINIC | Age: 56
End: 2021-10-05
Payer: COMMERCIAL

## 2021-10-05 VITALS
OXYGEN SATURATION: 98 % | HEIGHT: 64 IN | SYSTOLIC BLOOD PRESSURE: 120 MMHG | DIASTOLIC BLOOD PRESSURE: 74 MMHG | HEART RATE: 82 BPM | WEIGHT: 164 LBS | TEMPERATURE: 97.7 F | BODY MASS INDEX: 28 KG/M2

## 2021-10-05 DIAGNOSIS — G43.009 MIGRAINE WITHOUT AURA AND WITHOUT STATUS MIGRAINOSUS, NOT INTRACTABLE: Primary | ICD-10-CM

## 2021-10-05 DIAGNOSIS — Z12.31 VISIT FOR SCREENING MAMMOGRAM: ICD-10-CM

## 2021-10-05 DIAGNOSIS — E53.8 VITAMIN B12 DEFICIENCY (NON ANEMIC): ICD-10-CM

## 2021-10-05 DIAGNOSIS — E78.5 HYPERLIPIDEMIA, UNSPECIFIED HYPERLIPIDEMIA TYPE: ICD-10-CM

## 2021-10-05 LAB
CHOLEST SERPL-MCNC: 248 MG/DL
FASTING STATUS PATIENT QL REPORTED: YES
HDLC SERPL-MCNC: 54 MG/DL
LDLC SERPL CALC-MCNC: 175 MG/DL
TRIGL SERPL-MCNC: 94 MG/DL

## 2021-10-05 PROCEDURE — 90471 IMMUNIZATION ADMIN: CPT | Performed by: INTERNAL MEDICINE

## 2021-10-05 PROCEDURE — 36415 COLL VENOUS BLD VENIPUNCTURE: CPT | Performed by: INTERNAL MEDICINE

## 2021-10-05 PROCEDURE — 90682 RIV4 VACC RECOMBINANT DNA IM: CPT | Performed by: INTERNAL MEDICINE

## 2021-10-05 PROCEDURE — 80061 LIPID PANEL: CPT | Performed by: INTERNAL MEDICINE

## 2021-10-05 PROCEDURE — 99214 OFFICE O/P EST MOD 30 MIN: CPT | Mod: 25 | Performed by: INTERNAL MEDICINE

## 2021-10-05 RX ORDER — IVERMECTIN 10 MG/G
CREAM TOPICAL
COMMUNITY
Start: 2021-03-12

## 2021-10-05 RX ORDER — SUMATRIPTAN 100 MG/1
TABLET, FILM COATED ORAL
Qty: 30 TABLET | Refills: 3 | Status: SHIPPED | OUTPATIENT
Start: 2021-10-05 | End: 2022-10-17

## 2021-10-05 ASSESSMENT — MIFFLIN-ST. JEOR: SCORE: 1308.9

## 2021-10-05 NOTE — PROGRESS NOTES
Office Visit - Follow Up   Hill Odom   56 year old female    Date of Visit: 10/5/2021    Chief Complaint   Patient presents with     Follow Up     6 mo f/u - fasting today; flu shot         Assessment and Plan   1. Migraine without aura and without status migrainosus, not intractable  Continue as needed Imitrex.  Working well for her.  - SUMAtriptan (IMITREX) 100 MG tablet; TAKE 1 TABLET (100 MG TOTAL) BY MOUTH EVERY 2 (TWO) HOURS AS NEEDED FOR MIGRAINE.  Dispense: 30 tablet; Refill: 3    2. Hyperlipidemia, unspecified hyperlipidemia type  We will check lipids today.  She is having intermittent diarrhea.  I did recommend adding Metamucil which should help with the bowel regularity as well as lower cholesterol.  - Lipid panel reflex to direct LDL Fasting; Future    3. Visit for screening mammogram    - MA SCREENING DIGITAL BILAT - Future  (s+30); Future    4. Vitamin B12 deficiency (non anemic)  Continue B12.    She had some neuropathic issues or neurologic issues after her first shingles vaccine.  She is has not get the second.  We discussed the pros and cons of this.  We also discussed current recommendations for Covid booster shots.        Return in about 6 months (around 4/5/2022) for Routine preventive, with me, in person.     History of Present Illness   This 56 year old Ondina comes in for follow-up.  She needs refills on her sumatriptan.  She is getting 1 or 2 migraines a month.  She has hyperlipidemia and is on lifestyle management only.  She is due for mammogram.  She is feeling good.  Wonders about Covid boosters.  Wonders about her shingles vaccine.  Denies new somatic concerns for me.  Trying to be good about Covid precautions.    Review of Systems: A comprehensive review of systems was negative except as noted.     Medications, Allergies and Problem List   Reviewed, reconciled and updated  Post Discharge Medication Reconciliation Status:      Physical Exam   General Appearance:   Pleasant woman  "who looks well and is in no distress.    /74 (BP Location: Left arm, Patient Position: Sitting, Cuff Size: Adult Small)   Pulse 82   Temp 97.7  F (36.5  C)   Ht 1.626 m (5' 4\")   Wt 74.4 kg (164 lb)   SpO2 98%   BMI 28.15 kg/m           Additional Information   Current Outpatient Medications   Medication Sig Dispense Refill     ammonium lactate (AMLACTIN) 12 % external cream Apply topically 2 times daily 385 g 1     Cholecalciferol 4000 UNITS CAPS Take 1 capsule by mouth       cyanocobalamin (VITAMIN  B-12) 1000 MCG tablet Take 1,000 mcg by mouth once a week       Multiple Vitamins-Minerals (MULTIVITAMIN & MINERAL PO) Take 1 tablet by mouth daily       SOOLANTRA 1 % cream APPLY EXTERNALLY TO FACE DAILY FOR ROSACEA       SUMAtriptan (IMITREX) 100 MG tablet TAKE 1 TABLET (100 MG TOTAL) BY MOUTH EVERY 2 (TWO) HOURS AS NEEDED FOR MIGRAINE. 30 tablet 3     Allergies   Allergen Reactions     Amoxicillin      Okay for penicillin---see allergy note from 1/4/16  hives     Amoxicillin-Pot Clavulanate      Need to avoid amoxicillin, had non-specific drug rash to amox---okay for penicillin, see allergy note 1/4/16     Augmentin Other (See Comments)     Ciprofloxacin Rash     Doxycycline Rash and Other (See Comments)     Itchy/rash on face, swollen in hands and feet     Iron Rash     Simvastatin Rash     Social History     Tobacco Use     Smoking status: Never Smoker     Smokeless tobacco: Never Used   Substance Use Topics     Alcohol use: No     Drug use: No       Review and/or order of clinical lab tests:  Review and/or order of radiology tests:  Review and/or order of medicine tests:  Discussion of test results with performing physician:  Decision to obtain old records and/or obtain history from someone other than the patient:  Review and summarization of old records and/or obtaining history from someone other than the patient and.or discussion of case with another health care provider:  Independent visualization " of image, tracing or specimen itself:    Time:      YESSENIA BOYER MD

## 2021-10-05 NOTE — LETTER
October 6, 2021      Hill Odom  [address] [place] [address]        Dear ,    We are writing to inform you of your test results.    These are pretty consistent with past numbers Destinee.         Resulted Orders   Lipid panel reflex to direct LDL Fasting   Result Value Ref Range    Cholesterol 248 (H) <=199 mg/dL    Triglycerides 94 <=149 mg/dL    Direct Measure HDL 54 >=50 mg/dL      Comment:      HDL Cholesterol Reference Range:     0-2 years:   No reference ranges established for patients under 2 years old  at [facility] for lipid analytes.    2-8 years:  Greater than 45 mg/dL     18 years and older:   Female: Greater than or equal to 50 mg/dL   Male:   Greater than or equal to 40 mg/dL    LDL Cholesterol Calculated 175 (H) <=129 mg/dL    Patient Fasting > 8hrs? Yes        If you have any questions or concerns, please call the clinic at the number listed above.       Sincerely,      Matt Sung MD

## 2021-12-09 ENCOUNTER — IMMUNIZATION (OUTPATIENT)
Dept: NURSING | Facility: CLINIC | Age: 56
End: 2021-12-09
Payer: COMMERCIAL

## 2021-12-09 ENCOUNTER — ANCILLARY PROCEDURE (OUTPATIENT)
Dept: MAMMOGRAPHY | Facility: CLINIC | Age: 56
End: 2021-12-09
Attending: INTERNAL MEDICINE
Payer: COMMERCIAL

## 2021-12-09 DIAGNOSIS — Z12.31 VISIT FOR SCREENING MAMMOGRAM: ICD-10-CM

## 2021-12-09 PROCEDURE — 77067 SCR MAMMO BI INCL CAD: CPT

## 2021-12-09 PROCEDURE — 91300 PR COVID VAC PFIZER DIL RECON 30 MCG/0.3 ML IM: CPT

## 2021-12-09 PROCEDURE — 0004A PR COVID VAC PFIZER DIL RECON 30 MCG/0.3 ML IM: CPT

## 2022-03-29 ENCOUNTER — OFFICE VISIT (OUTPATIENT)
Dept: INTERNAL MEDICINE | Facility: CLINIC | Age: 57
End: 2022-03-29
Payer: COMMERCIAL

## 2022-03-29 VITALS
OXYGEN SATURATION: 95 % | SYSTOLIC BLOOD PRESSURE: 118 MMHG | WEIGHT: 167 LBS | DIASTOLIC BLOOD PRESSURE: 72 MMHG | HEART RATE: 76 BPM | HEIGHT: 65 IN | BODY MASS INDEX: 27.82 KG/M2 | TEMPERATURE: 97.8 F

## 2022-03-29 DIAGNOSIS — R53.83 FATIGUE, UNSPECIFIED TYPE: ICD-10-CM

## 2022-03-29 DIAGNOSIS — Z00.00 ROUTINE GENERAL MEDICAL EXAMINATION AT A HEALTH CARE FACILITY: Primary | ICD-10-CM

## 2022-03-29 DIAGNOSIS — E78.5 HYPERLIPIDEMIA, UNSPECIFIED HYPERLIPIDEMIA TYPE: ICD-10-CM

## 2022-03-29 DIAGNOSIS — G43.009 MIGRAINE WITHOUT AURA AND WITHOUT STATUS MIGRAINOSUS, NOT INTRACTABLE: ICD-10-CM

## 2022-03-29 DIAGNOSIS — E53.8 VITAMIN B12 DEFICIENCY (NON ANEMIC): ICD-10-CM

## 2022-03-29 DIAGNOSIS — M85.80 OSTEOPENIA, UNSPECIFIED LOCATION: ICD-10-CM

## 2022-03-29 LAB
ALBUMIN SERPL-MCNC: 4 G/DL (ref 3.5–5)
ALBUMIN UR-MCNC: NEGATIVE MG/DL
ALP SERPL-CCNC: 100 U/L (ref 45–120)
ALT SERPL W P-5'-P-CCNC: 12 U/L (ref 0–45)
ANION GAP SERPL CALCULATED.3IONS-SCNC: 12 MMOL/L (ref 5–18)
APPEARANCE UR: CLEAR
AST SERPL W P-5'-P-CCNC: 15 U/L (ref 0–40)
BACTERIA #/AREA URNS HPF: ABNORMAL /HPF
BILIRUB SERPL-MCNC: 0.6 MG/DL (ref 0–1)
BILIRUB UR QL STRIP: NEGATIVE
BUN SERPL-MCNC: 13 MG/DL (ref 8–22)
CALCIUM SERPL-MCNC: 9.6 MG/DL (ref 8.5–10.5)
CHLORIDE BLD-SCNC: 103 MMOL/L (ref 98–107)
CHOLEST SERPL-MCNC: 242 MG/DL
CO2 SERPL-SCNC: 24 MMOL/L (ref 22–31)
COLOR UR AUTO: YELLOW
CREAT SERPL-MCNC: 0.79 MG/DL (ref 0.6–1.1)
ERYTHROCYTE [DISTWIDTH] IN BLOOD BY AUTOMATED COUNT: 12.7 % (ref 10–15)
FASTING STATUS PATIENT QL REPORTED: ABNORMAL
GFR SERPL CREATININE-BSD FRML MDRD: 86 ML/MIN/1.73M2
GLUCOSE BLD-MCNC: 86 MG/DL (ref 70–125)
GLUCOSE UR STRIP-MCNC: NEGATIVE MG/DL
HCT VFR BLD AUTO: 41.9 % (ref 35–47)
HDLC SERPL-MCNC: 51 MG/DL
HGB BLD-MCNC: 13.4 G/DL (ref 11.7–15.7)
HGB UR QL STRIP: ABNORMAL
KETONES UR STRIP-MCNC: NEGATIVE MG/DL
LDLC SERPL CALC-MCNC: 172 MG/DL
LEUKOCYTE ESTERASE UR QL STRIP: NEGATIVE
MCH RBC QN AUTO: 28 PG (ref 26.5–33)
MCHC RBC AUTO-ENTMCNC: 32 G/DL (ref 31.5–36.5)
MCV RBC AUTO: 88 FL (ref 78–100)
NITRATE UR QL: NEGATIVE
PH UR STRIP: 6 [PH] (ref 5–8)
PLATELET # BLD AUTO: 369 10E3/UL (ref 150–450)
POTASSIUM BLD-SCNC: 3.9 MMOL/L (ref 3.5–5)
PROT SERPL-MCNC: 7.1 G/DL (ref 6–8)
RBC # BLD AUTO: 4.79 10E6/UL (ref 3.8–5.2)
RBC #/AREA URNS AUTO: ABNORMAL /HPF
SODIUM SERPL-SCNC: 139 MMOL/L (ref 136–145)
SP GR UR STRIP: 1.02 (ref 1–1.03)
SQUAMOUS #/AREA URNS AUTO: ABNORMAL /LPF
TRIGL SERPL-MCNC: 95 MG/DL
TSH SERPL DL<=0.005 MIU/L-ACNC: 1.26 UIU/ML (ref 0.3–5)
UROBILINOGEN UR STRIP-ACNC: 0.2 E.U./DL
VIT B12 SERPL-MCNC: 452 PG/ML (ref 213–816)
WBC # BLD AUTO: 6.1 10E3/UL (ref 4–11)
WBC #/AREA URNS AUTO: ABNORMAL /HPF

## 2022-03-29 PROCEDURE — 99396 PREV VISIT EST AGE 40-64: CPT | Performed by: INTERNAL MEDICINE

## 2022-03-29 PROCEDURE — 99214 OFFICE O/P EST MOD 30 MIN: CPT | Mod: 25 | Performed by: INTERNAL MEDICINE

## 2022-03-29 PROCEDURE — 80061 LIPID PANEL: CPT | Performed by: INTERNAL MEDICINE

## 2022-03-29 PROCEDURE — 36415 COLL VENOUS BLD VENIPUNCTURE: CPT | Performed by: INTERNAL MEDICINE

## 2022-03-29 PROCEDURE — 80053 COMPREHEN METABOLIC PANEL: CPT | Performed by: INTERNAL MEDICINE

## 2022-03-29 PROCEDURE — 84443 ASSAY THYROID STIM HORMONE: CPT | Performed by: INTERNAL MEDICINE

## 2022-03-29 PROCEDURE — 82607 VITAMIN B-12: CPT | Performed by: INTERNAL MEDICINE

## 2022-03-29 PROCEDURE — 81001 URINALYSIS AUTO W/SCOPE: CPT | Performed by: INTERNAL MEDICINE

## 2022-03-29 PROCEDURE — 85027 COMPLETE CBC AUTOMATED: CPT | Performed by: INTERNAL MEDICINE

## 2022-03-29 PROCEDURE — 82306 VITAMIN D 25 HYDROXY: CPT | Performed by: INTERNAL MEDICINE

## 2022-03-29 ASSESSMENT — PATIENT HEALTH QUESTIONNAIRE - PHQ9: SUM OF ALL RESPONSES TO PHQ QUESTIONS 1-9: 5

## 2022-03-29 NOTE — LETTER
April 4, 2022      Destinee Odom  [address] [address]        Dear ,    We are writing to inform you of your test results.    Destinee, your cholesterol is stable and everything else here looks perfectly normal.  Excellent!         Resulted Orders   Lipid panel reflex to direct LDL Fasting   Result Value Ref Range    Cholesterol 242 (H) <=199 mg/dL    Triglycerides 95 <=149 mg/dL    Direct Measure HDL 51 >=50 mg/dL      Comment:      HDL Cholesterol Reference Range:     0-2 years:   No reference ranges established for patients under 2 years old  at [email or web address] for lipid analytes.    2-8 years:  Greater than 45 mg/dL     18 years and older:   Female: Greater than or equal to 50 mg/dL   Male:   Greater than or equal to 40 mg/dL    LDL Cholesterol Calculated 172 (H) <=129 mg/dL    Patient Fasting > 8hrs? Unknown    CBC with platelets   Result Value Ref Range    WBC Count 6.1 4.0 - 11.0 10e3/uL    RBC Count 4.79 3.80 - 5.20 10e6/uL    Hemoglobin 13.4 11.7 - 15.7 g/dL    Hematocrit 41.9 35.0 - 47.0 %    MCV 88 78 - 100 fL    MCH 28.0 26.5 - 33.0 pg    MCHC 32.0 31.5 - 36.5 g/dL    RDW 12.7 10.0 - 15.0 %    Platelet Count 369 150 - 450 10e3/uL   Vitamin B12   Result Value Ref Range    Vitamin B12 452 213 - 816 pg/mL   Vitamin D Deficiency   Result Value Ref Range    Vitamin D, Total (25-Hydroxy) 38 20 - 75 ug/L    Narrative    Season, race, dietary intake, and treatment affect the concentration of 25-hydroxy-Vitamin D. Values may decrease during winter months and increase during summer months. Values 20-29 ug/L may indicate Vitamin D insufficiency and values <20 ug/L may indicate Vitamin D deficiency.    Vitamin D determination is routinely performed by an immunoassay specific for 25 hydroxyvitamin D3.  If an individual is on vitamin D2(ergocalciferol) supplementation, please specify 25 OH vitamin D2 and D3 level determination by LCMSMS test VITD23.     Comprehensive metabolic panel (BMP +  Alb, Alk Phos, ALT, AST, Total. Bili, TP)   Result Value Ref Range    Sodium 139 136 - 145 mmol/L    Potassium 3.9 3.5 - 5.0 mmol/L    Chloride 103 98 - 107 mmol/L    Carbon Dioxide (CO2) 24 22 - 31 mmol/L    Anion Gap 12 5 - 18 mmol/L    Urea Nitrogen 13 8 - 22 mg/dL    Creatinine 0.79 0.60 - 1.10 mg/dL    Calcium 9.6 8.5 - 10.5 mg/dL    Glucose 86 70 - 125 mg/dL    Alkaline Phosphatase 100 45 - 120 U/L    AST 15 0 - 40 U/L    ALT 12 0 - 45 U/L    Protein Total 7.1 6.0 - 8.0 g/dL    Albumin 4.0 3.5 - 5.0 g/dL    Bilirubin Total 0.6 0.0 - 1.0 mg/dL    GFR Estimate 86 >60 mL/min/1.73m2      Comment:      Effective December 21, 2021 eGFRcr in adults is calculated using the 2021 CKD-EPI creatinine equation which includes age and gender (Zoila et al., NEJ, DOI: 10.1056/FKIQhf0496092)   TSH with free T4 reflex   Result Value Ref Range    TSH 1.26 0.30 - 5.00 uIU/mL   UA Macro with Reflex to Micro and Culture - lab collect   Result Value Ref Range    Color Urine Yellow Colorless, Straw, Light Yellow, Yellow    Appearance Urine Clear Clear    Glucose Urine Negative Negative mg/dL    Bilirubin Urine Negative Negative    Ketones Urine Negative Negative mg/dL    Specific Gravity Urine 1.025 1.005 - 1.030    Blood Urine Trace (A) Negative    pH Urine 6.0 5.0 - 8.0    Protein Albumin Urine Negative Negative mg/dL    Urobilinogen Urine 0.2 0.2, 1.0 E.U./dL    Nitrite Urine Negative Negative    Leukocyte Esterase Urine Negative Negative   Urine Microscopic   Result Value Ref Range    Bacteria Urine None Seen None Seen /HPF    RBC Urine 0-2 0-2 /HPF /HPF    WBC Urine 0-5 0-5 /HPF /HPF    Squamous Epithelials Urine Few (A) None Seen /LPF    Narrative    Urine Culture not indicated       If you have any questions or concerns, please call the clinic at the number listed above.       Sincerely,      Matt Sung MD

## 2022-03-29 NOTE — PROGRESS NOTES
"   SUBJECTIVE:   CC: Hill Odom is an 56 year old woman who presents for preventive health visit.     Destinee comes in today for physical.  She reports she is doing been very fatigued the last month or 2.  She has not been taking her medications.  She has poor motivation to do things although she does do them eventually.  She tells me she is just \"lazy\".  Denies depressive symptoms.  She is been having more migraines.  She has been under a lot of stress.  Taking care of her mother who lives in [place].  She denies other new somatic concerns for me.  She is looking forward to hopefully getting back to work in person at least once or twice a week.  Patient has been advised of split billing requirements and indicates understanding: Yes  Healthy Habits:     Getting at least 3 servings of Calcium per day:  Yes    Bi-annual eye exam:  Yes    Dental care twice a year:  Yes    Sleep apnea or symptoms of sleep apnea:  None    Diet:  Regular (no restrictions)    Frequency of exercise:  2-3 days/week    Duration of exercise:  30-45 minutes    Taking medications regularly:  Yes    PHQ-2 Total Score: 0    Additional concerns today:  No              Today's PHQ-2 Score:   PHQ-2 ( 1999 [employer]) 3/29/2022   Q1: Little interest or pleasure in doing things 0   Q2: Feeling down, depressed or hopeless 0   PHQ-2 Score 0   Q1: Little interest or pleasure in doing things -   Q2: Feeling down, depressed or hopeless -   PHQ-2 Score -       Abuse: Current or Past (Physical, Sexual or Emotional) - No  Do you feel safe in your environment? Yes    Have you ever done Advance Care Planning? (For example, a Health Directive, POLST, or a discussion with a medical provider or your loved ones about your wishes): No, advance care planning information given to patient to review.  Patient plans to discuss their wishes with loved ones or provider.      Social History     Tobacco Use     Smoking status: Never Smoker     Smokeless tobacco: Never " "Used   Substance Use Topics     Alcohol use: No     If you drink alcohol do you typically have >3 drinks per day or >7 drinks per week? No    Alcohol Use 3/28/2022   Prescreen: >3 drinks/day or >7 drinks/week? No       Reviewed orders with patient.  Reviewed health maintenance and updated orders accordingly -       Breast Cancer Screening:    Breast CA Risk Assessment (FHS-7) 3/28/2022   Do you have a family history of breast, colon, or ovarian cancer? No / Unknown         Mammogram Screening: Recommended mammography every 1-2 years with patient discussion and risk factor consideration  Pertinent mammograms are reviewed under the imaging tab.    History of abnormal Pap smear: Status post benign hysterectomy. Health Maintenance and Surgical History updated.  PAP / HPV 1/21/2015   PAP Negative for squamous intraepithelial lesion or malignancy  Electronically signed by Shahriar Patel CT (ASCP) on 1/30/2015 at 11:54 AM       Reviewed and updated as needed this visit by clinical staff   Tobacco  Allergies  Meds              Reviewed and updated as needed this visit by Provider                     Review of Systems   ROS: 10 point ROS neg other than the symptoms noted above in the HPI.     OBJECTIVE:   Ht 1.651 m (5' 5\")   Wt 75.8 kg (167 lb)   BMI 27.79 kg/m    Physical Exam  GENERAL APPEARANCE: healthy, alert and no distress  EYES: Eyes grossly normal to inspection, PERRL and conjunctivae and sclerae normal  HENT: ear canals and TM's normal, nose and mouth without ulcers or lesions, oropharynx clear and oral mucous membranes moist  NECK: no adenopathy, no asymmetry, masses, or scars and thyroid normal to palpation  RESP: lungs clear to auscultation - no rales, rhonchi or wheezes  CV: regular rate and rhythm, normal S1 S2, no S3 or S4, no murmur, click or rub, no peripheral edema and peripheral pulses strong  ABDOMEN: soft, nontender, no hepatosplenomegaly, no masses and bowel sounds normal  MS: no " musculoskeletal defects are noted and gait is age appropriate without ataxia  SKIN: no suspicious lesions or rashes  NEURO: Normal strength and tone, sensory exam grossly normal, mentation intact and speech normal  PSYCH: mentation appears normal and affect normal/bright        ASSESSMENT/PLAN:   1. Routine general medical examination at a health care facility  We discussed giving up the soda pop.  Or at least cutting back.  More regular exercise.  We discussed the shingles vaccine and she may get her second 1 but she is still on the fence about that.  Otherwise she is up-to-date and lives an active and healthy lifestyle  - Lipid panel reflex to direct LDL Fasting; Future  - CBC with platelets; Future  - Vitamin B12; Future  - Vitamin D Deficiency; Future  - Comprehensive metabolic panel (BMP + Alb, Alk Phos, ALT, AST, Total. Bili, TP); Future  - TSH with free T4 reflex; Future  - UA Macro with Reflex to Micro and Culture - lab collect; Future  - Lipid panel reflex to direct LDL Fasting  - CBC with platelets  - Vitamin B12  - Vitamin D Deficiency  - Comprehensive metabolic panel (BMP + Alb, Alk Phos, ALT, AST, Total. Bili, TP)  - TSH with free T4 reflex  - UA Macro with Reflex to Micro and Culture - lab collect    2. Hyperlipidemia, unspecified hyperlipidemia type  Lipids for monitoring.  - Lipid panel reflex to direct LDL Fasting; Future  - Lipid panel reflex to direct LDL Fasting    3. Vitamin B12 deficiency (non anemic)  Continue her B12 supplementation.  - CBC with platelets; Future  - Vitamin B12; Future  - CBC with platelets  - Vitamin B12    4. Migraine without aura and without status migrainosus, not intractable  Continue as needed Imitrex    5. Osteopenia, unspecified location  Bone density will be done in another 3 years.  Calcium vitamin D and regular exercise urged    6. Fatigue, unspecified type  Probably multifactorial and situational.  Will touch base another month to see how she is doing.  We  "discussed medications for mood/seasonal affect of or counseling and she declines both.  She does have a PHQ-9 of 5 today.  - CBC with platelets; Future  - Vitamin B12; Future  - Vitamin D Deficiency; Future  - TSH with free T4 reflex; Future  - CBC with platelets  - Vitamin B12  - Vitamin D Deficiency  - TSH with free T4 reflex    Patient has been advised of split billing requirements and indicates understanding: Yes    COUNSELING:  Reviewed preventive health counseling, as reflected in patient instructions    Estimated body mass index is 27.79 kg/m  as calculated from the following:    Height as of this encounter: 1.651 m (5' 5\").    Weight as of this encounter: 75.8 kg (167 lb).    Weight management plan: Discussed healthy diet and exercise guidelines    She reports that she has never smoked. She has never used smokeless tobacco.      Counseling Resources:  ATP IV Guidelines  Pooled Cohorts Equation Calculator  Breast Cancer Risk Calculator  BRCA-Related Cancer Risk Assessment: FHS-7 Tool  FRAX Risk Assessment  ICSI Preventive Guidelines  Dietary Guidelines for Americans, 2010  USDA's MyPlate  ASA Prophylaxis  Lung CA Screening    YESSENIA BOYER MD  [facility]  "

## 2022-03-30 LAB — DEPRECATED CALCIDIOL+CALCIFEROL SERPL-MC: 38 UG/L (ref 20–75)

## 2022-04-27 ENCOUNTER — VIRTUAL VISIT (OUTPATIENT)
Dept: INTERNAL MEDICINE | Facility: CLINIC | Age: 57
End: 2022-04-27
Payer: COMMERCIAL

## 2022-04-27 DIAGNOSIS — R53.83 FATIGUE, UNSPECIFIED TYPE: Primary | ICD-10-CM

## 2022-04-27 PROCEDURE — 99213 OFFICE O/P EST LOW 20 MIN: CPT | Mod: 95 | Performed by: INTERNAL MEDICINE

## 2022-04-27 ASSESSMENT — PATIENT HEALTH QUESTIONNAIRE - PHQ9: SUM OF ALL RESPONSES TO PHQ QUESTIONS 1-9: 5

## 2022-04-27 NOTE — PROGRESS NOTES
Destinee is a 56 year old who is being evaluated via a billable video visit.      How would you like to obtain your AVS? MyChart  If the video visit is dropped, the invitation should be resent by: Send to e-mail at: [email or web address]  Will anyone else be joining your video visit? No      Video Start Time: 1657      Office Visit - Follow Up   Hill Odom   56 year old female    Date of Visit: 4/27/2022    Chief Complaint   Patient presents with     Fatigue     Pt reports fatigue slightly has improved; believes her sxs's are more situational          Assessment and Plan   1. Fatigue, unspecified type  Multifactorial due to a lot of things but I think things are going to turn around and she does 2.  She is only just mild melancholy really.  Reassurance.  I think it is summer, she will feel much better.  She wonders about her weight and I reassured her that she really does not have any obesity related illness and does not even have obesity and as long as she is active I am not too concerned about her weight.  I will see her back in the fall for her 6-month follow-up.  Sooner if anything comes up        Return in about 6 months (around 10/27/2022) for Follow up, with me, in person.     History of Present Illness   This 56 year old patient joins me on video for follow-up of her fatigue.  She had some mild seasonal affect of type symptoms and fatigue at our last visit.  She tells me she is doing fine.  She thinks this is situational.  Looking forward to getting back into the office starting next week and hopefully getting outside if the weather ever gets nice.  She denies new concerns.  She wonders about losing weight.  She states that her mother thinks she is too heavy.    Review of Systems: A comprehensive review of systems was negative except as noted.     Medications, Allergies and Problem List   Reviewed, reconciled and updated  Post Discharge Medication Reconciliation Status:   Social History     Social History  Narrative     Not on file        Physical Exam   General Appearance:   She looks good and is in good spirits today.    There were no vitals taken for this visit.         Additional Information   Current Outpatient Medications   Medication Sig Dispense Refill     Cholecalciferol 4000 UNITS CAPS Take 1 capsule by mouth       cyanocobalamin (VITAMIN  B-12) 1000 MCG tablet Take 1,000 mcg by mouth once a week       Multiple Vitamins-Minerals (MULTIVITAMIN & MINERAL PO) Take 1 tablet by mouth daily       SOOLANTRA 1 % cream APPLY EXTERNALLY TO FACE DAILY FOR ROSACEA       SUMAtriptan (IMITREX) 100 MG tablet TAKE 1 TABLET (100 MG TOTAL) BY MOUTH EVERY 2 (TWO) HOURS AS NEEDED FOR MIGRAINE. 30 tablet 3     Allergies   Allergen Reactions     Amoxicillin      Okay for penicillin---see allergy note from 1/4/16  hives     Amoxicillin-Pot Clavulanate      Need to avoid amoxicillin, had non-specific drug rash to amox---okay for penicillin, see allergy note 1/4/16     Augmentin Other (See Comments)     Ciprofloxacin Rash     Doxycycline Rash and Other (See Comments)     Itchy/rash on face, swollen in hands and feet     Iron Rash     Simvastatin Rash     Social History     Tobacco Use     Smoking status: Never Smoker     Smokeless tobacco: Never Used   Substance Use Topics     Alcohol use: No     Drug use: No       Review and/or order of clinical lab tests:  Review and/or order of radiology tests:  Review and/or order of medicine tests:  Discussion of test results with performing physician:  Decision to obtain old records and/or obtain history from someone other than the patient:  Review and summarization of old records and/or obtaining history from someone other than the patient and.or discussion of case with another health care provider:  Independent visualization of image, tracing or specimen itself:    Time:      YESSENIA BOYER MD    Video-Visit Details    Type of service:  Video Visit    Video End Time:1705    Originating  Location (pt. Location): Home    Distant Location (provider location):  [facility]     Platform used for Video Visit: [employer]ud

## 2022-04-28 ENCOUNTER — TELEPHONE (OUTPATIENT)
Dept: INTERNAL MEDICINE | Facility: CLINIC | Age: 57
End: 2022-04-28
Payer: COMMERCIAL

## 2022-06-04 ENCOUNTER — MYC MEDICAL ADVICE (OUTPATIENT)
Dept: INTERNAL MEDICINE | Facility: CLINIC | Age: 57
End: 2022-06-04
Payer: COMMERCIAL

## 2022-06-04 DIAGNOSIS — L71.9 ROSACEA: Primary | ICD-10-CM

## 2022-07-18 ENCOUNTER — TRANSFERRED RECORDS (OUTPATIENT)
Dept: HEALTH INFORMATION MANAGEMENT | Facility: CLINIC | Age: 57
End: 2022-07-18

## 2022-09-11 ENCOUNTER — HEALTH MAINTENANCE LETTER (OUTPATIENT)
Age: 57
End: 2022-09-11

## 2022-10-17 ENCOUNTER — OFFICE VISIT (OUTPATIENT)
Dept: INTERNAL MEDICINE | Facility: CLINIC | Age: 57
End: 2022-10-17
Payer: COMMERCIAL

## 2022-10-17 VITALS
TEMPERATURE: 98 F | OXYGEN SATURATION: 99 % | HEIGHT: 65 IN | WEIGHT: 175 LBS | SYSTOLIC BLOOD PRESSURE: 120 MMHG | HEART RATE: 76 BPM | BODY MASS INDEX: 29.16 KG/M2 | DIASTOLIC BLOOD PRESSURE: 62 MMHG

## 2022-10-17 DIAGNOSIS — G43.009 MIGRAINE WITHOUT AURA AND WITHOUT STATUS MIGRAINOSUS, NOT INTRACTABLE: Primary | ICD-10-CM

## 2022-10-17 DIAGNOSIS — Z90.711 HISTORY OF HYSTERECTOMY, SUPRACERVICAL: ICD-10-CM

## 2022-10-17 DIAGNOSIS — E53.8 VITAMIN B12 DEFICIENCY (NON ANEMIC): ICD-10-CM

## 2022-10-17 DIAGNOSIS — H91.91 HEARING LOSS OF RIGHT EAR, UNSPECIFIED HEARING LOSS TYPE: ICD-10-CM

## 2022-10-17 PROCEDURE — 90471 IMMUNIZATION ADMIN: CPT | Performed by: INTERNAL MEDICINE

## 2022-10-17 PROCEDURE — 91312 COVID-19,PF,PFIZER BOOSTER BIVALENT: CPT | Performed by: INTERNAL MEDICINE

## 2022-10-17 PROCEDURE — 99213 OFFICE O/P EST LOW 20 MIN: CPT | Mod: 25 | Performed by: INTERNAL MEDICINE

## 2022-10-17 PROCEDURE — 90682 RIV4 VACC RECOMBINANT DNA IM: CPT | Performed by: INTERNAL MEDICINE

## 2022-10-17 PROCEDURE — 0124A COVID-19,PF,PFIZER BOOSTER BIVALENT: CPT | Performed by: INTERNAL MEDICINE

## 2022-10-17 RX ORDER — SUMATRIPTAN 100 MG/1
TABLET, FILM COATED ORAL
Qty: 30 TABLET | Refills: 3 | Status: SHIPPED | OUTPATIENT
Start: 2022-10-17 | End: 2024-01-09

## 2022-10-17 NOTE — PROGRESS NOTES
"  Assessment & Plan     1. Migraine without aura and without status migrainosus, not intractable  She has been having more migraines lately she tells me.  Renew her sumatriptan hand which works well for her.  Try to avoid triggers.  - SUMAtriptan (IMITREX) 100 MG tablet; TAKE 1 TABLET (100 MG TOTAL) BY MOUTH EVERY 2 (TWO) HOURS AS NEEDED FOR MIGRAINE.  Dispense: 30 tablet; Refill: 3    2. Hearing loss of right ear, unspecified hearing loss type  We will have her meet with audiology given her diminished hearing on the right.  - REMOVE IMPACTED CERUMEN  - Adult Audiology  Referral; Future    3. Vitamin B12 deficiency (non anemic)  Continue B12.    4. History of hysterectomy, supracervical  I was not aware that her hysterectomy was supracervical.  I have asked that she meet with gynecologist to determine if she needs Paps and how often.  I do not believe she has had abnormal Paps.  - Ob/Gyn Referral; Future             BMI:   Estimated body mass index is 29.12 kg/m  as calculated from the following:    Height as of this encounter: 1.651 m (5' 5\").    Weight as of this encounter: 79.4 kg (175 lb).           Return in about 6 months (around 4/17/2023) for Routine preventive, with me, in person.    YESSENIA BOYER MD  [facility]   Destinee is a 57 year old, presenting for the following health issues:  Follow Up (6 mo - fasting; flu shot & Covid booster ) and Ear Fullness (Check RT ear for wax)      History of Present Illness       Reason for visit:  6 month follow up    She eats 2-3 servings of fruits and vegetables daily.She consumes 1 sweetened beverage(s) daily.She exercises with enough effort to increase her heart rate 10 to 19 minutes per day.  She exercises with enough effort to increase her heart rate 3 or less days per week.          Destinee comes in today for follow-up of her multi medical problems.  She notes right hearing loss.  She wonders if she needs a Pap.  She " "had a supracervical hysterectomy apparently.  She does not have a gynecologist at this point.  She wonders about her cholesterol and if that needs to be tested today.  She has known history of hypercholesterolemia but no personal or family history of coronary disease.  Plan is to do another heart scan next year to further evaluate.  She has several family members who are intolerant of statins she tells me.  She is trying to be active.  Denies other concerns    Review of Systems         Objective    /62 (BP Location: Left arm, Patient Position: Sitting, Cuff Size: Adult Regular)   Pulse 76   Temp 98  F (36.7  C)   Ht 1.651 m (5' 5\")   Wt 79.4 kg (175 lb)   SpO2 99%   BMI 29.12 kg/m    Body mass index is 29.12 kg/m .  Physical Exam       Pleasant woman.  Small amount of cerumen on right.  More impacted on the left.                "

## 2022-11-01 ENCOUNTER — LAB REQUISITION (OUTPATIENT)
Dept: LAB | Facility: CLINIC | Age: 57
End: 2022-11-01

## 2022-11-01 DIAGNOSIS — Z01.419 ENCOUNTER FOR GYNECOLOGICAL EXAMINATION (GENERAL) (ROUTINE) WITHOUT ABNORMAL FINDINGS: ICD-10-CM

## 2022-11-01 PROCEDURE — 87624 HPV HI-RISK TYP POOLED RSLT: CPT | Performed by: STUDENT IN AN ORGANIZED HEALTH CARE EDUCATION/TRAINING PROGRAM

## 2022-11-01 PROCEDURE — G0145 SCR C/V CYTO,THINLAYER,RESCR: HCPCS | Performed by: STUDENT IN AN ORGANIZED HEALTH CARE EDUCATION/TRAINING PROGRAM

## 2022-11-03 LAB
BKR LAB AP GYN ADEQUACY: NORMAL
BKR LAB AP GYN INTERPRETATION: NORMAL
BKR LAB AP HPV REFLEX: NORMAL
BKR LAB AP LMP: NORMAL
BKR LAB AP PREVIOUS ABNL DX: NORMAL
BKR LAB AP PREVIOUS ABNORMAL: NORMAL
PATH REPORT.COMMENTS IMP SPEC: NORMAL
PATH REPORT.COMMENTS IMP SPEC: NORMAL
PATH REPORT.RELEVANT HX SPEC: NORMAL

## 2022-11-07 LAB
HUMAN PAPILLOMA VIRUS 16 DNA: NEGATIVE
HUMAN PAPILLOMA VIRUS 18 DNA: NEGATIVE
HUMAN PAPILLOMA VIRUS FINAL DIAGNOSIS: NORMAL
HUMAN PAPILLOMA VIRUS OTHER HR: NEGATIVE

## 2022-12-13 ENCOUNTER — ANCILLARY PROCEDURE (OUTPATIENT)
Dept: MAMMOGRAPHY | Facility: CLINIC | Age: 57
End: 2022-12-13
Attending: INTERNAL MEDICINE
Payer: COMMERCIAL

## 2022-12-13 DIAGNOSIS — Z12.31 VISIT FOR SCREENING MAMMOGRAM: ICD-10-CM

## 2022-12-13 PROCEDURE — 77067 SCR MAMMO BI INCL CAD: CPT

## 2023-03-06 ENCOUNTER — OFFICE VISIT (OUTPATIENT)
Dept: AUDIOLOGY | Facility: CLINIC | Age: 58
End: 2023-03-06
Attending: INTERNAL MEDICINE
Payer: COMMERCIAL

## 2023-03-06 DIAGNOSIS — H90.3 SENSORINEURAL HEARING LOSS, BILATERAL: Primary | ICD-10-CM

## 2023-03-06 DIAGNOSIS — H91.91 HEARING LOSS OF RIGHT EAR, UNSPECIFIED HEARING LOSS TYPE: ICD-10-CM

## 2023-03-06 PROCEDURE — 92567 TYMPANOMETRY: CPT | Performed by: AUDIOLOGIST

## 2023-03-06 PROCEDURE — 92557 COMPREHENSIVE HEARING TEST: CPT | Performed by: AUDIOLOGIST

## 2023-03-06 NOTE — PROGRESS NOTES
AUDIOLOGY REPORT    SUBJECTIVE: Hill Odom is a 57 year old female who was seen in the Audiology Clinic at the [facility] for audiologic evaluation, referred by, Matt Sung MD They were accompanied today by their self. The patient has noticed a gradual decrease in her hearing in her right ear over the last year. Right ear can feel plugged on occasion. Reports her mother has age related hearing loss, more loss in the right ear than left ear. She denied otalgia, otorrhea, dizziness, tinnitus, loud noise exposure, and history of ear surgery.       OBJECTIVE:  Abuse Screening:  Do you feel unsafe at home or work/school? No  Do you feel threatened by someone? No  Does anyone try to keep you from having contact with others, or doing things outside of your home? No  Physical signs of abuse present? No     Fall Risk Screen:  1. Have you fallen two or more times in the past year? No  2. Have you fallen and had an injury in the past year? No    Timed Up and Go Score (in seconds): not tested  Is patient a fall risk? No  Referral initiated: No  Fall Risk Assessment Completed by Audiology    Otoscopic exam indicates partial obstruction with cerumen bilaterally     Pure Tone Thresholds assessed using conventional audiometry with good reliability from 250-8000 Hz bilaterally using insert earphones and circumaural headphones.     RIGHT: Normal hearing from 250-4000 Hz, sloping to moderate to moderately severe presumably sensorineural hearing loss     LEFT: Normal hearing from 250-4000 Hz, sloping to a mild to moderately severe presumably sensorineural hearing loss    Slight improvement in thresholds with inserts.     Tympanogram:    RIGHT: Normal eardrum mobility    LEFT:   Normal eardrum mobility    Reflexes (reported by stimulus ear):  Did not test due to equipment limitations.     Speech Reception Threshold:    RIGHT: 20 dB HL    LEFT:   25 dB HL    Word Recognition Score:     RIGHT: 100% at 60  dB HL using NU-6 recorded word list.    LEFT:   100% at 65 dB HL using NU-6 recorded word list.      ASSESSMENT: Today's results reveal normal hearing from 250-4000 Hz, sloping to a moderately severe presumably sensorineural hearing loss. Today s results were discussed with the patient in detail.     PLAN: It is recommended that Destinee repeat hearing evaluation in 1-2 years or sooner if new concerns arise. Patient was counseled regarding hearing loss and impact on communication. Please call this clinic with questions regarding these results or recommendations.    Obie Quevedo., CCC-A  [place] Licensed Audiologist #[id number]

## 2023-04-11 ASSESSMENT — ENCOUNTER SYMPTOMS
FREQUENCY: 0
FEVER: 0
ARTHRALGIAS: 0
CONSTIPATION: 0
HEMATURIA: 0
BREAST MASS: 0
JOINT SWELLING: 0
NAUSEA: 0
NERVOUS/ANXIOUS: 0
EYE PAIN: 0
DIARRHEA: 0
MYALGIAS: 0
ABDOMINAL PAIN: 0
HEMATOCHEZIA: 0
HEARTBURN: 0
DIZZINESS: 0
CHILLS: 0
WEAKNESS: 0
PALPITATIONS: 0
COUGH: 0
SHORTNESS OF BREATH: 0
HEADACHES: 0
PARESTHESIAS: 0
DYSURIA: 0
SORE THROAT: 0

## 2023-04-17 ENCOUNTER — OFFICE VISIT (OUTPATIENT)
Dept: INTERNAL MEDICINE | Facility: CLINIC | Age: 58
End: 2023-04-17
Payer: COMMERCIAL

## 2023-04-17 VITALS
TEMPERATURE: 97.8 F | OXYGEN SATURATION: 99 % | DIASTOLIC BLOOD PRESSURE: 74 MMHG | HEART RATE: 71 BPM | SYSTOLIC BLOOD PRESSURE: 124 MMHG | RESPIRATION RATE: 18 BRPM | HEIGHT: 65 IN | WEIGHT: 176 LBS | BODY MASS INDEX: 29.32 KG/M2

## 2023-04-17 DIAGNOSIS — G43.009 MIGRAINE WITHOUT AURA AND WITHOUT STATUS MIGRAINOSUS, NOT INTRACTABLE: ICD-10-CM

## 2023-04-17 DIAGNOSIS — Z80.3 FAMILY HISTORY OF MALIGNANT NEOPLASM OF BREAST: ICD-10-CM

## 2023-04-17 DIAGNOSIS — Z00.00 ROUTINE ADULT HEALTH MAINTENANCE: Primary | ICD-10-CM

## 2023-04-17 DIAGNOSIS — E78.5 HYPERLIPIDEMIA, UNSPECIFIED HYPERLIPIDEMIA TYPE: ICD-10-CM

## 2023-04-17 DIAGNOSIS — E53.8 VITAMIN B12 DEFICIENCY (NON ANEMIC): ICD-10-CM

## 2023-04-17 PROBLEM — M79.2 NEUROPATHIC PAIN: Status: RESOLVED | Noted: 2017-05-16 | Resolved: 2023-04-17

## 2023-04-17 PROBLEM — R20.9 SENSORY DISTURBANCE: Status: RESOLVED | Noted: 2017-02-14 | Resolved: 2023-04-17

## 2023-04-17 LAB
ALBUMIN SERPL BCG-MCNC: 4.3 G/DL (ref 3.5–5.2)
ALBUMIN UR-MCNC: NEGATIVE MG/DL
ALP SERPL-CCNC: 102 U/L (ref 35–104)
ALT SERPL W P-5'-P-CCNC: 14 U/L (ref 10–35)
ANION GAP SERPL CALCULATED.3IONS-SCNC: 14 MMOL/L (ref 7–15)
APPEARANCE UR: CLEAR
AST SERPL W P-5'-P-CCNC: 20 U/L (ref 10–35)
BILIRUB SERPL-MCNC: 0.4 MG/DL
BILIRUB UR QL STRIP: NEGATIVE
BUN SERPL-MCNC: 9.9 MG/DL (ref 8–23)
CALCIUM SERPL-MCNC: 9.7 MG/DL (ref 8.6–10)
CHLORIDE SERPL-SCNC: 103 MMOL/L (ref 98–107)
CHOLEST SERPL-MCNC: 269 MG/DL
COLOR UR AUTO: YELLOW
CREAT SERPL-MCNC: 0.89 MG/DL (ref 0.51–0.95)
DEPRECATED HCO3 PLAS-SCNC: 24 MMOL/L (ref 22–29)
ERYTHROCYTE [DISTWIDTH] IN BLOOD BY AUTOMATED COUNT: 12.8 % (ref 10–15)
GFR SERPL CREATININE-BSD FRML MDRD: 74 ML/MIN/1.73M2
GLUCOSE SERPL-MCNC: 90 MG/DL (ref 70–99)
GLUCOSE UR STRIP-MCNC: NEGATIVE MG/DL
HCT VFR BLD AUTO: 40.1 % (ref 35–47)
HDLC SERPL-MCNC: 59 MG/DL
HGB BLD-MCNC: 13.1 G/DL (ref 11.7–15.7)
HGB UR QL STRIP: NEGATIVE
KETONES UR STRIP-MCNC: NEGATIVE MG/DL
LDLC SERPL CALC-MCNC: 191 MG/DL
LEUKOCYTE ESTERASE UR QL STRIP: NEGATIVE
MCH RBC QN AUTO: 28.4 PG (ref 26.5–33)
MCHC RBC AUTO-ENTMCNC: 32.7 G/DL (ref 31.5–36.5)
MCV RBC AUTO: 87 FL (ref 78–100)
NITRATE UR QL: NEGATIVE
NONHDLC SERPL-MCNC: 210 MG/DL
PH UR STRIP: 6 [PH] (ref 5–8)
PLATELET # BLD AUTO: 357 10E3/UL (ref 150–450)
POTASSIUM SERPL-SCNC: 4.5 MMOL/L (ref 3.4–5.3)
PROT SERPL-MCNC: 7 G/DL (ref 6.4–8.3)
RBC # BLD AUTO: 4.61 10E6/UL (ref 3.8–5.2)
SODIUM SERPL-SCNC: 141 MMOL/L (ref 136–145)
SP GR UR STRIP: 1.02 (ref 1–1.03)
TRIGL SERPL-MCNC: 93 MG/DL
UROBILINOGEN UR STRIP-ACNC: 0.2 E.U./DL
VIT B12 SERPL-MCNC: 503 PG/ML (ref 232–1245)
WBC # BLD AUTO: 5.8 10E3/UL (ref 4–11)

## 2023-04-17 PROCEDURE — 80053 COMPREHEN METABOLIC PANEL: CPT | Performed by: INTERNAL MEDICINE

## 2023-04-17 PROCEDURE — 36415 COLL VENOUS BLD VENIPUNCTURE: CPT | Performed by: INTERNAL MEDICINE

## 2023-04-17 PROCEDURE — 82607 VITAMIN B-12: CPT | Performed by: INTERNAL MEDICINE

## 2023-04-17 PROCEDURE — 81003 URINALYSIS AUTO W/O SCOPE: CPT | Performed by: INTERNAL MEDICINE

## 2023-04-17 PROCEDURE — 85027 COMPLETE CBC AUTOMATED: CPT | Performed by: INTERNAL MEDICINE

## 2023-04-17 PROCEDURE — 80061 LIPID PANEL: CPT | Performed by: INTERNAL MEDICINE

## 2023-04-17 PROCEDURE — 99214 OFFICE O/P EST MOD 30 MIN: CPT | Mod: 25 | Performed by: INTERNAL MEDICINE

## 2023-04-17 PROCEDURE — 99396 PREV VISIT EST AGE 40-64: CPT | Performed by: INTERNAL MEDICINE

## 2023-04-17 ASSESSMENT — ENCOUNTER SYMPTOMS
DIARRHEA: 0
FEVER: 0
SHORTNESS OF BREATH: 0
EYE PAIN: 0
MYALGIAS: 0
HEMATOCHEZIA: 0
BREAST MASS: 0
ARTHRALGIAS: 0
SORE THROAT: 0
HEARTBURN: 0
JOINT SWELLING: 0
NERVOUS/ANXIOUS: 0
WEAKNESS: 0
DYSURIA: 0
HEADACHES: 0
CHILLS: 0
DIZZINESS: 0
CONSTIPATION: 0
PARESTHESIAS: 0
ABDOMINAL PAIN: 0
PALPITATIONS: 0
NAUSEA: 0
HEMATURIA: 0
COUGH: 0
FREQUENCY: 0

## 2023-04-17 ASSESSMENT — PAIN SCALES - GENERAL: PAINLEVEL: NO PAIN (0)

## 2023-04-17 NOTE — PROGRESS NOTES
SUBJECTIVE:   CC: Destinee is an 57 year old who presents for preventive health visit.       4/17/2023     8:01 AM   Additional Questions   Roomed by Myla       Patient has been advised of split billing requirements and indicates understanding: Yes  Healthy Habits:     Getting at least 3 servings of Calcium per day:  Yes    Bi-annual eye exam:  Yes    Dental care twice a year:  Yes    Sleep apnea or symptoms of sleep apnea:  None    Diet:  Low fat/cholesterol    Frequency of exercise:  2-3 days/week    Duration of exercise:  30-45 minutes    Taking medications regularly:  Not Applicable    Medication side effects:  Not applicable    PHQ-2 Total Score: 0    Additional concerns today:  No      Destinee comes in today for her annual physical.  She reports has been doing well.  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  Helps take care of her mother who has temporal arteritis and is weaning off of prednisone under the direction of the [facility].  Sister was diagnosed with carcinoma in situ of the breast.  Has done well with lumpectomy and will get XRT.  Patient herself tries to stay active.  Goes to the gym once or twice a week.  We will do more walking now that the weather is improving.  No other new somatic concerns.  She recently had her hearing checked.  They did not recommend amplification at this point.        Today's PHQ-2 Score:       4/11/2023     5:42 PM   PHQ-2 ( 1999 [employer])   Q1: Little interest or pleasure in doing things 0   Q2: Feeling down, depressed or hopeless 0   PHQ-2 Score 0   Q1: Little interest or pleasure in doing things Not at all    Not at all   Q2: Feeling down, depressed or hopeless Not at all    Not at all   PHQ-2 Score 0    0           Social History     Tobacco Use     Smoking status: Never     Smokeless tobacco: Never   Vaping Use     Vaping status: Not on file   Substance Use Topics     Alcohol use: No             4/11/2023     5:42 PM   Alcohol Use    Prescreen: >3 drinks/day or >7 drinks/week? Not Applicable     Reviewed orders with patient.  Reviewed health maintenance and updated orders accordingly - Yes      Breast Cancer Screening:        3/28/2022    10:22 AM   Breast CA Risk Assessment (FHS-7)   Do you have a family history of breast, colon, or ovarian cancer? No / Unknown         Mammogram Screening: Recommended mammography every 1-2 years with patient discussion and risk factor consideration  Pertinent mammograms are reviewed under the imaging tab.    History of abnormal Pap smear: Status post hysterectomy. Pap still indicated. See gyn notes.      Latest Ref Rng & Units 11/1/2022     3:07 PM 1/21/2015     9:03 AM   PAP / HPV   PAP  Negative for Intraepithelial Lesion or Malignancy (NILM)   Negative for squamous intraepithelial lesion or malignancy  Electronically signed by Shahriar Patel CT (ASCP) on 1/30/2015 at 11:54 AM       HPV 16 DNA Negative Negative      HPV 18 DNA Negative Negative      Other HR HPV Negative Negative        Reviewed and updated as needed this visit by clinical staff   Tobacco  Allergies  Meds              Reviewed and updated as needed this visit by Provider                     Review of Systems   Constitutional: Negative for chills and fever.   HENT: Positive for hearing loss. Negative for congestion, ear pain and sore throat.    Eyes: Negative for pain and visual disturbance.   Respiratory: Negative for cough and shortness of breath.    Cardiovascular: Negative for chest pain, palpitations and peripheral edema.   Gastrointestinal: Negative for abdominal pain, constipation, diarrhea, heartburn, hematochezia and nausea.   Breasts:  Negative for tenderness, breast mass and discharge.   Genitourinary: Negative for dysuria, frequency, genital sores, hematuria, pelvic pain, urgency, vaginal bleeding and vaginal discharge.   Musculoskeletal: Negative for arthralgias, joint swelling and myalgias.   Skin: Negative for rash.  "  Neurological: Negative for dizziness, weakness, headaches and paresthesias.   Psychiatric/Behavioral: Negative for mood changes. The patient is not nervous/anxious.           OBJECTIVE:   /74 (BP Location: Left arm, Patient Position: Sitting, Cuff Size: Adult Small)   Pulse 71   Temp 97.8  F (36.6  C)   Resp 18   Ht 1.651 m (5' 5\")   Wt 79.8 kg (176 lb)   SpO2 99%   BMI 29.29 kg/m    Physical Exam  GENERAL APPEARANCE: healthy, alert and no distress  EYES: Eyes grossly normal to inspection, PERRL and conjunctivae and sclerae normal  HENT: ear canals and TM's normal  NECK: no adenopathy, no asymmetry, masses, or scars and thyroid normal to palpation  RESP: lungs clear to auscultation - no rales, rhonchi or wheezes  CV: regular rate and rhythm, normal S1 S2, no S3 or S4, no murmur, click or rub, no peripheral edema and peripheral pulses strong  ABDOMEN: soft, nontender, no hepatosplenomegaly, no masses and bowel sounds normal  MS: no musculoskeletal defects are noted and gait is age appropriate without ataxia  SKIN: no suspicious lesions or rashes  NEURO: Normal strength and tone, sensory exam grossly normal, mentation intact and speech normal  PSYCH: mentation appears normal and affect normal/bright        ASSESSMENT/PLAN:   1. Routine adult health maintenance  Labs as below.  She is up-to-date on her immunizations and other health maintenance.  Continue healthy and active lifestyle.  - Lipid panel reflex to direct LDL Fasting; Future  - Comprehensive metabolic panel (BMP + Alb, Alk Phos, ALT, AST, Total. Bili, TP); Future  - CBC with platelets; Future  - Vitamin B12; Future  - UA Macro with Reflex to Micro and Culture - lab collect; Future  - CT Coronary Calcium Scan; Future    2. Hyperlipidemia, unspecified hyperlipidemia type  She is resistant to statins in the past.  She had a normal CT calcium score 10 years ago.  We will repeat that at this time.  - Lipid panel reflex to direct LDL Fasting; " "Future  - CT Coronary Calcium Scan; Future    3. Migraine without aura and without status migrainosus, not intractable  These come and go.  Currently stable.    4. Vitamin B12 deficiency (non anemic)    - CBC with platelets; Future  - Vitamin B12; Future    5. Family history of malignant neoplasm of breast  Sister with carcinoma in situ of the breast.  Doing well postlumpectomy.  No other family history known    Patient has been advised of split billing requirements and indicates understanding: Yes      COUNSELING:  Reviewed preventive health counseling, as reflected in patient instructions      BMI:   Estimated body mass index is 29.29 kg/m  as calculated from the following:    Height as of this encounter: 1.651 m (5' 5\").    Weight as of this encounter: 79.8 kg (176 lb).   Weight management plan: Discussed healthy diet and exercise guidelines      She reports that she has never smoked. She has never used smokeless tobacco.      YESSENIA BOYER MD  [facility]  "

## 2023-04-21 ENCOUNTER — HOSPITAL ENCOUNTER (OUTPATIENT)
Dept: CT IMAGING | Facility: CLINIC | Age: 58
Discharge: HOME | End: 2023-04-21
Attending: INTERNAL MEDICINE | Admitting: INTERNAL MEDICINE
Payer: COMMERCIAL

## 2023-04-21 DIAGNOSIS — Z00.00 ROUTINE ADULT HEALTH MAINTENANCE: ICD-10-CM

## 2023-04-21 DIAGNOSIS — E78.5 HYPERLIPIDEMIA, UNSPECIFIED HYPERLIPIDEMIA TYPE: ICD-10-CM

## 2023-04-21 LAB
CV CALCIUM SCORE AGATSTON LM: 0
CV CALCIUM SCORING AGATSON LAD: 31
CV CALCIUM SCORING AGATSTON CX: 0
CV CALCIUM SCORING AGATSTON RCA: 4
CV CALCIUM SCORING AGATSTON TOTAL: 35

## 2023-04-21 PROCEDURE — 75571 CT HRT W/O DYE W/CA TEST: CPT | Mod: 26 | Performed by: INTERNAL MEDICINE

## 2023-04-21 PROCEDURE — 75571 CT HRT W/O DYE W/CA TEST: CPT

## 2023-04-24 ENCOUNTER — MYC MEDICAL ADVICE (OUTPATIENT)
Dept: INTERNAL MEDICINE | Facility: CLINIC | Age: 58
End: 2023-04-24
Payer: COMMERCIAL

## 2023-04-24 DIAGNOSIS — I25.9 ASYMPTOMATIC CORONARY HEART DISEASE: Primary | ICD-10-CM

## 2023-04-25 RX ORDER — ROSUVASTATIN CALCIUM 20 MG/1
20 TABLET, COATED ORAL DAILY
Qty: 90 TABLET | Refills: 3 | Status: SHIPPED | OUTPATIENT
Start: 2023-04-25 | End: 2024-01-09

## 2023-04-25 NOTE — TELEPHONE ENCOUNTER
Note from CT Coronary Calcium Scan:      Matt Sung MD   4/24/2023  6:58 PM CDT       Not very good news Destinee.  I definitely think we should try a statin again.  Would you be willing now to take Crestor?  It is very well-tolerated.         Provider result note from 4/17/23 labs:     These look fine except for the cholesterol which is quite high.  I am glad you are getting that scan.   Written by Matt Sung MD on 4/19/2023  7:08 PM CDT  Seen by patient Destinee Odom on 4/19/2023  8:06 PM

## 2023-06-29 ENCOUNTER — MYC MEDICAL ADVICE (OUTPATIENT)
Dept: INTERNAL MEDICINE | Facility: CLINIC | Age: 58
End: 2023-06-29
Payer: COMMERCIAL

## 2023-10-17 ENCOUNTER — OFFICE VISIT (OUTPATIENT)
Dept: INTERNAL MEDICINE | Facility: CLINIC | Age: 58
End: 2023-10-17
Payer: COMMERCIAL

## 2023-10-17 VITALS
HEIGHT: 65 IN | OXYGEN SATURATION: 99 % | DIASTOLIC BLOOD PRESSURE: 77 MMHG | RESPIRATION RATE: 18 BRPM | TEMPERATURE: 97.7 F | HEART RATE: 77 BPM | WEIGHT: 169.2 LBS | BODY MASS INDEX: 28.19 KG/M2 | SYSTOLIC BLOOD PRESSURE: 119 MMHG

## 2023-10-17 DIAGNOSIS — E78.5 HYPERLIPIDEMIA, UNSPECIFIED HYPERLIPIDEMIA TYPE: Primary | ICD-10-CM

## 2023-10-17 DIAGNOSIS — Z80.0 FAMILY HISTORY OF COLON CANCER: ICD-10-CM

## 2023-10-17 DIAGNOSIS — Z80.3 FAMILY HISTORY OF MALIGNANT NEOPLASM OF BREAST: ICD-10-CM

## 2023-10-17 DIAGNOSIS — I25.9 ASYMPTOMATIC CORONARY HEART DISEASE: ICD-10-CM

## 2023-10-17 LAB
ALBUMIN SERPL BCG-MCNC: 4.2 G/DL (ref 3.5–5.2)
ALP SERPL-CCNC: 99 U/L (ref 35–104)
ALT SERPL W P-5'-P-CCNC: 16 U/L (ref 0–50)
ANION GAP SERPL CALCULATED.3IONS-SCNC: 10 MMOL/L (ref 7–15)
AST SERPL W P-5'-P-CCNC: 20 U/L (ref 0–45)
BILIRUB SERPL-MCNC: 0.5 MG/DL
BUN SERPL-MCNC: 11.5 MG/DL (ref 8–23)
CALCIUM SERPL-MCNC: 9.4 MG/DL (ref 8.8–10.2)
CHLORIDE SERPL-SCNC: 104 MMOL/L (ref 98–107)
CHOLEST SERPL-MCNC: 151 MG/DL
CREAT SERPL-MCNC: 0.82 MG/DL (ref 0.51–0.95)
DEPRECATED HCO3 PLAS-SCNC: 25 MMOL/L (ref 22–29)
EGFRCR SERPLBLD CKD-EPI 2021: 81 ML/MIN/1.73M2
GLUCOSE SERPL-MCNC: 95 MG/DL (ref 70–99)
HDLC SERPL-MCNC: 55 MG/DL
LDLC SERPL CALC-MCNC: 84 MG/DL
NONHDLC SERPL-MCNC: 96 MG/DL
POTASSIUM SERPL-SCNC: 4.2 MMOL/L (ref 3.4–5.3)
PROT SERPL-MCNC: 6.8 G/DL (ref 6.4–8.3)
SODIUM SERPL-SCNC: 139 MMOL/L (ref 135–145)
TRIGL SERPL-MCNC: 59 MG/DL

## 2023-10-17 PROCEDURE — 80053 COMPREHEN METABOLIC PANEL: CPT | Performed by: INTERNAL MEDICINE

## 2023-10-17 PROCEDURE — 90480 ADMN SARSCOV2 VAC 1/ONLY CMP: CPT | Performed by: INTERNAL MEDICINE

## 2023-10-17 PROCEDURE — 80061 LIPID PANEL: CPT | Performed by: INTERNAL MEDICINE

## 2023-10-17 PROCEDURE — 91320 SARSCV2 VAC 30MCG TRS-SUC IM: CPT | Performed by: INTERNAL MEDICINE

## 2023-10-17 PROCEDURE — 90682 RIV4 VACC RECOMBINANT DNA IM: CPT | Performed by: INTERNAL MEDICINE

## 2023-10-17 PROCEDURE — 36415 COLL VENOUS BLD VENIPUNCTURE: CPT | Performed by: INTERNAL MEDICINE

## 2023-10-17 PROCEDURE — 90471 IMMUNIZATION ADMIN: CPT | Performed by: INTERNAL MEDICINE

## 2023-10-17 PROCEDURE — 99214 OFFICE O/P EST MOD 30 MIN: CPT | Mod: 25 | Performed by: INTERNAL MEDICINE

## 2023-10-17 NOTE — PROGRESS NOTES
1. Hyperlipidemia, unspecified hyperlipidemia type  Lipids today for monitoring.  - Lipid panel reflex to direct LDL Fasting; Future  - Comprehensive metabolic panel (BMP + Alb, Alk Phos, ALT, AST, Total. Bili, TP); Future    2. Asymptomatic coronary heart disease  Now on rosuvastatin for primary prevention.  Continue same.  - Lipid panel reflex to direct LDL Fasting; Future    3. Family history of colon cancer--mother dxd at age 85  She is getting her regular colonoscopies and she should get her next one in 2025.    4. Family history of malignant neoplasm of breast  She will be getting her mammogram before she moves.    Flu and COVID boosters today.  Pneumococcal vaccine is coming up but she really is not at increased risk and I do not feel strongly she needs RSV vaccine at this time either.      Nathalie Felipe is a 58 year old, presenting for the following health issues:  Follow Up and Recheck Medication (Pt is here followmup )      10/17/2023     8:11 AM   Additional Questions   Roomed by lashon         10/17/2023     8:11 AM   Patient Reported Additional Medications   Patient reports taking the following new medications no       History of Present Illness       Reason for visit:  Annual physical    She eats 2-3 servings of fruits and vegetables daily.She consumes 2 sweetened beverage(s) daily.She exercises with enough effort to increase her heart rate 20 to 29 minutes per day.  She exercises with enough effort to increase her heart rate 3 or less days per week.   She is taking medications regularly.       Destinee comes in today for follow-up.  She has hyperlipidemia.  She is now on Crestor for primary prevention.  Does have known disease.  Tolerating without difficulty.  She notes that her mother was recently diagnosed with rectal cancer.  Currently undergoing chemo and radiation at the [facility].  Patient is moving to [facility] to be closer to her mother.  She is able to work for the  "[employer] as long as it is a neighboring [facility].  She will work remotely.  She is going to find a new provider in that area.  This will be our last visit.          Review of Systems         Objective    /77 (BP Location: Left arm, Patient Position: Sitting, Cuff Size: Adult Regular)   Pulse 77   Temp 97.7  F (36.5  C) (Oral)   Resp 18   Ht 1.648 m (5' 4.88\")   Wt 76.7 kg (169 lb 3.2 oz)   LMP  (LMP Unknown)   SpO2 99%   BMI 28.26 kg/m    Body mass index is 28.26 kg/m .  Physical Exam           Pleasant woman who looks well in no distress.  Vitals noted              "

## 2023-12-15 ENCOUNTER — ANCILLARY PROCEDURE (OUTPATIENT)
Dept: MAMMOGRAPHY | Facility: CLINIC | Age: 58
End: 2023-12-15
Attending: INTERNAL MEDICINE
Payer: COMMERCIAL

## 2023-12-15 DIAGNOSIS — Z12.31 VISIT FOR SCREENING MAMMOGRAM: ICD-10-CM

## 2023-12-15 PROCEDURE — 77067 SCR MAMMO BI INCL CAD: CPT

## 2024-01-09 ENCOUNTER — MYC MEDICAL ADVICE (OUTPATIENT)
Dept: INTERNAL MEDICINE | Facility: CLINIC | Age: 59
End: 2024-01-09
Payer: COMMERCIAL

## 2024-01-09 DIAGNOSIS — G43.009 MIGRAINE WITHOUT AURA AND WITHOUT STATUS MIGRAINOSUS, NOT INTRACTABLE: ICD-10-CM

## 2024-01-09 DIAGNOSIS — I25.9 ASYMPTOMATIC CORONARY HEART DISEASE: ICD-10-CM

## 2024-01-09 RX ORDER — SUMATRIPTAN 100 MG/1
TABLET, FILM COATED ORAL
Qty: 30 TABLET | Refills: 1 | Status: SHIPPED | OUTPATIENT
Start: 2024-01-09

## 2024-01-09 RX ORDER — ROSUVASTATIN CALCIUM 20 MG/1
20 TABLET, COATED ORAL DAILY
Qty: 90 TABLET | Refills: 1 | Status: SHIPPED | OUTPATIENT
Start: 2024-01-09 | End: 2024-07-08

## 2024-01-09 NOTE — TELEPHONE ENCOUNTER
Please review [employer] message on Rx's request and advise.     I have pended renewal for Crestor and Imitrex to sign if agreeable, thank you.

## 2024-03-18 ENCOUNTER — PATIENT OUTREACH (OUTPATIENT)
Dept: CARE COORDINATION | Facility: CLINIC | Age: 59
End: 2024-03-18
Payer: COMMERCIAL

## 2024-04-01 ENCOUNTER — PATIENT OUTREACH (OUTPATIENT)
Dept: CARE COORDINATION | Facility: CLINIC | Age: 59
End: 2024-04-01
Payer: COMMERCIAL

## 2024-07-08 DIAGNOSIS — I25.9 ASYMPTOMATIC CORONARY HEART DISEASE: ICD-10-CM

## 2024-07-08 RX ORDER — ROSUVASTATIN CALCIUM 20 MG/1
20 TABLET, COATED ORAL DAILY
Qty: 90 TABLET | Refills: 1 | Status: SHIPPED | OUTPATIENT
Start: 2024-07-08

## 2025-06-28 ENCOUNTER — HEALTH MAINTENANCE LETTER (OUTPATIENT)
Age: 60
End: 2025-06-28